# Patient Record
Sex: FEMALE | Race: WHITE | Employment: OTHER | URBAN - METROPOLITAN AREA
[De-identification: names, ages, dates, MRNs, and addresses within clinical notes are randomized per-mention and may not be internally consistent; named-entity substitution may affect disease eponyms.]

---

## 2017-01-03 ENCOUNTER — OFFICE VISIT (OUTPATIENT)
Dept: FAMILY MEDICINE CLINIC | Age: 67
End: 2017-01-03

## 2017-01-03 VITALS
OXYGEN SATURATION: 96 % | HEIGHT: 67 IN | DIASTOLIC BLOOD PRESSURE: 80 MMHG | WEIGHT: 166 LBS | SYSTOLIC BLOOD PRESSURE: 150 MMHG | BODY MASS INDEX: 26.06 KG/M2 | HEART RATE: 72 BPM

## 2017-01-03 DIAGNOSIS — M15.9 PRIMARY OSTEOARTHRITIS INVOLVING MULTIPLE JOINTS: ICD-10-CM

## 2017-01-03 DIAGNOSIS — E78.1 HIGH TRIGLYCERIDES: ICD-10-CM

## 2017-01-03 DIAGNOSIS — E11.9 TYPE 2 DIABETES MELLITUS WITHOUT COMPLICATION, WITHOUT LONG-TERM CURRENT USE OF INSULIN (HCC): ICD-10-CM

## 2017-01-03 DIAGNOSIS — I10 ESSENTIAL HYPERTENSION: Primary | ICD-10-CM

## 2017-01-03 DIAGNOSIS — K21.9 GASTROESOPHAGEAL REFLUX DISEASE WITHOUT ESOPHAGITIS: ICD-10-CM

## 2017-01-03 LAB
A/G RATIO: 1.6 (ref 1.1–2.2)
ALBUMIN SERPL-MCNC: 3.9 G/DL (ref 3.4–5)
ALP BLD-CCNC: 59 U/L (ref 40–129)
ALT SERPL-CCNC: 15 U/L (ref 10–40)
ANION GAP SERPL CALCULATED.3IONS-SCNC: 16 MMOL/L (ref 3–16)
AST SERPL-CCNC: 15 U/L (ref 15–37)
BASOPHILS ABSOLUTE: 0.1 K/UL (ref 0–0.2)
BASOPHILS RELATIVE PERCENT: 0.5 %
BILIRUB SERPL-MCNC: 0.4 MG/DL (ref 0–1)
BUN BLDV-MCNC: 17 MG/DL (ref 7–20)
CALCIUM SERPL-MCNC: 9.3 MG/DL (ref 8.3–10.6)
CHLORIDE BLD-SCNC: 95 MMOL/L (ref 99–110)
CHOLESTEROL, TOTAL: 168 MG/DL (ref 0–199)
CO2: 25 MMOL/L (ref 21–32)
CREAT SERPL-MCNC: <0.5 MG/DL (ref 0.6–1.2)
EOSINOPHILS ABSOLUTE: 0.3 K/UL (ref 0–0.6)
EOSINOPHILS RELATIVE PERCENT: 2.6 %
GFR AFRICAN AMERICAN: >60
GFR NON-AFRICAN AMERICAN: >60
GLOBULIN: 2.5 G/DL
GLUCOSE BLD-MCNC: 143 MG/DL (ref 70–99)
HCT VFR BLD CALC: 42.8 % (ref 36–48)
HDLC SERPL-MCNC: 33 MG/DL (ref 40–60)
HEMOGLOBIN: 14.8 G/DL (ref 12–16)
LDL CHOLESTEROL CALCULATED: 84 MG/DL
LYMPHOCYTES ABSOLUTE: 3.1 K/UL (ref 1–5.1)
LYMPHOCYTES RELATIVE PERCENT: 27.7 %
MCH RBC QN AUTO: 30.9 PG (ref 26–34)
MCHC RBC AUTO-ENTMCNC: 34.6 G/DL (ref 31–36)
MCV RBC AUTO: 89.4 FL (ref 80–100)
MONOCYTES ABSOLUTE: 0.5 K/UL (ref 0–1.3)
MONOCYTES RELATIVE PERCENT: 4.6 %
NEUTROPHILS ABSOLUTE: 7.3 K/UL (ref 1.7–7.7)
NEUTROPHILS RELATIVE PERCENT: 64.6 %
PDW BLD-RTO: 13.5 % (ref 12.4–15.4)
PLATELET # BLD: 165 K/UL (ref 135–450)
PMV BLD AUTO: 10.1 FL (ref 5–10.5)
POTASSIUM SERPL-SCNC: 3.8 MMOL/L (ref 3.5–5.1)
RBC # BLD: 4.79 M/UL (ref 4–5.2)
SODIUM BLD-SCNC: 136 MMOL/L (ref 136–145)
TOTAL PROTEIN: 6.4 G/DL (ref 6.4–8.2)
TRIGL SERPL-MCNC: 256 MG/DL (ref 0–150)
TSH SERPL DL<=0.05 MIU/L-ACNC: 3.18 UIU/ML (ref 0.27–4.2)
VLDLC SERPL CALC-MCNC: 51 MG/DL
WBC # BLD: 11.3 K/UL (ref 4–11)

## 2017-01-03 PROCEDURE — 99214 OFFICE O/P EST MOD 30 MIN: CPT | Performed by: NURSE PRACTITIONER

## 2017-01-03 PROCEDURE — 36415 COLL VENOUS BLD VENIPUNCTURE: CPT | Performed by: NURSE PRACTITIONER

## 2017-01-03 RX ORDER — DILTIAZEM HYDROCHLORIDE 60 MG/1
TABLET, FILM COATED ORAL
Qty: 120 TABLET | Refills: 2 | Status: ON HOLD | OUTPATIENT
Start: 2017-01-03 | End: 2017-01-31 | Stop reason: HOSPADM

## 2017-01-03 RX ORDER — LORAZEPAM 0.5 MG/1
0.5 TABLET ORAL DAILY
Qty: 30 TABLET | Refills: 2 | Status: CANCELLED | OUTPATIENT
Start: 2017-01-03

## 2017-01-03 RX ORDER — LORAZEPAM 0.5 MG/1
0.5 TABLET ORAL NIGHTLY
Qty: 30 TABLET | Refills: 2 | Status: SHIPPED | OUTPATIENT
Start: 2017-01-03

## 2017-01-03 RX ORDER — LISINOPRIL AND HYDROCHLOROTHIAZIDE 25; 20 MG/1; MG/1
1 TABLET ORAL 2 TIMES DAILY
Qty: 60 TABLET | Refills: 2 | Status: SHIPPED | OUTPATIENT
Start: 2017-01-03 | End: 2018-09-10 | Stop reason: SDUPTHER

## 2017-01-03 RX ORDER — GABAPENTIN 300 MG/1
300 CAPSULE ORAL NIGHTLY
Qty: 30 CAPSULE | Refills: 2 | Status: CANCELLED | OUTPATIENT
Start: 2017-01-03

## 2017-01-03 RX ORDER — OMEPRAZOLE 20 MG/1
20 TABLET, DELAYED RELEASE ORAL DAILY
Qty: 30 TABLET | Refills: 2 | Status: SHIPPED | OUTPATIENT
Start: 2017-01-03 | End: 2020-08-28

## 2017-01-03 RX ORDER — ATENOLOL 50 MG/1
TABLET ORAL
Qty: 90 TABLET | Refills: 2 | Status: ON HOLD | OUTPATIENT
Start: 2017-01-03 | End: 2017-01-31 | Stop reason: HOSPADM

## 2017-01-03 ASSESSMENT — ENCOUNTER SYMPTOMS
SINUS PRESSURE: 0
TROUBLE SWALLOWING: 0
SHORTNESS OF BREATH: 0
BACK PAIN: 0
PHOTOPHOBIA: 0
SORE THROAT: 0
WHEEZING: 0
COUGH: 0

## 2017-01-04 LAB
ESTIMATED AVERAGE GLUCOSE: 134.1 MG/DL
HBA1C MFR BLD: 6.3 %

## 2017-01-16 ENCOUNTER — TELEPHONE (OUTPATIENT)
Dept: FAMILY MEDICINE CLINIC | Age: 67
End: 2017-01-16

## 2017-01-27 PROBLEM — I21.4 NSTEMI (NON-ST ELEVATED MYOCARDIAL INFARCTION) (HCC): Status: ACTIVE | Noted: 2017-01-27

## 2017-01-28 PROBLEM — I48.0 PAROXYSMAL ATRIAL FIBRILLATION (HCC): Status: ACTIVE | Noted: 2017-01-28

## 2017-02-09 ENCOUNTER — OFFICE VISIT (OUTPATIENT)
Dept: CARDIOLOGY CLINIC | Age: 67
End: 2017-02-09

## 2017-02-09 VITALS
HEIGHT: 67 IN | OXYGEN SATURATION: 98 % | SYSTOLIC BLOOD PRESSURE: 130 MMHG | BODY MASS INDEX: 25.74 KG/M2 | DIASTOLIC BLOOD PRESSURE: 80 MMHG | WEIGHT: 164 LBS | HEART RATE: 72 BPM

## 2017-02-09 DIAGNOSIS — I48.0 PAROXYSMAL ATRIAL FIBRILLATION (HCC): ICD-10-CM

## 2017-02-09 DIAGNOSIS — I21.4 NSTEMI (NON-ST ELEVATED MYOCARDIAL INFARCTION) (HCC): ICD-10-CM

## 2017-02-09 DIAGNOSIS — I10 ESSENTIAL HYPERTENSION: ICD-10-CM

## 2017-02-09 DIAGNOSIS — I25.10 CORONARY ARTERY DISEASE INVOLVING NATIVE CORONARY ARTERY OF NATIVE HEART WITHOUT ANGINA PECTORIS: Primary | ICD-10-CM

## 2017-02-09 DIAGNOSIS — Z79.899 LONG TERM CURRENT USE OF AMIODARONE: ICD-10-CM

## 2017-02-09 DIAGNOSIS — E78.2 MIXED HYPERLIPIDEMIA: ICD-10-CM

## 2017-02-09 DIAGNOSIS — F17.200 TOBACCO USE DISORDER: ICD-10-CM

## 2017-02-09 DIAGNOSIS — E11.9 TYPE 2 DIABETES MELLITUS WITHOUT COMPLICATION, WITHOUT LONG-TERM CURRENT USE OF INSULIN (HCC): ICD-10-CM

## 2017-02-09 PROCEDURE — 4004F PT TOBACCO SCREEN RCVD TLK: CPT | Performed by: INTERNAL MEDICINE

## 2017-02-09 PROCEDURE — G8400 PT W/DXA NO RESULTS DOC: HCPCS | Performed by: INTERNAL MEDICINE

## 2017-02-09 PROCEDURE — 3044F HG A1C LEVEL LT 7.0%: CPT | Performed by: INTERNAL MEDICINE

## 2017-02-09 PROCEDURE — 1111F DSCHRG MED/CURRENT MED MERGE: CPT | Performed by: INTERNAL MEDICINE

## 2017-02-09 PROCEDURE — 3017F COLORECTAL CA SCREEN DOC REV: CPT | Performed by: INTERNAL MEDICINE

## 2017-02-09 PROCEDURE — 4040F PNEUMOC VAC/ADMIN/RCVD: CPT | Performed by: INTERNAL MEDICINE

## 2017-02-09 PROCEDURE — G8427 DOCREV CUR MEDS BY ELIG CLIN: HCPCS | Performed by: INTERNAL MEDICINE

## 2017-02-09 PROCEDURE — G8598 ASA/ANTIPLAT THER USED: HCPCS | Performed by: INTERNAL MEDICINE

## 2017-02-09 PROCEDURE — 1123F ACP DISCUSS/DSCN MKR DOCD: CPT | Performed by: INTERNAL MEDICINE

## 2017-02-09 PROCEDURE — G8420 CALC BMI NORM PARAMETERS: HCPCS | Performed by: INTERNAL MEDICINE

## 2017-02-09 PROCEDURE — 4000F TOBACCO USE TXMNT COUNSELING: CPT | Performed by: INTERNAL MEDICINE

## 2017-02-09 PROCEDURE — G8484 FLU IMMUNIZE NO ADMIN: HCPCS | Performed by: INTERNAL MEDICINE

## 2017-02-09 PROCEDURE — 3014F SCREEN MAMMO DOC REV: CPT | Performed by: INTERNAL MEDICINE

## 2017-02-09 PROCEDURE — 1090F PRES/ABSN URINE INCON ASSESS: CPT | Performed by: INTERNAL MEDICINE

## 2017-02-09 PROCEDURE — 99215 OFFICE O/P EST HI 40 MIN: CPT | Performed by: INTERNAL MEDICINE

## 2017-02-09 RX ORDER — AMIODARONE HYDROCHLORIDE 200 MG/1
200 TABLET ORAL DAILY
Qty: 60 TABLET | Refills: 5 | Status: SHIPPED | OUTPATIENT
Start: 2017-02-09 | End: 2017-10-17 | Stop reason: DRUGHIGH

## 2017-02-09 RX ORDER — NICOTINE 21 MG/24HR
1 PATCH, TRANSDERMAL 24 HOURS TRANSDERMAL EVERY 24 HOURS
Qty: 14 PATCH | Refills: 0 | Status: SHIPPED | OUTPATIENT
Start: 2017-02-09 | End: 2017-10-17

## 2017-02-09 RX ORDER — ATORVASTATIN CALCIUM 40 MG/1
40 TABLET, FILM COATED ORAL DAILY
Qty: 30 TABLET | Refills: 6 | Status: SHIPPED | OUTPATIENT
Start: 2017-02-09 | End: 2018-09-10 | Stop reason: SDUPTHER

## 2017-02-09 ASSESSMENT — ENCOUNTER SYMPTOMS
BLOOD IN STOOL: 0
WHEEZING: 0
COLOR CHANGE: 0
EYE REDNESS: 0
EYE PAIN: 0
CHEST TIGHTNESS: 0
ABDOMINAL PAIN: 0
COUGH: 0
SHORTNESS OF BREATH: 0

## 2017-02-27 RX ORDER — WARFARIN SODIUM 5 MG/1
5 TABLET ORAL DAILY
Qty: 30 TABLET | Refills: 3 | Status: SHIPPED | OUTPATIENT
Start: 2017-02-27 | End: 2018-09-12 | Stop reason: SDUPTHER

## 2017-05-15 ENCOUNTER — OFFICE VISIT (OUTPATIENT)
Dept: CARDIOLOGY CLINIC | Age: 67
End: 2017-05-15

## 2017-05-15 VITALS
WEIGHT: 173 LBS | DIASTOLIC BLOOD PRESSURE: 80 MMHG | BODY MASS INDEX: 27.8 KG/M2 | OXYGEN SATURATION: 97 % | HEART RATE: 88 BPM | HEIGHT: 66 IN | SYSTOLIC BLOOD PRESSURE: 142 MMHG

## 2017-05-15 DIAGNOSIS — Z79.899 LONG TERM CURRENT USE OF AMIODARONE: ICD-10-CM

## 2017-05-15 DIAGNOSIS — I10 ESSENTIAL HYPERTENSION: ICD-10-CM

## 2017-05-15 DIAGNOSIS — E78.2 MIXED HYPERLIPIDEMIA: ICD-10-CM

## 2017-05-15 DIAGNOSIS — I25.10 CORONARY ARTERY DISEASE INVOLVING NATIVE CORONARY ARTERY OF NATIVE HEART WITHOUT ANGINA PECTORIS: ICD-10-CM

## 2017-05-15 DIAGNOSIS — I48.0 PAROXYSMAL ATRIAL FIBRILLATION (HCC): Primary | ICD-10-CM

## 2017-05-15 PROCEDURE — 3014F SCREEN MAMMO DOC REV: CPT | Performed by: INTERNAL MEDICINE

## 2017-05-15 PROCEDURE — G8598 ASA/ANTIPLAT THER USED: HCPCS | Performed by: INTERNAL MEDICINE

## 2017-05-15 PROCEDURE — 4040F PNEUMOC VAC/ADMIN/RCVD: CPT | Performed by: INTERNAL MEDICINE

## 2017-05-15 PROCEDURE — 4004F PT TOBACCO SCREEN RCVD TLK: CPT | Performed by: INTERNAL MEDICINE

## 2017-05-15 PROCEDURE — G8420 CALC BMI NORM PARAMETERS: HCPCS | Performed by: INTERNAL MEDICINE

## 2017-05-15 PROCEDURE — G8427 DOCREV CUR MEDS BY ELIG CLIN: HCPCS | Performed by: INTERNAL MEDICINE

## 2017-05-15 PROCEDURE — 1123F ACP DISCUSS/DSCN MKR DOCD: CPT | Performed by: INTERNAL MEDICINE

## 2017-05-15 PROCEDURE — 1090F PRES/ABSN URINE INCON ASSESS: CPT | Performed by: INTERNAL MEDICINE

## 2017-05-15 PROCEDURE — 99214 OFFICE O/P EST MOD 30 MIN: CPT | Performed by: INTERNAL MEDICINE

## 2017-05-15 PROCEDURE — 3017F COLORECTAL CA SCREEN DOC REV: CPT | Performed by: INTERNAL MEDICINE

## 2017-05-15 PROCEDURE — G8400 PT W/DXA NO RESULTS DOC: HCPCS | Performed by: INTERNAL MEDICINE

## 2017-05-15 RX ORDER — MAGNESIUM OXIDE 400 MG/1
400 TABLET ORAL 2 TIMES DAILY
Qty: 60 TABLET | Refills: 6 | Status: SHIPPED | OUTPATIENT
Start: 2017-05-15 | End: 2017-12-08 | Stop reason: SDUPTHER

## 2017-05-15 RX ORDER — METOPROLOL TARTRATE 50 MG/1
50 TABLET, FILM COATED ORAL 2 TIMES DAILY
Qty: 60 TABLET | Refills: 6 | Status: SHIPPED | OUTPATIENT
Start: 2017-05-15 | End: 2017-12-08 | Stop reason: SDUPTHER

## 2017-05-15 ASSESSMENT — ENCOUNTER SYMPTOMS
CHEST TIGHTNESS: 0
ABDOMINAL PAIN: 0
COUGH: 0
COLOR CHANGE: 0
WHEEZING: 0
EYE REDNESS: 0
SHORTNESS OF BREATH: 0
BLOOD IN STOOL: 0
EYE PAIN: 0

## 2017-10-17 ENCOUNTER — OFFICE VISIT (OUTPATIENT)
Dept: CARDIOLOGY CLINIC | Age: 67
End: 2017-10-17

## 2017-10-17 VITALS
SYSTOLIC BLOOD PRESSURE: 180 MMHG | OXYGEN SATURATION: 96 % | HEART RATE: 80 BPM | BODY MASS INDEX: 30.51 KG/M2 | WEIGHT: 189 LBS | DIASTOLIC BLOOD PRESSURE: 90 MMHG

## 2017-10-17 DIAGNOSIS — R07.9 CHEST PAIN, UNSPECIFIED TYPE: ICD-10-CM

## 2017-10-17 DIAGNOSIS — I25.10 CORONARY ARTERY DISEASE INVOLVING NATIVE CORONARY ARTERY OF NATIVE HEART WITHOUT ANGINA PECTORIS: ICD-10-CM

## 2017-10-17 DIAGNOSIS — I10 ESSENTIAL HYPERTENSION: ICD-10-CM

## 2017-10-17 DIAGNOSIS — I48.0 PAROXYSMAL ATRIAL FIBRILLATION (HCC): Primary | ICD-10-CM

## 2017-10-17 DIAGNOSIS — E78.2 MIXED HYPERLIPIDEMIA: ICD-10-CM

## 2017-10-17 DIAGNOSIS — Z79.899 LONG TERM CURRENT USE OF AMIODARONE: ICD-10-CM

## 2017-10-17 DIAGNOSIS — E83.42 HYPOMAGNESEMIA: ICD-10-CM

## 2017-10-17 PROCEDURE — G8484 FLU IMMUNIZE NO ADMIN: HCPCS | Performed by: INTERNAL MEDICINE

## 2017-10-17 PROCEDURE — 3014F SCREEN MAMMO DOC REV: CPT | Performed by: INTERNAL MEDICINE

## 2017-10-17 PROCEDURE — 1090F PRES/ABSN URINE INCON ASSESS: CPT | Performed by: INTERNAL MEDICINE

## 2017-10-17 PROCEDURE — 4040F PNEUMOC VAC/ADMIN/RCVD: CPT | Performed by: INTERNAL MEDICINE

## 2017-10-17 PROCEDURE — G8598 ASA/ANTIPLAT THER USED: HCPCS | Performed by: INTERNAL MEDICINE

## 2017-10-17 PROCEDURE — G8417 CALC BMI ABV UP PARAM F/U: HCPCS | Performed by: INTERNAL MEDICINE

## 2017-10-17 PROCEDURE — 99214 OFFICE O/P EST MOD 30 MIN: CPT | Performed by: INTERNAL MEDICINE

## 2017-10-17 PROCEDURE — G8400 PT W/DXA NO RESULTS DOC: HCPCS | Performed by: INTERNAL MEDICINE

## 2017-10-17 PROCEDURE — G8427 DOCREV CUR MEDS BY ELIG CLIN: HCPCS | Performed by: INTERNAL MEDICINE

## 2017-10-17 PROCEDURE — 93000 ELECTROCARDIOGRAM COMPLETE: CPT | Performed by: INTERNAL MEDICINE

## 2017-10-17 PROCEDURE — 1123F ACP DISCUSS/DSCN MKR DOCD: CPT | Performed by: INTERNAL MEDICINE

## 2017-10-17 PROCEDURE — 4004F PT TOBACCO SCREEN RCVD TLK: CPT | Performed by: INTERNAL MEDICINE

## 2017-10-17 PROCEDURE — 3017F COLORECTAL CA SCREEN DOC REV: CPT | Performed by: INTERNAL MEDICINE

## 2017-10-17 RX ORDER — AMLODIPINE BESYLATE 10 MG/1
10 TABLET ORAL DAILY
Qty: 30 TABLET | Refills: 6 | Status: SHIPPED | OUTPATIENT
Start: 2017-10-17 | End: 2017-10-30 | Stop reason: CLARIF

## 2017-10-17 RX ORDER — AMIODARONE HYDROCHLORIDE 200 MG/1
100 TABLET ORAL DAILY
Qty: 30 TABLET | Refills: 6 | Status: SHIPPED
Start: 2017-10-17 | End: 2018-04-24 | Stop reason: SDUPTHER

## 2017-10-17 ASSESSMENT — ENCOUNTER SYMPTOMS
ABDOMINAL PAIN: 0
SHORTNESS OF BREATH: 0
CHEST TIGHTNESS: 0
EYE REDNESS: 0
EYE PAIN: 0
COLOR CHANGE: 0
BLOOD IN STOOL: 0
WHEEZING: 0
COUGH: 0

## 2017-10-17 NOTE — PATIENT INSTRUCTIONS
Patient Education        High Blood Pressure: Care Instructions  Your Care Instructions  If your blood pressure is usually above 140/90, you have high blood pressure, or hypertension. That means the top number is 140 or higher or the bottom number is 90 or higher, or both. Despite what a lot of people think, high blood pressure usually doesn't cause headaches or make you feel dizzy or lightheaded. It usually has no symptoms. But it does increase your risk for heart attack, stroke, and kidney or eye damage. The higher your blood pressure, the more your risk increases. Your doctor will give you a goal for your blood pressure. Your goal will be based on your health and your age. An example of a goal is to keep your blood pressure below 140/90. Lifestyle changes, such as eating healthy and being active, are always important to help lower blood pressure. You might also take medicine to reach your blood pressure goal.  Follow-up care is a key part of your treatment and safety. Be sure to make and go to all appointments, and call your doctor if you are having problems. It's also a good idea to know your test results and keep a list of the medicines you take. How can you care for yourself at home? Medical treatment  · If you stop taking your medicine, your blood pressure will go back up. You may take one or more types of medicine to lower your blood pressure. Be safe with medicines. Take your medicine exactly as prescribed. Call your doctor if you think you are having a problem with your medicine. · Talk to your doctor before you start taking aspirin every day. Aspirin can help certain people lower their risk of a heart attack or stroke. But taking aspirin isn't right for everyone, because it can cause serious bleeding. · See your doctor regularly. You may need to see the doctor more often at first or until your blood pressure comes down.   · If you are taking blood pressure medicine, talk to your doctor before you take decongestants or anti-inflammatory medicine, such as ibuprofen. Some of these medicines can raise blood pressure. · Learn how to check your blood pressure at home. Lifestyle changes  · Stay at a healthy weight. This is especially important if you put on weight around the waist. Losing even 10 pounds can help you lower your blood pressure. · If your doctor recommends it, get more exercise. Walking is a good choice. Bit by bit, increase the amount you walk every day. Try for at least 30 minutes on most days of the week. You also may want to swim, bike, or do other activities. · Avoid or limit alcohol. Talk to your doctor about whether you can drink any alcohol. · Try to limit how much sodium you eat to less than 2,300 milligrams (mg) a day. Your doctor may ask you to try to eat less than 1,500 mg a day. · Eat plenty of fruits (such as bananas and oranges), vegetables, legumes, whole grains, and low-fat dairy products. · Lower the amount of saturated fat in your diet. Saturated fat is found in animal products such as milk, cheese, and meat. Limiting these foods may help you lose weight and also lower your risk for heart disease. · Do not smoke. Smoking increases your risk for heart attack and stroke. If you need help quitting, talk to your doctor about stop-smoking programs and medicines. These can increase your chances of quitting for good. When should you call for help? Call 911 anytime you think you may need emergency care. This may mean having symptoms that suggest that your blood pressure is causing a serious heart or blood vessel problem. Your blood pressure may be over 180/110. For example, call 911 if:  · You have symptoms of a heart attack. These may include:  ¨ Chest pain or pressure, or a strange feeling in the chest.  ¨ Sweating. ¨ Shortness of breath. ¨ Nausea or vomiting.   ¨ Pain, pressure, or a strange feeling in the back, neck, jaw, or upper belly or in one or both shoulders or

## 2017-10-17 NOTE — LETTER
[address] Cardiology - [address] [facility] [address]  [address]   [address] [address]  Phone: [phone]  Fax: [phone]    Emely Lomeli MD        November 6, 2017     [facility]  No address on file    Patient: Xiomy Ruiz  MR Number: [id number]  YOB: 1950  Date of Visit: 10/17/2017    Dear Dr. Yandy Ledesma:    [facility] Xiomy Ruiz reports several episodes of chest pain the past few months. She states that pain is mild and described as a tightness in her mid-sternal area. Her pain occurs at rest. She was seen in the ED 10/10/17 for UTI. Today she denies exertional palpitations, dizziness, syncope, leg swelling and increased dyspnea. Assessment:       Atrial fibrillation ([address] Taking amiodarone and Eliquis. Mg 1.2, K normal TSH normal. Taking Mg supplements.  CAD (coronary artery disease)     NSTEMI 1/201-->Echo LVEF normal, grade II diastolic dysfx, LAE. Cath 1/27/17 70-75% LAD, FFR LAD 0.80, 50% PLD- Med tx.  GERD (gastroesophageal reflux disease)    Dyslipidemia. LDL 42. Taking statin.  DM, managed by PCP.  Hypertension-uncontrolled.  Tobacco use disorder. Cessation discussed. Chest pain. Atypical. EKG 10/2017 NSR, T wave inversions V3-V6 consider ischemia- unchanged. .       Plan:      Atypical chest pain, no angina. Currently in regular rhythm. Will reduce amiodarone to 100mg daily. In view of HTN, will stop diltiazem and start norvasc 10mg daily. Continue to monitor BP. Fasting lipid profile, CMP, TSH with reflex, Mg level prior to next visit. If you have questions, please do not hesitate to call me. I look forward to following Jennifer Catherine along with you.     Sincerely,        Emely Lomeli MD

## 2017-10-17 NOTE — PROGRESS NOTES
Subjective:      Patient ID: Rojas Estrada is a 75 y.o. female. Reason for visit: f/u CAD  CC: \"I had chest pain. \"    HPI Rojas Estrada reports several episodes of chest pain the past few months. She states that pain is mild and described as a tightness in her mid-sternal area. Her pain occurs at rest. She was seen in the ED 10/10/17 for UTI. Today she denies exertional palpitations, dizziness, syncope, leg swelling and increased dyspnea. Review of Systems   Constitutional: Negative for activity change, appetite change, diaphoresis and fatigue. HENT: Negative for nosebleeds and tinnitus. Eyes: Negative for pain and redness. Respiratory: Negative for cough, chest tightness, shortness of breath and wheezing. Cardiovascular: Positive for chest pain. Negative for palpitations and leg swelling. Gastrointestinal: Negative for abdominal pain and blood in stool. Genitourinary: Negative for difficulty urinating, hematuria, pelvic pain and vaginal bleeding. Musculoskeletal: Negative for joint swelling, myalgias and neck pain. Skin: Negative for color change and pallor. Neurological: Negative for dizziness, syncope, numbness and headaches. Hematological: Does not bruise/bleed easily. Psychiatric/Behavioral: Negative for confusion and decreased concentration. All other systems reviewed and are negative. Vitals:    10/17/17 1506 10/17/17 1509   BP: (!) 200/90 (!) 180/90   Site: Left Arm Left Arm   Position: Sitting Sitting   Cuff Size: Medium Adult Medium Adult   Pulse: 80    SpO2: 96%    Weight: 189 lb (85.7 kg)      Body mass index is 30.51 kg/m².      Wt Readings from Last 3 Encounters:   10/17/17 189 lb (85.7 kg)   10/10/17 185 lb (83.9 kg)   05/15/17 173 lb (78.5 kg)     BP Readings from Last 3 Encounters:   10/17/17 (!) 180/90   10/10/17 (!) 193/99   05/15/17 (!) 142/80      Current Outpatient Prescriptions on File Prior to Visit   Medication Sig Dispense Refill    cefUROXime sounds are normal.   Musculoskeletal: Normal range of motion. She exhibits no edema. Neurological: She is alert and oriented to person, place, and time. Skin: Skin is warm and dry. Psychiatric: She has a normal mood and affect. Her behavior is normal.   Nursing note and vitals reviewed. Recent labs and imaging reviewed. Assessment:       Atrial fibrillation ([address] Taking amiodarone and Eliquis. Mg 1.2, K normal TSH normal. Taking Mg supplements.  CAD (coronary artery disease)     NSTEMI 1/201-->Echo LVEF normal, grade II diastolic dysfx, LAE. Cath 1/27/17 70-75% LAD, FFR LAD 0.80, 50% PLD- Med tx.  GERD (gastroesophageal reflux disease)    Dyslipidemia. LDL 42. Taking statin.  DM, managed by PCP.  Hypertension-uncontrolled.  Tobacco use disorder. Cessation discussed. Chest pain. Atypical. EKG 10/2017 NSR, T wave inversions V3-V6 consider ischemia- unchanged. .       Plan:      Atypical chest pain, no angina. Currently in regular rhythm. Will reduce amiodarone to 100mg daily. In view of HTN, will stop diltiazem and start norvasc 10mg daily. Continue to monitor BP. Fasting lipid profile, CMP, TSH with reflex, Mg level prior to next visit.

## 2017-10-30 ENCOUNTER — TELEPHONE (OUTPATIENT)
Dept: CARDIOLOGY CLINIC | Age: 67
End: 2017-10-30

## 2017-10-30 RX ORDER — DILTIAZEM HYDROCHLORIDE 240 MG/1
240 CAPSULE, EXTENDED RELEASE ORAL DAILY
COMMUNITY
End: 2017-10-30 | Stop reason: SDUPTHER

## 2017-10-30 NOTE — TELEPHONE ENCOUNTER
Please advise pt to add clonidine 0.1mg po bid for high BP. Continue other medications at this time.

## 2017-10-31 RX ORDER — DILTIAZEM HYDROCHLORIDE 240 MG/1
240 CAPSULE, EXTENDED RELEASE ORAL DAILY
Qty: 30 CAPSULE | Refills: 5 | Status: SHIPPED | OUTPATIENT
Start: 2017-10-31 | End: 2018-09-04 | Stop reason: ALTCHOICE

## 2017-11-06 NOTE — COMMUNICATION BODY
HPI Lilia Win reports several episodes of chest pain the past few months. She states that pain is mild and described as a tightness in her mid-sternal area. Her pain occurs at rest. She was seen in the ED 10/10/17 for UTI. Today she denies exertional palpitations, dizziness, syncope, leg swelling and increased dyspnea. Assessment:       Atrial fibrillation ([address] Taking amiodarone and Eliquis. Mg 1.2, K normal TSH normal. Taking Mg supplements.  CAD (coronary artery disease)     NSTEMI 1/201-->Echo LVEF normal, grade II diastolic dysfx, LAE. Cath 1/27/17 70-75% LAD, FFR LAD 0.80, 50% PLD- Med tx.  GERD (gastroesophageal reflux disease)    Dyslipidemia. LDL 42. Taking statin.  DM, managed by PCP.  Hypertension-uncontrolled.  Tobacco use disorder. Cessation discussed. Chest pain. Atypical. EKG 10/2017 NSR, T wave inversions V3-V6 consider ischemia- unchanged. .       Plan:      Atypical chest pain, no angina. Currently in regular rhythm. Will reduce amiodarone to 100mg daily. In view of HTN, will stop diltiazem and start norvasc 10mg daily. Continue to monitor BP. Fasting lipid profile, CMP, TSH with reflex, Mg level prior to next visit.

## 2017-12-08 RX ORDER — METOPROLOL TARTRATE 50 MG/1
50 TABLET, FILM COATED ORAL 2 TIMES DAILY
Qty: 60 TABLET | Refills: 6 | Status: ON HOLD | OUTPATIENT
Start: 2017-12-08 | End: 2018-09-07

## 2017-12-08 RX ORDER — MAGNESIUM OXIDE 400 MG/1
400 TABLET ORAL 2 TIMES DAILY
Qty: 60 TABLET | Refills: 6 | Status: SHIPPED | OUTPATIENT
Start: 2017-12-08 | End: 2018-08-06 | Stop reason: SDUPTHER

## 2018-04-20 PROBLEM — Z79.899 LONG TERM CURRENT USE OF AMIODARONE: Status: ACTIVE | Noted: 2018-04-20

## 2018-04-20 ASSESSMENT — ENCOUNTER SYMPTOMS
ABDOMINAL PAIN: 0
EYE REDNESS: 0
EYE PAIN: 0
CHEST TIGHTNESS: 0
WHEEZING: 0
COUGH: 0
BLOOD IN STOOL: 0
COLOR CHANGE: 0

## 2018-04-23 ENCOUNTER — OFFICE VISIT (OUTPATIENT)
Dept: CARDIOLOGY CLINIC | Age: 68
End: 2018-04-23

## 2018-04-23 VITALS
BODY MASS INDEX: 30.86 KG/M2 | WEIGHT: 192 LBS | HEART RATE: 82 BPM | OXYGEN SATURATION: 96 % | HEIGHT: 66 IN | SYSTOLIC BLOOD PRESSURE: 158 MMHG | DIASTOLIC BLOOD PRESSURE: 88 MMHG

## 2018-04-23 DIAGNOSIS — I48.0 PAROXYSMAL ATRIAL FIBRILLATION (HCC): ICD-10-CM

## 2018-04-23 DIAGNOSIS — I10 ESSENTIAL HYPERTENSION: ICD-10-CM

## 2018-04-23 DIAGNOSIS — Z79.899 LONG TERM CURRENT USE OF AMIODARONE: ICD-10-CM

## 2018-04-23 DIAGNOSIS — I25.10 CORONARY ARTERY DISEASE INVOLVING NATIVE CORONARY ARTERY OF NATIVE HEART WITHOUT ANGINA PECTORIS: Primary | ICD-10-CM

## 2018-04-23 DIAGNOSIS — E78.2 MIXED HYPERLIPIDEMIA: ICD-10-CM

## 2018-04-23 PROCEDURE — G8599 NO ASA/ANTIPLAT THER USE RNG: HCPCS | Performed by: INTERNAL MEDICINE

## 2018-04-23 PROCEDURE — G8427 DOCREV CUR MEDS BY ELIG CLIN: HCPCS | Performed by: INTERNAL MEDICINE

## 2018-04-23 PROCEDURE — 99214 OFFICE O/P EST MOD 30 MIN: CPT | Performed by: INTERNAL MEDICINE

## 2018-04-23 PROCEDURE — 1123F ACP DISCUSS/DSCN MKR DOCD: CPT | Performed by: INTERNAL MEDICINE

## 2018-04-23 PROCEDURE — 4040F PNEUMOC VAC/ADMIN/RCVD: CPT | Performed by: INTERNAL MEDICINE

## 2018-04-23 PROCEDURE — G8417 CALC BMI ABV UP PARAM F/U: HCPCS | Performed by: INTERNAL MEDICINE

## 2018-04-23 PROCEDURE — 1036F TOBACCO NON-USER: CPT | Performed by: INTERNAL MEDICINE

## 2018-04-23 PROCEDURE — 3017F COLORECTAL CA SCREEN DOC REV: CPT | Performed by: INTERNAL MEDICINE

## 2018-04-23 PROCEDURE — G8400 PT W/DXA NO RESULTS DOC: HCPCS | Performed by: INTERNAL MEDICINE

## 2018-04-23 PROCEDURE — 1090F PRES/ABSN URINE INCON ASSESS: CPT | Performed by: INTERNAL MEDICINE

## 2018-04-23 RX ORDER — ISOSORBIDE MONONITRATE 30 MG/1
30 TABLET, EXTENDED RELEASE ORAL DAILY
Qty: 90 TABLET | Refills: 3 | Status: SHIPPED | OUTPATIENT
Start: 2018-04-23 | End: 2018-09-10 | Stop reason: SDUPTHER

## 2018-04-23 ASSESSMENT — ENCOUNTER SYMPTOMS: SHORTNESS OF BREATH: 1

## 2018-04-24 RX ORDER — AMIODARONE HYDROCHLORIDE 200 MG/1
100 TABLET ORAL DAILY
Qty: 30 TABLET | Refills: 6 | Status: SHIPPED | OUTPATIENT
Start: 2018-04-24 | End: 2018-09-10 | Stop reason: SDUPTHER

## 2018-09-04 PROBLEM — I48.91 A-FIB (HCC): Status: ACTIVE | Noted: 2018-09-04

## 2018-09-10 DIAGNOSIS — I10 ESSENTIAL HYPERTENSION: ICD-10-CM

## 2018-09-10 RX ORDER — LISINOPRIL AND HYDROCHLOROTHIAZIDE 25; 20 MG/1; MG/1
1 TABLET ORAL 2 TIMES DAILY
Qty: 180 TABLET | Refills: 3 | Status: SHIPPED | OUTPATIENT
Start: 2018-09-10 | End: 2019-05-09 | Stop reason: SDUPTHER

## 2018-09-10 RX ORDER — ISOSORBIDE MONONITRATE 30 MG/1
30 TABLET, EXTENDED RELEASE ORAL DAILY
Qty: 90 TABLET | Refills: 3 | Status: SHIPPED | OUTPATIENT
Start: 2018-09-10 | End: 2019-05-09 | Stop reason: SDUPTHER

## 2018-09-10 RX ORDER — METOPROLOL TARTRATE 50 MG/1
100 TABLET, FILM COATED ORAL 2 TIMES DAILY
Qty: 360 TABLET | Refills: 3 | Status: SHIPPED | OUTPATIENT
Start: 2018-09-10 | End: 2019-05-09 | Stop reason: SDUPTHER

## 2018-09-10 RX ORDER — ATORVASTATIN CALCIUM 40 MG/1
40 TABLET, FILM COATED ORAL DAILY
Qty: 90 TABLET | Refills: 3 | Status: SHIPPED | OUTPATIENT
Start: 2018-09-10 | End: 2019-05-09 | Stop reason: SDUPTHER

## 2018-09-10 RX ORDER — AMIODARONE HYDROCHLORIDE 200 MG/1
100 TABLET ORAL DAILY
Qty: 45 TABLET | Refills: 3 | Status: SHIPPED | OUTPATIENT
Start: 2018-09-10 | End: 2018-10-01 | Stop reason: ALTCHOICE

## 2018-09-10 RX ORDER — DILTIAZEM HYDROCHLORIDE 240 MG/1
240 CAPSULE, COATED, EXTENDED RELEASE ORAL DAILY
Qty: 90 CAPSULE | Refills: 3 | Status: SHIPPED | OUTPATIENT
Start: 2018-09-10 | End: 2018-11-05 | Stop reason: SDUPTHER

## 2018-09-10 NOTE — TELEPHONE ENCOUNTER
Patient was recently discharged from [facility], [facility]. She states she needs all of her medications sent in to [facility] in [facility].   *(Patient has already received lovenox, but will need metoprolol tartrate resent)

## 2018-09-13 RX ORDER — WARFARIN SODIUM 5 MG/1
5 TABLET ORAL DAILY
Qty: 30 TABLET | Refills: 3 | Status: SHIPPED | OUTPATIENT
Start: 2018-09-13 | End: 2019-05-29 | Stop reason: SDUPTHER

## 2018-09-27 NOTE — PROGRESS NOTES
persisting atrial fibrillation or atrial flutter) which provides an effective immediate therapy with success rates of 75% or higher, but it provides no short nor long term efficacy. Anti-arrhythmic medications provide a very effective short term therapy, but even with our most potent anti-arrhythmic medication there is limited long term efficacy (clinical studies have shown that 40% of patients remain atrial fibrillation-free after 4 years of follow-up after starting one of the more powerful anti-arrhythmic medication (amiodarone), and, if extrapolated, may have further diminishing success as time goes on). Atrial fibrillation ablation is a potentially curative therapy with very reasonable success rate after a first time procedure and with improving success rates with subsequent procedures. The risks, benefits and alternatives of the ablation procedure were discussed with the patient. The risks including, but not limited to, the risks of bleeding, infection, radiation exposure, injury to vascular, cardiac and surrounding structures (including pneumothorax), stroke, cardiac perforation, tamponade, need for emergent open heart surgery, need for pacemaker implantation, injury to the phrenic nerve, injury to the esophagus, myocardial infarction and death were discussed in detail. The patient opted to proceed with the ablation. -prior to the ablation, recommend pursing a BRITTANY/ DCCV initially (we will not hold coumadin prior to the cardioversion) followed by an EPS/ RFA. Post DCCV, will plan to stop the cardizem and start Flecainide 50mg BID, along with pre-admission Metoprolol.    -Will schedule for RF ablation with [employer] system. We will hold the Flecainide 7 days prior to the ablation. Cardiac CTA prior to procedure for pulmonary vein mapping. Continue with coumadin therapy. We will not hold coumadin prior to the ablation. Will order BMP, CBC, PT/INR, and Type & Screen prior to the procedure.

## 2018-10-01 ENCOUNTER — OFFICE VISIT (OUTPATIENT)
Dept: CARDIOLOGY CLINIC | Age: 68
End: 2018-10-01
Payer: MEDICARE

## 2018-10-01 VITALS
BODY MASS INDEX: 31.02 KG/M2 | DIASTOLIC BLOOD PRESSURE: 82 MMHG | OXYGEN SATURATION: 96 % | SYSTOLIC BLOOD PRESSURE: 136 MMHG | WEIGHT: 193 LBS | HEIGHT: 66 IN | HEART RATE: 107 BPM

## 2018-10-01 DIAGNOSIS — R53.82 CHRONIC FATIGUE: ICD-10-CM

## 2018-10-01 DIAGNOSIS — I48.0 PAROXYSMAL A-FIB (HCC): Primary | ICD-10-CM

## 2018-10-01 DIAGNOSIS — I48.0 PAROXYSMAL A-FIB (HCC): ICD-10-CM

## 2018-10-01 LAB
ANION GAP SERPL CALCULATED.3IONS-SCNC: 15 MMOL/L (ref 3–16)
BUN BLDV-MCNC: 21 MG/DL (ref 7–20)
CALCIUM SERPL-MCNC: 9.7 MG/DL (ref 8.3–10.6)
CHLORIDE BLD-SCNC: 95 MMOL/L (ref 99–110)
CO2: 27 MMOL/L (ref 21–32)
CREAT SERPL-MCNC: 0.6 MG/DL (ref 0.6–1.2)
GFR AFRICAN AMERICAN: >60
GFR NON-AFRICAN AMERICAN: >60
GLUCOSE BLD-MCNC: 223 MG/DL (ref 70–99)
HCT VFR BLD CALC: 38.9 % (ref 36–48)
HEMOGLOBIN: 13.2 G/DL (ref 12–16)
INR BLD: 2.16 (ref 0.86–1.14)
MCH RBC QN AUTO: 29.8 PG (ref 26–34)
MCHC RBC AUTO-ENTMCNC: 34.1 G/DL (ref 31–36)
MCV RBC AUTO: 87.4 FL (ref 80–100)
PDW BLD-RTO: 14.8 % (ref 12.4–15.4)
PLATELET # BLD: 244 K/UL (ref 135–450)
PMV BLD AUTO: 9.5 FL (ref 5–10.5)
POTASSIUM SERPL-SCNC: 3.8 MMOL/L (ref 3.5–5.1)
PROTHROMBIN TIME: 24.6 SEC (ref 9.8–13)
RBC # BLD: 4.45 M/UL (ref 4–5.2)
SODIUM BLD-SCNC: 137 MMOL/L (ref 136–145)
WBC # BLD: 9.6 K/UL (ref 4–11)

## 2018-10-01 PROCEDURE — 93000 ELECTROCARDIOGRAM COMPLETE: CPT | Performed by: INTERNAL MEDICINE

## 2018-10-01 PROCEDURE — G8400 PT W/DXA NO RESULTS DOC: HCPCS | Performed by: INTERNAL MEDICINE

## 2018-10-01 PROCEDURE — 1036F TOBACCO NON-USER: CPT | Performed by: INTERNAL MEDICINE

## 2018-10-01 PROCEDURE — 1101F PT FALLS ASSESS-DOCD LE1/YR: CPT | Performed by: INTERNAL MEDICINE

## 2018-10-01 PROCEDURE — 4040F PNEUMOC VAC/ADMIN/RCVD: CPT | Performed by: INTERNAL MEDICINE

## 2018-10-01 PROCEDURE — G8417 CALC BMI ABV UP PARAM F/U: HCPCS | Performed by: INTERNAL MEDICINE

## 2018-10-01 PROCEDURE — G8484 FLU IMMUNIZE NO ADMIN: HCPCS | Performed by: INTERNAL MEDICINE

## 2018-10-01 PROCEDURE — G8427 DOCREV CUR MEDS BY ELIG CLIN: HCPCS | Performed by: INTERNAL MEDICINE

## 2018-10-01 PROCEDURE — 3017F COLORECTAL CA SCREEN DOC REV: CPT | Performed by: INTERNAL MEDICINE

## 2018-10-01 PROCEDURE — 1111F DSCHRG MED/CURRENT MED MERGE: CPT | Performed by: INTERNAL MEDICINE

## 2018-10-01 PROCEDURE — 1123F ACP DISCUSS/DSCN MKR DOCD: CPT | Performed by: INTERNAL MEDICINE

## 2018-10-01 PROCEDURE — 99215 OFFICE O/P EST HI 40 MIN: CPT | Performed by: INTERNAL MEDICINE

## 2018-10-01 PROCEDURE — G8598 ASA/ANTIPLAT THER USED: HCPCS | Performed by: INTERNAL MEDICINE

## 2018-10-01 PROCEDURE — 1090F PRES/ABSN URINE INCON ASSESS: CPT | Performed by: INTERNAL MEDICINE

## 2018-10-01 NOTE — LETTER
[address]   Electrophysiology Consultation   Date: 9/27/2018  Reason for Consultation: Afib  Consult Requesting Physician: Sarath Deluna MD  Primary Care Physician: Salvatore Moon MD    Chief Complaint:   Chief Complaint   Patient presents with   [address] from [address] [place] [address] 9/4-9/7. AFIB w/ RVR- discuss treatment        HPI: Sam Matos is a 70 y.o. with a PMH significant for NSTEMI (2017), HLD, hypothyroidism, HTN, DM and PAF (on amiodarone and coumadin) first diagnosed in 2017 in the setting of acute coronary syndrome who arrived to [facility] 9/4/18 with complaints of palpitations and fatigue. She was found to be back in AFib RVR and her beta blocker therapy was increased for tighter rate control. She was scheduled for a DCCV but spontaneously converted on her own prior to procedure. She was discharged on BB, amiodarone, diltiazem and coumadin with instructions to follow up outpatient for additional treatment options. She is typically followed by Dr. Willy Carrera for her cardiac needs. Today, she reports that she continues to deal with \"a lot of fatigue. \" She is \"very exhausted\" and \"just wants to function. \"  Per EKG, she remains in Afib '107 bpm. She is compliant with her medications and tolerating. Denies bleeding issues with the coumadin therapy. She denies lightheadedness, dizziness, chest pain, orthopnea, edema, presyncope or syncope. Past Medical History:   Diagnosis Date    Atrial fibrillation ([address]     CAD (coronary artery disease)     NSTEMI 1/201-->Echo LVEF normal, grade II diastolic dysfx, LAE. Cath 1/27/17 70-75% LAD, FFR LAD 0.80, 50% PLD- Med tx.      GERD (gastroesophageal reflux disease)     High triglycerides     HLD (hyperlipidemia)     Hypertension     Menopause     Osteoarthritis     Stress incontinence     Thyroid disease     Type II or unspecified type diabetes mellitus without mention of complication, not stated as uncontrolled Past Surgical History:   Procedure Laterality Date    APPENDECTOMY  1953    CHOLECYSTECTOMY  9/2006    COLONOSCOPY      FACIAL COSMETIC SURGERY  1965    TONSILLECTOMY  1952    TUBAL LIGATION         Allergies: Allergies   Allergen Reactions    Influenza Vaccines Hives       Medication:   Prior to Admission medications    Medication Sig Start Date End Date Taking? Authorizing Provider   warfarin (COUMADIN) 5 MG tablet Take 1 tablet by mouth daily Or as directed by the coumadin clinic. 9/13/18  Yes Mitzi Dubon MD   metoprolol tartrate (LOPRESSOR) 50 MG tablet Take 2 tablets by mouth 2 times daily 9/10/18  Yes Mitzi Dubon MD   lisinopril-hydrochlorothiazide (PRINZIDE;ZESTORETIC) 20-25 MG per tablet Take 1 tablet by mouth 2 times daily 9/10/18  Yes Mitzi Dubon MD   isosorbide mononitrate (IMDUR) 30 MG extended release tablet Take 1 tablet by mouth daily 9/10/18  Yes Mitzi Dubon MD   atorvastatin (LIPITOR) 40 MG tablet Take 1 tablet by mouth daily 9/10/18  Yes Prfelice Dubon MD   diltiazem (DILTIAZEM CD) 240 MG extended release capsule Take 1 capsule by mouth daily 9/10/18  Yes Mitzi Dubon MD   warfarin (COUMADIN) 2.5 MG tablet Take 2.5 mg by mouth Tues, Wed, Thurs, Sat and Sun.    Yes Historical Provider, MD   magnesium oxide (MAG-OX) 400 (241.3 Mg) MG TABS tablet TAKE ONE TABLET BY MOUTH TWICE A DAY 8/6/18  Yes Prfelice Dubon MD   Multiple Vitamins-Minerals (THERAPEUTIC MULTIVITAMIN-MINERALS) tablet Take 1 tablet by mouth daily   Yes Historical Provider, MD [place] Oil 300 MG CAPS Take 350 mg by mouth 2 times daily   Yes Historical Provider, MD   metFORMIN (GLUCOPHAGE) 850 MG tablet Take 1 tablet by mouth 3 times daily 1/3/17  Yes OSWALD Rutherford CNP   omeprazole (PRILOSEC OTC) 20 MG tablet Take 1 tablet by mouth daily 1/3/17  Yes OSWALD Rutherford CNP   LORazepam (ATIVAN) 0.5 MG tablet Take 1 tablet by mouth nightly will not hold coumadin prior to the ablation. Will order BMP, CBC, PT/INR, and Type & Screen prior to the procedure. Thank you for allowing me to participate in the care of Son Marie. All questions and concerns were addressed to the patient/family. Alternatives to my treatment were discussed. This note was scribed in the presence of Dr. Jami Beckman MD by Luly Gentile RN. The scribe's documentation has been prepared under my direction and personally reviewed by me in its entirety. I confirm that the note above accurately reflects all work, physical examination, the discussion of treatments and procedures, and medical decision making performed by me.       Shaye Lockwood MD, MS, [address] [facility]  Cardiac Electrophysiology  The [address] [place] [address]  [phone]

## 2018-10-02 ENCOUNTER — TELEPHONE (OUTPATIENT)
Dept: CARDIOLOGY CLINIC | Age: 68
End: 2018-10-02

## 2018-10-04 ENCOUNTER — HOSPITAL ENCOUNTER (OUTPATIENT)
Dept: CARDIAC CATH/INVASIVE PROCEDURES | Age: 68
Discharge: HOME | End: 2018-10-04
Payer: MEDICARE

## 2018-10-04 PROCEDURE — 92960 CARDIOVERSION ELECTRIC EXT: CPT | Performed by: INTERNAL MEDICINE

## 2018-10-04 PROCEDURE — 99152 MOD SED SAME PHYS/QHP 5/>YRS: CPT | Performed by: INTERNAL MEDICINE

## 2018-10-04 PROCEDURE — 92960 CARDIOVERSION ELECTRIC EXT: CPT | Performed by: FAMILY MEDICINE

## 2018-10-04 PROCEDURE — 2500000003 HC RX 250 WO HCPCS

## 2018-10-04 PROCEDURE — 85610 PROTHROMBIN TIME: CPT

## 2018-10-04 PROCEDURE — 93312 ECHO TRANSESOPHAGEAL: CPT | Performed by: FAMILY MEDICINE

## 2018-10-04 RX ORDER — FLECAINIDE ACETATE 50 MG/1
50 TABLET ORAL EVERY 12 HOURS SCHEDULED
Qty: 60 TABLET | Refills: 3 | Status: SHIPPED | OUTPATIENT
Start: 2018-10-04 | End: 2019-04-04 | Stop reason: SDUPTHER

## 2018-10-04 RX ORDER — SODIUM CHLORIDE 0.9 % (FLUSH) 0.9 %
10 SYRINGE (ML) INJECTION EVERY 12 HOURS SCHEDULED
Status: DISCONTINUED | OUTPATIENT
Start: 2018-10-04 | End: 2018-10-05 | Stop reason: HOSPADM

## 2018-10-04 RX ORDER — FLECAINIDE ACETATE 100 MG/1
50 TABLET ORAL EVERY 12 HOURS SCHEDULED
Status: DISCONTINUED | OUTPATIENT
Start: 2018-10-04 | End: 2018-10-05 | Stop reason: HOSPADM

## 2018-10-04 RX ORDER — SODIUM CHLORIDE 9 MG/ML
INJECTION, SOLUTION INTRAVENOUS CONTINUOUS
Status: DISCONTINUED | OUTPATIENT
Start: 2018-10-04 | End: 2018-10-05 | Stop reason: HOSPADM

## 2018-10-04 RX ORDER — SODIUM CHLORIDE 0.9 % (FLUSH) 0.9 %
10 SYRINGE (ML) INJECTION PRN
Status: DISCONTINUED | OUTPATIENT
Start: 2018-10-04 | End: 2018-10-05 | Stop reason: HOSPADM

## 2018-10-04 NOTE — PROCEDURES
[address]     Electrophysiology Procedure Note       Date of Procedure: 10/4/2018  Patient's Name: Dulce Maria Pritchett  YOB: 1950   Medical Record Number: [id number]  Referring Physician: Shira att. providers found  Procedure Performed by: Linh Arias MD    Procedures performed:  IV sedation with Versed, Fentanyl, and Brevital   Trans-esophageal echocardiography  External Electrical cardioversion     Indication of the procedure: Persistent atrial fibrillation      Details of procedure: The patient was brought to the cath lab area in a fasting and non-sedated state. The risks, benefits and alternatives of the procedure were discussed with the patient. The patient opted to proceed with the procedure. Written informed consent was signed and placed in the chart. A timeout protocol was completed to identify the patient and the procedure being performed. IV sedation was provided with IV Versed, Fentanyl initially and BRITTANY was performed which did not show any TIMOTHY/LA clot/thrombus. Full BRITTANY reports will be dictated. Patient is on chronic anticoagulation therapy. Then we used Brevital for sedation, and electrical DC cardioversion was performed using 200J, synchronized shock. Patient was converted to sinus rhythm. The patient tolerated the procedure well and there were no complications. Conclusion:   Successful external DC cardioversion of persistent atrial fibrillation. Plan:   The patient can be discharged if remains stable. Will continue with medical therapy. We will start Flecainide 50mg BID and pre-admission Metoprolol 50mg BID, along with coumadin to maintain INR 2-3. The patient will follow-up with Dr. Hine Naylor as an outpatient to confirm contnued sinus rhythm. She is already planned for an atrial fibrillation ablation in the near future. Thank you for allowing me to participate in the care of this patient. If you have any questions, please feel free to contact me.     Linh Arias MD, MS, [facility], [facility]  Cardiac Electrophysiology  [address] [facility]   Office: [phone]

## 2018-10-04 NOTE — H&P
COLONOSCOPY      FACIAL COSMETIC SURGERY  1965    TONSILLECTOMY  1952    TUBAL LIGATION         Allergies: Allergies   Allergen Reactions    Influenza Vaccines Hives       Medication:   Prior to Admission medications    Medication Sig Start Date End Date Taking? Authorizing Provider   warfarin (COUMADIN) 5 MG tablet Take 1 tablet by mouth daily Or as directed by the coumadin clinic. 9/13/18   Darreld Hamman, MD   metoprolol tartrate (LOPRESSOR) 50 MG tablet Take 2 tablets by mouth 2 times daily 9/10/18   Darreld Hamman, MD   lisinopril-hydrochlorothiazide (PRINZIDE;ZESTORETIC) 20-25 MG per tablet Take 1 tablet by mouth 2 times daily 9/10/18   Darreld Hamman, MD   isosorbide mononitrate (IMDUR) 30 MG extended release tablet Take 1 tablet by mouth daily 9/10/18   Darreld Hamman, MD   atorvastatin (LIPITOR) 40 MG tablet Take 1 tablet by mouth daily 9/10/18   Darreld Hamman, MD   diltiazem (DILTIAZEM CD) 240 MG extended release capsule Take 1 capsule by mouth daily 9/10/18   Darreld Hamman, MD   warfarin (COUMADIN) 2.5 MG tablet Take 2.5 mg by mouth Tues, Wed, Thurs, Sat and Sun. Historical Provider, MD   magnesium oxide (MAG-OX) 400 (241.3 Mg) MG TABS tablet TAKE ONE TABLET BY MOUTH TWICE A DAY 8/6/18   Darreld Hamman, MD   Multiple Vitamins-Minerals (THERAPEUTIC MULTIVITAMIN-MINERALS) tablet Take 1 tablet by mouth daily    Historical Provider, MD [place] Oil 300 MG CAPS Take 350 mg by mouth 2 times daily    Historical Provider, MD   metFORMIN (GLUCOPHAGE) 850 MG tablet Take 1 tablet by mouth 3 times daily 1/3/17   OSWALD Randle CNP   omeprazole (PRILOSEC OTC) 20 MG tablet Take 1 tablet by mouth daily 1/3/17   OSWALD Randle CNP   LORazepam (ATIVAN) 0.5 MG tablet Take 1 tablet by mouth nightly  Patient taking differently: Take 0.5 mg by mouth 2 times daily as needed.  . 1/3/17   OSWALD Randle CNP   calcium-vitamin D (OSCAL-500) 500-200 MG-UNIT per tablet Take 2 tablets by mouth daily    Historical Provider, MD       Social History:   reports that she quit smoking about 3 weeks ago. Her smoking use included Cigarettes. She has a 22.50 pack-year smoking history. She has never used smokeless tobacco. She reports that she does not drink alcohol or use drugs. Family History:  family history includes Cancer in her father and mother; Coronary Art Dis in her father; Diabetes in her mother; High Blood Pressure in her mother; Other in her maternal grandmother. Reviewed. Denies family history of sudden cardiac death, arrhythmia, premature CAD    Review of System:    · General ROS: negative for - chills, fever +fatigue  · Psychological ROS: negative for - anxiety or depression  · Ophthalmic ROS: negative for - eye pain or loss of vision  · ENT ROS: negative for - epistaxis, headaches, nasal discharge, sore throat   · Allergy and Immunology ROS: negative for - hives, nasal congestion   · Hematological and Lymphatic ROS: negative for - bleeding problems, blood clots, bruising or jaundice  · Endocrine ROS: negative for - skin changes, temperature intolerance or unexpected weight changes  · Respiratory ROS: negative for - cough, hemoptysis, pleuritic pain, SOB, sputum changes or wheezing  · Cardiovascular ROS: Per HPI. · Gastrointestinal ROS: negative for - abdominal pain, blood in stools, diarrhea, hematemesis, melena, nausea/vomiting or  swallowing difficulty/pain  · Genito-Urinary ROS: negative for - dysuria or incontinence  · Musculoskeletal ROS: negative for - joint swelling or muscle pain  · Neurological ROS: negative for - confusion, dizziness, gait disturbance, headaches, numbness/tingling, seizures, speech  problems, tremors, visual changes or weakness  · Dermatological ROS: negative for - rash    Physical Examination:  There were no vitals filed for this visit. · Constitutional: Oriented. No distress. · Head: Normocephalic and atraumatic.    · Mouth/Throat: Oropharynx is clear and moist.   · Eyes: Conjunctivae normal. EOM are normal.   · Neck: Normal range of motion. Neck supple. No rigidity. No JVD present. · Cardiovascular: Rapid rate, irregular rhythm, S1&S2 and intact distal pulses. · Pulmonary/Chest: Bilateral respiratory sounds. No wheezes. No rhonchi. · Abdominal: Soft. Bowel sounds present. No distension, No tenderness. · Musculoskeletal: No tenderness. No edema    · Lymphadenopathy: Has no cervical adenopathy. · Neurological: Alert and oriented. Cranial nerve appears intact, No Gross deficit   · Skin: Skin is warm and dry. No rash noted. · Psychiatric: Has a normal mood, affect and behavior     Labs:  Reviewed. Cr 0.6, K 3.7 (4/2018- [facility])    ECG: reviewed, 10/1/18 Afib '107    Other Non-Invasive Studies:     1. Event monitor:   None    2. Echo: 1/27/17 [facility])  EF 55-60%  Grade II DD  Left atrial cavity is severely dilated    3. Stress Test:    None    4. Cath: 2/1/17  ANGIOGRAPHIC FINDINGS:  1. The left main coronary artery comes from the left coronary cusp with  normal AYDIN 3 flow. 2.  The left anterior descending artery comes from the left main coronary  artery. The mid portion has approximately 70% stenosis with a fractional flow  reserve of 0.80. 3. The left circumflex comes from the left main coronary. It is small in  caliber and has mild diffuse disease and AYDIN 3 flow. 4.  The right coronary artery comes from the right coronary cusp. It is a  dominant vessel, giving rise to the posterior descending artery and  posterolateral branch. Mid portion has 50% stenosis. 5.  LV ejection fraction of 60%, LVEDP of 14 mmHg.     In summary, moderate left anterior descending artery stenosis of 70% to 75%  severity with a fractional flow reserve of 0.80. Procedures:  1.  None    Assessment:   Patient Active Problem List    Diagnosis Date Noted    Atrial fibrillation with RVR ([facility] [address] 09/04/2018     Priority: Low    Long term current use of hold coumadin prior to the ablation. Will order BMP, CBC, PT/INR, and Type & Screen prior to the procedure. Thank you for allowing me to participate in the care of Carlos Meléndez. All questions and concerns were addressed to the patient/family. Alternatives to my treatment were discussed. This note was scribed in the presence of Dr. Donna Silver MD by Taz Larry RN. The scribe's documentation has been prepared under my direction and personally reviewed by me in its entirety. I confirm that the note above accurately reflects all work, physical examination, the discussion of treatments and procedures, and medical decision making performed by me. I have reviewed the history and physical and examined the patient and find no relevant changes. I have reviewed with the patient and/or family the risks, benefits, and alternatives to the procedure.       Hilton Ojeda MD, MS, [facility]  Cardiac Electrophysiology  The [address] [place] [address]  [phone]

## 2018-10-04 NOTE — PRE SEDATION
Sedation Pre-Procedure Note    Patient Name: Amelia Bhardwaj   YOB: 1950  Room/Bed: Room/bed info not found  Medical Record Number: [id number]  Date: 10/4/2018   Time: 10:22 AM       Indication:  Persistent atrial fibrillation, symptomatic    Consent: I have discussed with the patient and/or the patient representative the indication, alternatives, and the possible risks and/or complications of the planned procedure and the anesthesia methods. The patient and/or patient representative appear to understand and agree to proceed. Vital Signs: There were no vitals filed for this visit. Past Medical History:   has a past medical history of Atrial fibrillation ([facility] [address] CAD (coronary artery disease); GERD (gastroesophageal reflux disease); High triglycerides; HLD (hyperlipidemia); Hypertension; Menopause; Osteoarthritis; Stress incontinence; Thyroid disease; and Type II or unspecified type diabetes mellitus without mention of complication, not stated as uncontrolled. Past Surgical History:   has a past surgical history that includes Tonsillectomy (1952); Appendectomy (1953); Cholecystectomy (9/2006); Facial cosmetic surgery (1965); Colonoscopy; and Tubal ligation. Medications:   Scheduled Meds:    sodium chloride flush  10 mL Intravenous 2 times per day     Continuous Infusions:    sodium chloride       PRN Meds: sodium chloride flush  Home Meds:   Prior to Admission medications    Medication Sig Start Date End Date Taking? Authorizing Provider   warfarin (COUMADIN) 5 MG tablet Take 1 tablet by mouth daily Or as directed by the coumadin clinic.  9/13/18   Juan Finley MD   metoprolol tartrate (LOPRESSOR) 50 MG tablet Take 2 tablets by mouth 2 times daily 9/10/18   Juan Finley MD   lisinopril-hydrochlorothiazide (PRINZIDE;ZESTORETIC) 20-25 MG per tablet Take 1 tablet by mouth 2 times daily 9/10/18   Juan Finley MD   isosorbide mononitrate (IMDUR) 30 MG extended release tablet Take 1

## 2018-10-05 LAB — INR BLD: 2.5 (ref 0.85–1.15)

## 2018-10-15 ENCOUNTER — OFFICE VISIT (OUTPATIENT)
Dept: CARDIOLOGY CLINIC | Age: 68
End: 2018-10-15
Payer: MEDICARE

## 2018-10-15 ENCOUNTER — HOSPITAL ENCOUNTER (OUTPATIENT)
Dept: NON INVASIVE DIAGNOSTICS | Age: 68
Discharge: HOME | End: 2018-10-15
Payer: MEDICARE

## 2018-10-15 VITALS
HEART RATE: 120 BPM | BODY MASS INDEX: 31.5 KG/M2 | DIASTOLIC BLOOD PRESSURE: 80 MMHG | SYSTOLIC BLOOD PRESSURE: 122 MMHG | WEIGHT: 196 LBS | HEIGHT: 66 IN | OXYGEN SATURATION: 97 %

## 2018-10-15 DIAGNOSIS — I48.0 PAROXYSMAL ATRIAL FIBRILLATION (HCC): Primary | ICD-10-CM

## 2018-10-15 DIAGNOSIS — I48.3 TYPICAL ATRIAL FLUTTER (HCC): ICD-10-CM

## 2018-10-15 DIAGNOSIS — I48.0 PAROXYSMAL A-FIB (HCC): ICD-10-CM

## 2018-10-15 LAB
LV EF: 53 %
LVEF MODALITY: NORMAL

## 2018-10-15 PROCEDURE — 1036F TOBACCO NON-USER: CPT | Performed by: INTERNAL MEDICINE

## 2018-10-15 PROCEDURE — G8484 FLU IMMUNIZE NO ADMIN: HCPCS | Performed by: INTERNAL MEDICINE

## 2018-10-15 PROCEDURE — 1123F ACP DISCUSS/DSCN MKR DOCD: CPT | Performed by: INTERNAL MEDICINE

## 2018-10-15 PROCEDURE — 93000 ELECTROCARDIOGRAM COMPLETE: CPT | Performed by: INTERNAL MEDICINE

## 2018-10-15 PROCEDURE — 99215 OFFICE O/P EST HI 40 MIN: CPT | Performed by: INTERNAL MEDICINE

## 2018-10-15 PROCEDURE — G8417 CALC BMI ABV UP PARAM F/U: HCPCS | Performed by: INTERNAL MEDICINE

## 2018-10-15 PROCEDURE — G8598 ASA/ANTIPLAT THER USED: HCPCS | Performed by: INTERNAL MEDICINE

## 2018-10-15 PROCEDURE — 3017F COLORECTAL CA SCREEN DOC REV: CPT | Performed by: INTERNAL MEDICINE

## 2018-10-15 PROCEDURE — 93306 TTE W/DOPPLER COMPLETE: CPT

## 2018-10-15 PROCEDURE — G8427 DOCREV CUR MEDS BY ELIG CLIN: HCPCS | Performed by: INTERNAL MEDICINE

## 2018-10-15 PROCEDURE — 1090F PRES/ABSN URINE INCON ASSESS: CPT | Performed by: INTERNAL MEDICINE

## 2018-10-15 PROCEDURE — 4040F PNEUMOC VAC/ADMIN/RCVD: CPT | Performed by: INTERNAL MEDICINE

## 2018-10-15 PROCEDURE — G8400 PT W/DXA NO RESULTS DOC: HCPCS | Performed by: INTERNAL MEDICINE

## 2018-10-15 PROCEDURE — 1101F PT FALLS ASSESS-DOCD LE1/YR: CPT | Performed by: INTERNAL MEDICINE

## 2018-10-15 RX ORDER — DIGOXIN 125 MCG
125 TABLET ORAL DAILY
Qty: 30 TABLET | Refills: 3 | Status: ON HOLD | OUTPATIENT
Start: 2018-10-15 | End: 2019-03-20 | Stop reason: HOSPADM

## 2018-10-15 NOTE — LETTER
LORazepam (ATIVAN) 0.5 MG tablet Take 1 tablet by mouth nightly  Patient taking differently: Take 0.5 mg by mouth 2 times daily as needed. . 1/3/17  Yes OSWALD Jefferson CNP   calcium-vitamin D (OSCAL-500) 500-200 MG-UNIT per tablet Take 2 tablets by mouth daily   Yes Historical Provider, MD       Social History:   reports that she quit smoking about 5 weeks ago. Her smoking use included Cigarettes. She has a 22.50 pack-year smoking history. She has never used smokeless tobacco. She reports that she does not drink alcohol or use drugs. Family History:  family history includes Cancer in her father and mother; Coronary Art Dis in her father; Diabetes in her mother; High Blood Pressure in her mother; Other in her maternal grandmother. Reviewed. Denies family history of sudden cardiac death, arrhythmia, premature CAD    Review of System:    · General ROS: negative for - chills, fever +fatigue  · Psychological ROS: negative for - anxiety or depression  · Ophthalmic ROS: negative for - eye pain or loss of vision  · ENT ROS: negative for - epistaxis, headaches, nasal discharge, sore throat   · Allergy and Immunology ROS: negative for - hives, nasal congestion   · Hematological and Lymphatic ROS: negative for - bleeding problems, blood clots, bruising or jaundice  · Endocrine ROS: negative for - skin changes, temperature intolerance or unexpected weight changes  · Respiratory ROS: negative for - cough, hemoptysis, pleuritic pain, SOB, sputum changes or wheezing  · Cardiovascular ROS: Per HPI.    · Gastrointestinal ROS: negative for - abdominal pain, blood in stools, diarrhea, hematemesis, melena, nausea/vomiting or  swallowing difficulty/pain  · Genito-Urinary ROS: negative for - dysuria or incontinence  · Musculoskeletal ROS: negative for - joint swelling or muscle pain  · Neurological ROS: negative for - confusion, dizziness, gait disturbance,

## 2018-10-15 NOTE — PROGRESS NOTES
[address]   Electrophysiology Consultation   Date: 10/15/2018  Reason for Consultation: Afib  Consult Requesting Physician: Tong Ray MD  Primary Care Physician: Yokasta Randolph MD    Chief Complaint:   Chief Complaint   Patient presents with    Follow Up After Procedure     S/P DCCV 10/4. PAF,HTN,HLD. DM. PT c/o fatigued. HPI: Amy Dong is a 70 y.o. with a PMH significant for NSTEMI (2017), HLD, hypothyroidism, HTN, DM and PAF (on amiodarone and coumadin) first diagnosed in 2017 in the setting of acute coronary syndrome who arrived to [facility] 9/4/18 with complaints of palpitations and fatigue. She was found to be back in AFib RVR and her beta blocker therapy was increased for tighter rate control. She was scheduled for a DCCV but spontaneously converted on her own prior to procedure. She was discharged on BB, amiodarone, diltiazem and coumadin with instructions to follow up outpatient for additional treatment options. She is typically followed by Dr. Julián Pryor for her cardiac needs. Today, she reports that she continues to deal with \"a lot of fatigue. \" She is \"very exhausted\" and \"just wants to function. \"  Per EKG, she remains in Afib '107 bpm. She is compliant with her medications and tolerating. Denies bleeding issues with the coumadin therapy. She denies lightheadedness, dizziness, chest pain, orthopnea, edema, presyncope or syncope. Past Medical History:   Diagnosis Date    Atrial fibrillation ([address]     CAD (coronary artery disease)     NSTEMI 1/201-->Echo LVEF normal, grade II diastolic dysfx, LAE. Cath 1/27/17 70-75% LAD, FFR LAD 0.80, 50% PLD- Med tx.      GERD (gastroesophageal reflux disease)     High triglycerides     HLD (hyperlipidemia)     Hypertension     Menopause     Osteoarthritis     Stress incontinence     Thyroid disease     Type II or unspecified type diabetes mellitus without mention of complication, not stated as uncontrolled         Past (ATIVAN) 0.5 MG tablet Take 1 tablet by mouth nightly  Patient taking differently: Take 0.5 mg by mouth 2 times daily as needed. . 1/3/17  Yes OSWALD Sandoval CNP   calcium-vitamin D (OSCAL-500) 500-200 MG-UNIT per tablet Take 2 tablets by mouth daily   Yes Historical Provider, MD       Social History:   reports that she quit smoking about 5 weeks ago. Her smoking use included Cigarettes. She has a 22.50 pack-year smoking history. She has never used smokeless tobacco. She reports that she does not drink alcohol or use drugs. Family History:  family history includes Cancer in her father and mother; Coronary Art Dis in her father; Diabetes in her mother; High Blood Pressure in her mother; Other in her maternal grandmother. Reviewed. Denies family history of sudden cardiac death, arrhythmia, premature CAD    Review of System:    · General ROS: negative for - chills, fever +fatigue  · Psychological ROS: negative for - anxiety or depression  · Ophthalmic ROS: negative for - eye pain or loss of vision  · ENT ROS: negative for - epistaxis, headaches, nasal discharge, sore throat   · Allergy and Immunology ROS: negative for - hives, nasal congestion   · Hematological and Lymphatic ROS: negative for - bleeding problems, blood clots, bruising or jaundice  · Endocrine ROS: negative for - skin changes, temperature intolerance or unexpected weight changes  · Respiratory ROS: negative for - cough, hemoptysis, pleuritic pain, SOB, sputum changes or wheezing  · Cardiovascular ROS: Per HPI.    · Gastrointestinal ROS: negative for - abdominal pain, blood in stools, diarrhea, hematemesis, melena, nausea/vomiting or  swallowing difficulty/pain  · Genito-Urinary ROS: negative for - dysuria or incontinence  · Musculoskeletal ROS: negative for - joint swelling or muscle pain  · Neurological ROS: negative for - confusion, dizziness, gait disturbance, headaches, numbness/tingling, seizures, speech  problems, tremors, remain atrial fibrillation-free after 4 years of follow-up after starting one of the more powerful anti-arrhythmic medication (amiodarone), and, if extrapolated, may have further diminishing success as time goes on). Atrial fibrillation ablation is a potentially curative therapy with very reasonable success rate after a first time procedure and with improving success rates with subsequent procedures. The risks, benefits and alternatives of the ablation procedure were discussed with the patient. The risks including, but not limited to, the risks of bleeding, infection, radiation exposure, injury to vascular, cardiac and surrounding structures (including pneumothorax), stroke, cardiac perforation, tamponade, need for emergent open heart surgery, need for pacemaker implantation, injury to the phrenic nerve, injury to the esophagus, myocardial infarction and death were discussed in detail. The patient opted to proceed with the ablation.     -Will schedule for RF ablation with Carto Navigation system. We will hold the Flecainide 7 days prior to the ablation. Cardiac CTA prior to procedure for pulmonary vein mapping. Continue with coumadin therapy. We will not hold coumadin prior to the ablation. Will order BMP, CBC, PT/INR, and Type & Screen prior to the procedure. Thank you for allowing me to participate in the care of Rosemarie Begum. All questions and concerns were addressed to the patient/family. Alternatives to my treatment were discussed. This note was scribed in the presence of Dr. Robert Hutchins MD by Ginette Sandoval RN. The scribe's documentation has been prepared under my direction and personally reviewed by me in its entirety. I confirm that the note above accurately reflects all work, physical examination, the discussion of treatments and procedures, and medical decision making performed by me.       Josselyn Crawford MD, MS, Alicia Luke, [facility]  Cardiac Electrophysiology  The [address] [place] [address]

## 2018-10-16 ENCOUNTER — TELEPHONE (OUTPATIENT)
Dept: CARDIOLOGY CLINIC | Age: 68
End: 2018-10-16

## 2018-10-16 NOTE — TELEPHONE ENCOUNTER
Procedure scheduled  10/30/18  12;30pm         Pt to arrive & report to [facility] cath lab  11:00am  Pre procedure labs due (10/19-10/29)    NPO Midnight  Ok to take all med's in am with sip of water. diabetic med's are to be held on the day of the procedure.  at discharge  If you go home the same day as your procedure someone needs to stay with you overnight.      HFU ov  3 month fu ov/ recall    Patient expressed understanding

## 2018-10-29 ENCOUNTER — ANESTHESIA EVENT (OUTPATIENT)
Dept: CARDIAC CATH/INVASIVE PROCEDURES | Age: 68
End: 2018-10-29

## 2018-10-30 ENCOUNTER — HOSPITAL ENCOUNTER (OUTPATIENT)
Dept: CARDIAC CATH/INVASIVE PROCEDURES | Age: 68
LOS: 1 days | Discharge: HOME | End: 2018-10-30
Attending: INTERNAL MEDICINE | Admitting: INTERNAL MEDICINE
Payer: MEDICARE

## 2018-10-30 ENCOUNTER — ANESTHESIA (OUTPATIENT)
Dept: CARDIAC CATH/INVASIVE PROCEDURES | Age: 68
End: 2018-10-30

## 2018-10-30 VITALS
DIASTOLIC BLOOD PRESSURE: 55 MMHG | RESPIRATION RATE: 1 BRPM | TEMPERATURE: 97.3 F | SYSTOLIC BLOOD PRESSURE: 111 MMHG | OXYGEN SATURATION: 90 %

## 2018-10-30 VITALS
OXYGEN SATURATION: 92 % | RESPIRATION RATE: 17 BRPM | TEMPERATURE: 97.8 F | HEART RATE: 81 BPM | BODY MASS INDEX: 31.53 KG/M2 | HEIGHT: 66 IN | WEIGHT: 196.21 LBS | DIASTOLIC BLOOD PRESSURE: 86 MMHG | SYSTOLIC BLOOD PRESSURE: 116 MMHG

## 2018-10-30 PROBLEM — Z86.79 S/P ABLATION OF ATRIAL FLUTTER: Status: ACTIVE | Noted: 2018-10-30

## 2018-10-30 PROBLEM — I48.92 RIGHT ATRIAL FLUTTER BY ELECTROCARDIOGRAM (HCC): Status: ACTIVE | Noted: 2018-10-30

## 2018-10-30 PROBLEM — Z98.890 S/P ABLATION OF ATRIAL FLUTTER: Status: ACTIVE | Noted: 2018-10-30

## 2018-10-30 LAB
INR BLD: 1.67 (ref 0.86–1.14)
PROTHROMBIN TIME: 19 SEC (ref 9.8–13)

## 2018-10-30 PROCEDURE — 6360000002 HC RX W HCPCS: Performed by: NURSE ANESTHETIST, CERTIFIED REGISTERED

## 2018-10-30 PROCEDURE — 93653 COMPRE EP EVAL TX SVT: CPT | Performed by: FAMILY MEDICINE

## 2018-10-30 PROCEDURE — 2500000003 HC RX 250 WO HCPCS

## 2018-10-30 PROCEDURE — 93653 COMPRE EP EVAL TX SVT: CPT | Performed by: INTERNAL MEDICINE

## 2018-10-30 PROCEDURE — 2580000003 HC RX 258: Performed by: NURSE ANESTHETIST, CERTIFIED REGISTERED

## 2018-10-30 PROCEDURE — 2580000003 HC RX 258: Performed by: ANESTHESIOLOGY

## 2018-10-30 PROCEDURE — C1730 CATH, EP, 19 OR FEW ELECT: HCPCS

## 2018-10-30 PROCEDURE — 6360000002 HC RX W HCPCS: Performed by: ANESTHESIOLOGY

## 2018-10-30 PROCEDURE — 6360000002 HC RX W HCPCS

## 2018-10-30 PROCEDURE — 2580000003 HC RX 258

## 2018-10-30 PROCEDURE — C1894 INTRO/SHEATH, NON-LASER: HCPCS

## 2018-10-30 PROCEDURE — 6370000000 HC RX 637 (ALT 250 FOR IP): Performed by: INTERNAL MEDICINE

## 2018-10-30 PROCEDURE — 2500000003 HC RX 250 WO HCPCS: Performed by: NURSE ANESTHETIST, CERTIFIED REGISTERED

## 2018-10-30 PROCEDURE — 3700000000 HC ANESTHESIA ATTENDED CARE

## 2018-10-30 PROCEDURE — 6360000002 HC RX W HCPCS: Performed by: INTERNAL MEDICINE

## 2018-10-30 PROCEDURE — 7100000000 HC PACU RECOVERY - FIRST 15 MIN

## 2018-10-30 PROCEDURE — 93621 COMP EP EVL L PAC&REC C SINS: CPT | Performed by: INTERNAL MEDICINE

## 2018-10-30 PROCEDURE — 93613 INTRACARDIAC EPHYS 3D MAPG: CPT | Performed by: FAMILY MEDICINE

## 2018-10-30 PROCEDURE — 99217 PR OBSERVATION CARE DISCHARGE MANAGEMENT: CPT | Performed by: INTERNAL MEDICINE

## 2018-10-30 PROCEDURE — 93005 ELECTROCARDIOGRAM TRACING: CPT | Performed by: INTERNAL MEDICINE

## 2018-10-30 PROCEDURE — 85610 PROTHROMBIN TIME: CPT

## 2018-10-30 PROCEDURE — C1732 CATH, EP, DIAG/ABL, 3D/VECT: HCPCS

## 2018-10-30 PROCEDURE — 3700000001 HC ADD 15 MINUTES (ANESTHESIA)

## 2018-10-30 PROCEDURE — 93613 INTRACARDIAC EPHYS 3D MAPG: CPT | Performed by: INTERNAL MEDICINE

## 2018-10-30 PROCEDURE — 7100000001 HC PACU RECOVERY - ADDTL 15 MIN

## 2018-10-30 RX ORDER — LIDOCAINE HYDROCHLORIDE 20 MG/ML
INJECTION, SOLUTION INFILTRATION; PERINEURAL PRN
Status: DISCONTINUED | OUTPATIENT
Start: 2018-10-30 | End: 2018-10-30 | Stop reason: SDUPTHER

## 2018-10-30 RX ORDER — FENTANYL CITRATE 50 UG/ML
50 INJECTION, SOLUTION INTRAMUSCULAR; INTRAVENOUS EVERY 5 MIN PRN
Status: DISCONTINUED | OUTPATIENT
Start: 2018-10-30 | End: 2018-10-30 | Stop reason: HOSPADM

## 2018-10-30 RX ORDER — ATORVASTATIN CALCIUM 40 MG/1
40 TABLET, FILM COATED ORAL DAILY
Status: DISCONTINUED | OUTPATIENT
Start: 2018-10-30 | End: 2018-10-30 | Stop reason: HOSPADM

## 2018-10-30 RX ORDER — DILTIAZEM HYDROCHLORIDE 240 MG/1
240 CAPSULE, COATED, EXTENDED RELEASE ORAL DAILY
Status: DISCONTINUED | OUTPATIENT
Start: 2018-10-31 | End: 2018-10-30 | Stop reason: HOSPADM

## 2018-10-30 RX ORDER — FENTANYL CITRATE 50 UG/ML
INJECTION, SOLUTION INTRAMUSCULAR; INTRAVENOUS PRN
Status: DISCONTINUED | OUTPATIENT
Start: 2018-10-30 | End: 2018-10-30 | Stop reason: SDUPTHER

## 2018-10-30 RX ORDER — HEPARIN SODIUM 5000 [USP'U]/ML
INJECTION, SOLUTION INTRAVENOUS; SUBCUTANEOUS PRN
Status: DISCONTINUED | OUTPATIENT
Start: 2018-10-30 | End: 2018-10-30 | Stop reason: SDUPTHER

## 2018-10-30 RX ORDER — ISOSORBIDE MONONITRATE 30 MG/1
30 TABLET, EXTENDED RELEASE ORAL DAILY
Status: DISCONTINUED | OUTPATIENT
Start: 2018-10-30 | End: 2018-10-30 | Stop reason: HOSPADM

## 2018-10-30 RX ORDER — SODIUM CHLORIDE 9 MG/ML
INJECTION, SOLUTION INTRAVENOUS CONTINUOUS
Status: DISCONTINUED | OUTPATIENT
Start: 2018-10-30 | End: 2018-10-30 | Stop reason: HOSPADM

## 2018-10-30 RX ORDER — SODIUM CHLORIDE 0.9 % (FLUSH) 0.9 %
10 SYRINGE (ML) INJECTION EVERY 12 HOURS SCHEDULED
Status: DISCONTINUED | OUTPATIENT
Start: 2018-10-30 | End: 2018-10-30 | Stop reason: SDUPTHER

## 2018-10-30 RX ORDER — FENTANYL CITRATE 50 UG/ML
25 INJECTION, SOLUTION INTRAMUSCULAR; INTRAVENOUS EVERY 5 MIN PRN
Status: DISCONTINUED | OUTPATIENT
Start: 2018-10-30 | End: 2018-10-30 | Stop reason: HOSPADM

## 2018-10-30 RX ORDER — WARFARIN SODIUM 5 MG/1
2.5 TABLET ORAL DAILY
Status: DISCONTINUED | OUTPATIENT
Start: 2018-10-30 | End: 2018-10-30

## 2018-10-30 RX ORDER — ACETAMINOPHEN 325 MG/1
650 TABLET ORAL EVERY 4 HOURS PRN
Status: DISCONTINUED | OUTPATIENT
Start: 2018-10-30 | End: 2018-10-30 | Stop reason: HOSPADM

## 2018-10-30 RX ORDER — ONDANSETRON 2 MG/ML
4 INJECTION INTRAMUSCULAR; INTRAVENOUS
Status: DISCONTINUED | OUTPATIENT
Start: 2018-10-30 | End: 2018-10-30 | Stop reason: HOSPADM

## 2018-10-30 RX ORDER — DEXAMETHASONE SODIUM PHOSPHATE 4 MG/ML
INJECTION, SOLUTION INTRA-ARTICULAR; INTRALESIONAL; INTRAMUSCULAR; INTRAVENOUS; SOFT TISSUE PRN
Status: DISCONTINUED | OUTPATIENT
Start: 2018-10-30 | End: 2018-10-30 | Stop reason: SDUPTHER

## 2018-10-30 RX ORDER — LISINOPRIL AND HYDROCHLOROTHIAZIDE 12.5; 1 MG/1; MG/1
2 TABLET ORAL 2 TIMES DAILY
Status: DISCONTINUED | OUTPATIENT
Start: 2018-10-30 | End: 2018-10-30 | Stop reason: HOSPADM

## 2018-10-30 RX ORDER — LORAZEPAM 0.5 MG/1
0.5 TABLET ORAL NIGHTLY
Status: DISCONTINUED | OUTPATIENT
Start: 2018-10-30 | End: 2018-10-30 | Stop reason: HOSPADM

## 2018-10-30 RX ORDER — GLYCOPYRROLATE 0.2 MG/ML
INJECTION INTRAMUSCULAR; INTRAVENOUS PRN
Status: DISCONTINUED | OUTPATIENT
Start: 2018-10-30 | End: 2018-10-30 | Stop reason: SDUPTHER

## 2018-10-30 RX ORDER — SODIUM CHLORIDE 0.9 % (FLUSH) 0.9 %
10 SYRINGE (ML) INJECTION PRN
Status: DISCONTINUED | OUTPATIENT
Start: 2018-10-30 | End: 2018-10-30 | Stop reason: SDUPTHER

## 2018-10-30 RX ORDER — FLECAINIDE ACETATE 100 MG/1
50 TABLET ORAL EVERY 12 HOURS SCHEDULED
Status: DISCONTINUED | OUTPATIENT
Start: 2018-10-30 | End: 2018-10-30 | Stop reason: HOSPADM

## 2018-10-30 RX ORDER — ROCURONIUM BROMIDE 10 MG/ML
INJECTION, SOLUTION INTRAVENOUS PRN
Status: DISCONTINUED | OUTPATIENT
Start: 2018-10-30 | End: 2018-10-30 | Stop reason: SDUPTHER

## 2018-10-30 RX ORDER — SODIUM CHLORIDE 9 MG/ML
INJECTION, SOLUTION INTRAVENOUS CONTINUOUS
Status: DISCONTINUED | OUTPATIENT
Start: 2018-10-30 | End: 2018-10-30 | Stop reason: SDUPTHER

## 2018-10-30 RX ORDER — WARFARIN SODIUM 5 MG/1
5 TABLET ORAL
Status: COMPLETED | OUTPATIENT
Start: 2018-10-30 | End: 2018-10-30

## 2018-10-30 RX ORDER — DIGOXIN 125 MCG
125 TABLET ORAL DAILY
Status: DISCONTINUED | OUTPATIENT
Start: 2018-10-31 | End: 2018-10-30 | Stop reason: HOSPADM

## 2018-10-30 RX ORDER — SUCCINYLCHOLINE CHLORIDE 20 MG/ML
INJECTION INTRAMUSCULAR; INTRAVENOUS PRN
Status: DISCONTINUED | OUTPATIENT
Start: 2018-10-30 | End: 2018-10-30 | Stop reason: SDUPTHER

## 2018-10-30 RX ORDER — PROPOFOL 10 MG/ML
INJECTION, EMULSION INTRAVENOUS PRN
Status: DISCONTINUED | OUTPATIENT
Start: 2018-10-30 | End: 2018-10-30 | Stop reason: SDUPTHER

## 2018-10-30 RX ORDER — MIDAZOLAM HYDROCHLORIDE 1 MG/ML
INJECTION INTRAMUSCULAR; INTRAVENOUS PRN
Status: DISCONTINUED | OUTPATIENT
Start: 2018-10-30 | End: 2018-10-30 | Stop reason: SDUPTHER

## 2018-10-30 RX ORDER — WARFARIN SODIUM 5 MG/1
5 TABLET ORAL DAILY
Status: DISCONTINUED | OUTPATIENT
Start: 2018-10-30 | End: 2018-10-30

## 2018-10-30 RX ORDER — PROMETHAZINE HYDROCHLORIDE 25 MG/ML
6.25 INJECTION, SOLUTION INTRAMUSCULAR; INTRAVENOUS
Status: DISCONTINUED | OUTPATIENT
Start: 2018-10-30 | End: 2018-10-30 | Stop reason: HOSPADM

## 2018-10-30 RX ORDER — METOPROLOL TARTRATE 50 MG/1
100 TABLET, FILM COATED ORAL 2 TIMES DAILY
Status: DISCONTINUED | OUTPATIENT
Start: 2018-10-30 | End: 2018-10-30 | Stop reason: HOSPADM

## 2018-10-30 RX ORDER — SODIUM CHLORIDE 0.9 % (FLUSH) 0.9 %
10 SYRINGE (ML) INJECTION EVERY 12 HOURS SCHEDULED
Status: DISCONTINUED | OUTPATIENT
Start: 2018-10-30 | End: 2018-10-30 | Stop reason: HOSPADM

## 2018-10-30 RX ORDER — PANTOPRAZOLE SODIUM 40 MG/1
40 TABLET, DELAYED RELEASE ORAL
Status: DISCONTINUED | OUTPATIENT
Start: 2018-10-31 | End: 2018-10-30 | Stop reason: HOSPADM

## 2018-10-30 RX ORDER — SODIUM CHLORIDE 0.9 % (FLUSH) 0.9 %
10 SYRINGE (ML) INJECTION PRN
Status: DISCONTINUED | OUTPATIENT
Start: 2018-10-30 | End: 2018-10-30 | Stop reason: HOSPADM

## 2018-10-30 RX ADMIN — ROCURONIUM BROMIDE 20 MG: 10 INJECTION INTRAVENOUS at 13:07

## 2018-10-30 RX ADMIN — WARFARIN SODIUM 5 MG: 5 TABLET ORAL at 18:05

## 2018-10-30 RX ADMIN — GLYCOPYRROLATE 0.1 MG: 0.2 INJECTION, SOLUTION INTRAMUSCULAR; INTRAVENOUS at 12:50

## 2018-10-30 RX ADMIN — LIDOCAINE HYDROCHLORIDE 5 ML: 20 INJECTION, SOLUTION INFILTRATION; PERINEURAL at 12:59

## 2018-10-30 RX ADMIN — PHENYLEPHRINE HYDROCHLORIDE 100 MCG: 10 INJECTION, SOLUTION INTRAMUSCULAR; INTRAVENOUS; SUBCUTANEOUS at 13:22

## 2018-10-30 RX ADMIN — FENTANYL CITRATE 50 MCG: 50 INJECTION INTRAMUSCULAR; INTRAVENOUS at 13:05

## 2018-10-30 RX ADMIN — FENTANYL CITRATE 25 MCG: 50 INJECTION, SOLUTION INTRAMUSCULAR; INTRAVENOUS at 15:25

## 2018-10-30 RX ADMIN — ISOSORBIDE MONONITRATE 30 MG: 30 TABLET, EXTENDED RELEASE ORAL at 18:05

## 2018-10-30 RX ADMIN — SODIUM CHLORIDE: 9 INJECTION, SOLUTION INTRAVENOUS at 12:44

## 2018-10-30 RX ADMIN — ATORVASTATIN CALCIUM 40 MG: 40 TABLET, FILM COATED ORAL at 18:05

## 2018-10-30 RX ADMIN — HEPARIN SODIUM 5000 UNITS: 5000 INJECTION INTRAVENOUS; SUBCUTANEOUS at 13:17

## 2018-10-30 RX ADMIN — ENOXAPARIN SODIUM 90 MG: 100 INJECTION SUBCUTANEOUS at 15:18

## 2018-10-30 RX ADMIN — PROPOFOL 150 MG: 10 INJECTION, EMULSION INTRAVENOUS at 12:59

## 2018-10-30 RX ADMIN — WARFARIN SODIUM 5 MG: 5 TABLET ORAL at 17:08

## 2018-10-30 RX ADMIN — DEXAMETHASONE SODIUM PHOSPHATE 6 MG: 4 INJECTION, SOLUTION INTRAMUSCULAR; INTRAVENOUS at 13:14

## 2018-10-30 RX ADMIN — ROCURONIUM BROMIDE 20 MG: 10 INJECTION INTRAVENOUS at 13:10

## 2018-10-30 RX ADMIN — SODIUM CHLORIDE: 9 INJECTION, SOLUTION INTRAVENOUS at 14:13

## 2018-10-30 RX ADMIN — PHENYLEPHRINE HYDROCHLORIDE 100 MCG: 10 INJECTION, SOLUTION INTRAMUSCULAR; INTRAVENOUS; SUBCUTANEOUS at 13:18

## 2018-10-30 RX ADMIN — METFORMIN HYDROCHLORIDE 850 MG: 850 TABLET, FILM COATED ORAL at 18:05

## 2018-10-30 RX ADMIN — SUCCINYLCHOLINE CHLORIDE 120 MG: 20 INJECTION, SOLUTION INTRAMUSCULAR; INTRAVENOUS at 12:59

## 2018-10-30 RX ADMIN — PHENYLEPHRINE HYDROCHLORIDE 200 MCG: 10 INJECTION, SOLUTION INTRAMUSCULAR; INTRAVENOUS; SUBCUTANEOUS at 13:20

## 2018-10-30 RX ADMIN — MIDAZOLAM 2 MG: 1 INJECTION INTRAMUSCULAR; INTRAVENOUS at 12:59

## 2018-10-30 RX ADMIN — PHENYLEPHRINE HYDROCHLORIDE 100 MCG: 10 INJECTION, SOLUTION INTRAMUSCULAR; INTRAVENOUS; SUBCUTANEOUS at 13:37

## 2018-10-30 RX ADMIN — FENTANYL CITRATE 50 MCG: 50 INJECTION INTRAMUSCULAR; INTRAVENOUS at 12:59

## 2018-10-30 ASSESSMENT — PULMONARY FUNCTION TESTS
PIF_VALUE: 21
PIF_VALUE: 0
PIF_VALUE: 21
PIF_VALUE: 0
PIF_VALUE: 21
PIF_VALUE: 21
PIF_VALUE: 20
PIF_VALUE: 20
PIF_VALUE: 22
PIF_VALUE: 21
PIF_VALUE: 0
PIF_VALUE: 2
PIF_VALUE: 9
PIF_VALUE: 0
PIF_VALUE: 21
PIF_VALUE: 2
PIF_VALUE: 22
PIF_VALUE: 21
PIF_VALUE: 0
PIF_VALUE: 21
PIF_VALUE: 20
PIF_VALUE: 20
PIF_VALUE: 22
PIF_VALUE: 35
PIF_VALUE: 21
PIF_VALUE: 0
PIF_VALUE: 18
PIF_VALUE: 21
PIF_VALUE: 1
PIF_VALUE: 21
PIF_VALUE: 20
PIF_VALUE: 0
PIF_VALUE: 22
PIF_VALUE: 22
PIF_VALUE: 12
PIF_VALUE: 20
PIF_VALUE: 21
PIF_VALUE: 21
PIF_VALUE: 20
PIF_VALUE: 21
PIF_VALUE: 22
PIF_VALUE: 23
PIF_VALUE: 1
PIF_VALUE: 21
PIF_VALUE: 20
PIF_VALUE: 1
PIF_VALUE: 21
PIF_VALUE: 19
PIF_VALUE: 21
PIF_VALUE: 20
PIF_VALUE: 0
PIF_VALUE: 21
PIF_VALUE: 21
PIF_VALUE: 22
PIF_VALUE: 1
PIF_VALUE: 15
PIF_VALUE: 21
PIF_VALUE: 21
PIF_VALUE: 22
PIF_VALUE: 9
PIF_VALUE: 21
PIF_VALUE: 21
PIF_VALUE: 1
PIF_VALUE: 22
PIF_VALUE: 20
PIF_VALUE: 21
PIF_VALUE: 12
PIF_VALUE: 9
PIF_VALUE: 20
PIF_VALUE: 19
PIF_VALUE: 22
PIF_VALUE: 21
PIF_VALUE: 21
PIF_VALUE: 3
PIF_VALUE: 22
PIF_VALUE: 20
PIF_VALUE: 1

## 2018-10-30 ASSESSMENT — PAIN SCALES - GENERAL: PAINLEVEL_OUTOF10: 4

## 2018-10-30 ASSESSMENT — LIFESTYLE VARIABLES: SMOKING_STATUS: 1

## 2018-10-30 NOTE — DISCHARGE SUMMARY
Results   Component Value Date    INR 1.67 (H) 10/30/2018    PROTIME 19.0 (H) 10/30/2018    No results found for: BNP      Disposition: home    Patient Instructions:    Enmanuel Odonnell   Home Medication Instructions [facility]:[id number]    Printed on:10/30/18 1700   Medication Information                      atorvastatin (LIPITOR) 40 MG tablet  Take 1 tablet by mouth daily             calcium-vitamin D (OSCAL-500) 500-200 MG-UNIT per tablet  Take 2 tablets by mouth daily             digoxin (LANOXIN) 125 MCG tablet  Take 1 tablet by mouth daily             diltiazem (DILTIAZEM CD) 240 MG extended release capsule  Take 1 capsule by mouth daily             enoxaparin (LOVENOX) 100 MG/ML injection  Inject 0.9 mLs into the skin 2 times daily             flecainide (TAMBOCOR) 50 MG tablet  Take 1 tablet by mouth every 12 hours             isosorbide mononitrate (IMDUR) 30 MG extended release tablet  Take 1 tablet by mouth daily             [place] Oil 300 MG CAPS  Take 350 mg by mouth 2 times daily             lisinopril-hydrochlorothiazide (PRINZIDE;ZESTORETIC) 20-25 MG per tablet  Take 1 tablet by mouth 2 times daily             LORazepam (ATIVAN) 0.5 MG tablet  Take 1 tablet by mouth nightly             magnesium oxide (MAG-OX) 400 (241.3 Mg) MG TABS tablet  TAKE ONE TABLET BY MOUTH TWICE A DAY             metFORMIN (GLUCOPHAGE) 850 MG tablet  Take 1 tablet by mouth 3 times daily             metoprolol tartrate (LOPRESSOR) 50 MG tablet  Take 2 tablets by mouth 2 times daily             Multiple Vitamins-Minerals (THERAPEUTIC MULTIVITAMIN-MINERALS) tablet  Take 1 tablet by mouth daily             omeprazole (PRILOSEC OTC) 20 MG tablet  Take 1 tablet by mouth daily             warfarin (COUMADIN) 2.5 MG tablet  Take 2.5 mg by mouth Tues, Wed, Thurs, Sat and Sun.             warfarin (COUMADIN) 5 MG tablet  Take 1 tablet by mouth daily Or as directed by the coumadin clinic.                Current Discharge Medication List

## 2018-10-30 NOTE — H&P
needed. . 1/3/17  Yes OSWALD Prince - CNP   calcium-vitamin D (OSCAL-500) 500-200 MG-UNIT per tablet Take 2 tablets by mouth daily   Yes Historical Provider, MD   warfarin (COUMADIN) 5 MG tablet Take 1 tablet by mouth daily Or as directed by the coumadin clinic. 9/13/18   Ruby Jordan MD   warfarin (COUMADIN) 2.5 MG tablet Take 2.5 mg by mouth Tues, Wed, Thurs, Sat and Sun. Historical Provider, MD       Social History:   reports that she quit smoking about 7 weeks ago. Her smoking use included Cigarettes. She has a 22.50 pack-year smoking history. She has never used smokeless tobacco. She reports that she does not drink alcohol or use drugs. Family History:  family history includes Cancer in her father and mother; Coronary Art Dis in her father; Diabetes in her mother; High Blood Pressure in her mother; Other in her maternal grandmother. Reviewed. Denies family history of sudden cardiac death, arrhythmia, premature CAD    Review of System:    · General ROS: negative for - chills, fever +fatigue  · Psychological ROS: negative for - anxiety or depression  · Ophthalmic ROS: negative for - eye pain or loss of vision  · ENT ROS: negative for - epistaxis, headaches, nasal discharge, sore throat   · Allergy and Immunology ROS: negative for - hives, nasal congestion   · Hematological and Lymphatic ROS: negative for - bleeding problems, blood clots, bruising or jaundice  · Endocrine ROS: negative for - skin changes, temperature intolerance or unexpected weight changes  · Respiratory ROS: negative for - cough, hemoptysis, pleuritic pain, SOB, sputum changes or wheezing  · Cardiovascular ROS: Per HPI.    · Gastrointestinal ROS: negative for - abdominal pain, blood in stools, diarrhea, hematemesis, melena, nausea/vomiting or  swallowing difficulty/pain  · Genito-Urinary ROS: negative for - dysuria or incontinence  · Musculoskeletal ROS: negative for - joint swelling or muscle pain  · Neurological cardioversion) followed by an EPS/ RFA. Post DCCV, will plan to stop the cardizem and start Flecainide 50mg BID, along with pre-admission Metoprolol.    -Will schedule for RF ablation with Carto Navigation system. We will hold the Flecainide 7 days prior to the ablation. Cardiac CTA prior to procedure for pulmonary vein mapping. Continue with coumadin therapy. We will not hold coumadin prior to the ablation. Will order BMP, CBC, PT/INR, and Type & Screen prior to the procedure. Prior to the ablation for atrial fibrillation, because she is manifesting right atrial flutter, the patient was discussed the treatment options for right atrial flutter - medication for rate control (BB, CCB) or else anti-arrhythmic medications, or else EP study with ablation. The risks and benefits for all three were discussed at length and she wishes to pursue the ablation strategy. We will schedule her for such in the near future, prior to the ablation for atrial fibrillation. Thank you for allowing me to participate in the care of Danae Vega. All questions and concerns were addressed to the patient/family. Alternatives to my treatment were discussed. This note was scribed in the presence of Dr. Gideon Ledesma MD by Cathy Pierce RN. The scribe's documentation has been prepared under my direction and personally reviewed by me in its entirety. I confirm that the note above accurately reflects all work, physical examination, the discussion of treatments and procedures, and medical decision making performed by me. I have reviewed the history and physical and examined the patient and find no relevant changes. I have reviewed with the patient and/or family the risks, benefits, and alternatives to the procedure.       Aubrey Perea MD, MS, [facility], [facility]  Cardiac Electrophysiology  The [address] [place] [address]  [phone]

## 2018-10-30 NOTE — ANESTHESIA POSTPROCEDURE EVALUATION
Department of Anesthesiology  Postprocedure Note    Patient: Son Marie  MRN: [id number]  YOB: 1950  Date of evaluation: 10/30/2018  Time:  3:49 PM     Procedure Summary     Date:  10/30/18 Room / Location:  [facility] Cath Lab; [facility] Echo    Anesthesia Start:  1244 Anesthesia Stop:  1430    Procedure:  BRITTANY AFLUTTER ABLATION W/ ANES Diagnosis:      Scheduled Providers:   Responsible Provider:  Ronel Castrejon MD    Anesthesia Type:  general ASA Status:  3          Anesthesia Type: general    Sunitha Phase I: Sunitha Score: 9    Sunitha Phase II:      Last vitals: Reviewed and per EMR flowsheets.        Anesthesia Post Evaluation    Patient location during evaluation: PACU  Patient participation: complete - patient participated  Level of consciousness: awake and alert  Pain score: 2  Airway patency: patent  Nausea & Vomiting: no nausea and no vomiting  Complications: no  Cardiovascular status: blood pressure returned to baseline  Respiratory status: acceptable  Hydration status: euvolemic

## 2018-10-30 NOTE — PROCEDURES
participate in the care of your patient. If you have any questions, please feel free to contact me.     Choco Beck MD, [address], [facility] [place], [facility]  Cardiac Electrophysiology  [address] [address]   Office: [phone]

## 2018-10-31 LAB
EKG ATRIAL RATE: 83 BPM
EKG DIAGNOSIS: NORMAL
EKG P AXIS: 71 DEGREES
EKG P-R INTERVAL: 190 MS
EKG Q-T INTERVAL: 376 MS
EKG QRS DURATION: 74 MS
EKG QTC CALCULATION (BAZETT): 441 MS
EKG R AXIS: 43 DEGREES
EKG T AXIS: 180 DEGREES
EKG VENTRICULAR RATE: 83 BPM

## 2018-10-31 PROCEDURE — 93010 ELECTROCARDIOGRAM REPORT: CPT | Performed by: INTERNAL MEDICINE

## 2018-11-05 ENCOUNTER — OFFICE VISIT (OUTPATIENT)
Dept: CARDIOLOGY CLINIC | Age: 68
End: 2018-11-05
Payer: MEDICARE

## 2018-11-05 VITALS
DIASTOLIC BLOOD PRESSURE: 80 MMHG | HEART RATE: 89 BPM | HEIGHT: 66 IN | OXYGEN SATURATION: 97 % | BODY MASS INDEX: 30.67 KG/M2 | SYSTOLIC BLOOD PRESSURE: 160 MMHG | WEIGHT: 190.8 LBS

## 2018-11-05 DIAGNOSIS — I48.0 PAROXYSMAL ATRIAL FIBRILLATION (HCC): Primary | ICD-10-CM

## 2018-11-05 DIAGNOSIS — K21.9 GASTROESOPHAGEAL REFLUX DISEASE WITHOUT ESOPHAGITIS: ICD-10-CM

## 2018-11-05 DIAGNOSIS — I25.10 CORONARY ARTERY DISEASE INVOLVING NATIVE CORONARY ARTERY OF NATIVE HEART WITHOUT ANGINA PECTORIS: ICD-10-CM

## 2018-11-05 DIAGNOSIS — I10 ESSENTIAL HYPERTENSION: ICD-10-CM

## 2018-11-05 DIAGNOSIS — E78.5 DYSLIPIDEMIA: ICD-10-CM

## 2018-11-05 PROCEDURE — 1090F PRES/ABSN URINE INCON ASSESS: CPT | Performed by: INTERNAL MEDICINE

## 2018-11-05 PROCEDURE — G8417 CALC BMI ABV UP PARAM F/U: HCPCS | Performed by: INTERNAL MEDICINE

## 2018-11-05 PROCEDURE — 3017F COLORECTAL CA SCREEN DOC REV: CPT | Performed by: INTERNAL MEDICINE

## 2018-11-05 PROCEDURE — 1123F ACP DISCUSS/DSCN MKR DOCD: CPT | Performed by: INTERNAL MEDICINE

## 2018-11-05 PROCEDURE — 93000 ELECTROCARDIOGRAM COMPLETE: CPT | Performed by: INTERNAL MEDICINE

## 2018-11-05 PROCEDURE — 1101F PT FALLS ASSESS-DOCD LE1/YR: CPT | Performed by: INTERNAL MEDICINE

## 2018-11-05 PROCEDURE — G8598 ASA/ANTIPLAT THER USED: HCPCS | Performed by: INTERNAL MEDICINE

## 2018-11-05 PROCEDURE — 1111F DSCHRG MED/CURRENT MED MERGE: CPT | Performed by: INTERNAL MEDICINE

## 2018-11-05 PROCEDURE — 99214 OFFICE O/P EST MOD 30 MIN: CPT | Performed by: INTERNAL MEDICINE

## 2018-11-05 PROCEDURE — G8400 PT W/DXA NO RESULTS DOC: HCPCS | Performed by: INTERNAL MEDICINE

## 2018-11-05 PROCEDURE — G8427 DOCREV CUR MEDS BY ELIG CLIN: HCPCS | Performed by: INTERNAL MEDICINE

## 2018-11-05 PROCEDURE — 4004F PT TOBACCO SCREEN RCVD TLK: CPT | Performed by: INTERNAL MEDICINE

## 2018-11-05 PROCEDURE — 4040F PNEUMOC VAC/ADMIN/RCVD: CPT | Performed by: INTERNAL MEDICINE

## 2018-11-05 PROCEDURE — G8484 FLU IMMUNIZE NO ADMIN: HCPCS | Performed by: INTERNAL MEDICINE

## 2018-11-05 RX ORDER — DILTIAZEM HYDROCHLORIDE 360 MG/1
360 CAPSULE, EXTENDED RELEASE ORAL DAILY
Qty: 30 CAPSULE | Refills: 3 | Status: SHIPPED | OUTPATIENT
Start: 2018-11-05 | End: 2019-04-08 | Stop reason: SDUPTHER

## 2018-11-05 ASSESSMENT — ENCOUNTER SYMPTOMS
BLOOD IN STOOL: 0
EYE PAIN: 0
SHORTNESS OF BREATH: 1
CHEST TIGHTNESS: 0
COLOR CHANGE: 0
WHEEZING: 0
ABDOMINAL PAIN: 0
EYE REDNESS: 0
COUGH: 0

## 2018-11-05 NOTE — LETTER
I confirm that the note above accurately reflects all work, treatment, procedures, and medical decision making performed by me. Chest pain. Atypical. EKG 10/2017 NSR, T wave inversions V3-V6 consider ischemia- unchanged. .         Plan  No evidence of CHF. Atypical chest pain. No angina. Currently in atrial flutter per ECG. Continue BB, CCB, warfarin, statin and ACE. Risk factor modification also discussed. Increase diltiazem to 360 mg daily. Will discuss flecainide therapy further with Dr. Adrienne Rivera as well as future atrial fibrillation ablation. Follow up in 6 months. This note was scribed in the presence of Lolly Allen MD by General Mackay, RN. The scribes documentation has been prepared under my direction and personally reviewed by me in its entirety. I confirm that the note above accurately reflects all work, treatment, procedures, and medical decision making performed by me. If you have questions, please do not hesitate to call me. I look forward to following Sharonda Lakai along with you.     Sincerely,        Lolly Allen MD

## 2018-11-06 ENCOUNTER — TELEPHONE (OUTPATIENT)
Dept: CARDIOLOGY CLINIC | Age: 68
End: 2018-11-06

## 2018-11-07 NOTE — COMMUNICATION BODY
Assessment:       Plan:     MICHAEL Perrin reports feeling better than prior to ablation. She denies any chest pain at rest or exertion. She rarely has palpitations. She denies dizziness, syncope. She presented to [facility] with elevated HR. Underwent aflutter ablation with Dr. Kevin Rubio on 10/30/18. She was started on flecainide and digoxin. Assessment   Atrial fibrillation ([facility] [address] Taking amiodarone and Coumadin. Mg 1.1, K normal TSH normal 4/2018. Taking Mg supplements. Atrial flutter ablation 10/30/18 Dr. Kevin Rubio and scheduled for persistent afib ablation in future.  CAD (coronary artery disease)     NSTEMI 1/201-->Echo LVEF normal, grade II diastolic dysfx, LAE. Cath 1/27/17 70-75% LAD, FFR LAD 0.80, 50% PLD- Med tx.  GERD (gastroesophageal reflux disease)    Dyslipidemia. LDL 45. Taking statin.  DM, managed by PCP.  Hypertension-uncontrolled.  Tobacco use disorder. Cessation discussed. Chest pain. Atypical. EKG 10/2017 NSR, T wave inversions V3-V6 consider ischemia- unchanged. .         Plan  No evidence of CHF. Atypical chest pain. No angina. Currently in atrial flutter per ECG. Continue BB, CCB, warfarin, statin and ACE. Risk factor modification also discussed. Increase diltiazem to 360 mg daily. Will discuss flecainide therapy further with Dr. Kevin Rubio as well as future atrial fibrillation ablation. Follow up in 6 months. This note was scribed in the presence of Sanjeev Gómez MD by General Mackay, RN. The scribes documentation has been prepared under my direction and personally reviewed by me in its entirety. I confirm that the note above accurately reflects all work, treatment, procedures, and medical decision making performed by me. Assessment   Atrial fibrillation ([facility] [address] Taking amiodarone and Coumadin. Mg 1.1, K normal TSH normal 4/2018. Taking Mg supplements.  Atrial flutter ablation 10/30/18 Dr. Kevin Rubio and scheduled for persistent afib ablation in

## 2019-01-07 ENCOUNTER — HOSPITAL ENCOUNTER (OUTPATIENT)
Dept: CT IMAGING | Age: 69
Discharge: HOME | End: 2019-01-07
Payer: MEDICARE

## 2019-01-07 DIAGNOSIS — I48.0 PAROXYSMAL A-FIB (HCC): ICD-10-CM

## 2019-01-07 DIAGNOSIS — I48.3 TYPICAL ATRIAL FLUTTER (HCC): ICD-10-CM

## 2019-01-07 LAB
ANION GAP SERPL CALCULATED.3IONS-SCNC: 19 MMOL/L (ref 3–16)
BUN BLDV-MCNC: 13 MG/DL (ref 7–20)
CALCIUM SERPL-MCNC: 9.5 MG/DL (ref 8.3–10.6)
CHLORIDE BLD-SCNC: 93 MMOL/L (ref 99–110)
CO2: 26 MMOL/L (ref 21–32)
CREAT SERPL-MCNC: 0.7 MG/DL (ref 0.6–1.2)
DIGOXIN LEVEL: 0.5 NG/ML (ref 0.8–2)
GFR AFRICAN AMERICAN: >60
GFR AFRICAN AMERICAN: >60
GFR NON-AFRICAN AMERICAN: >60
GFR NON-AFRICAN AMERICAN: >60
GLUCOSE BLD-MCNC: 196 MG/DL (ref 70–99)
HCT VFR BLD CALC: 42.8 % (ref 36–48)
HEMOGLOBIN: 14.4 G/DL (ref 12–16)
INR BLD: 3.18 (ref 0.86–1.14)
MCH RBC QN AUTO: 29.3 PG (ref 26–34)
MCHC RBC AUTO-ENTMCNC: 33.7 G/DL (ref 31–36)
MCV RBC AUTO: 86.9 FL (ref 80–100)
PDW BLD-RTO: 16.2 % (ref 12.4–15.4)
PERFORMED ON: NORMAL
PLATELET # BLD: 273 K/UL (ref 135–450)
PMV BLD AUTO: 9.1 FL (ref 5–10.5)
POC CREATININE: 0.9 MG/DL (ref 0.6–1.2)
POC SAMPLE TYPE: NORMAL
POTASSIUM SERPL-SCNC: 3.3 MMOL/L (ref 3.5–5.1)
PROTHROMBIN TIME: 36.2 SEC (ref 9.8–13)
RBC # BLD: 4.92 M/UL (ref 4–5.2)
SODIUM BLD-SCNC: 138 MMOL/L (ref 136–145)
WBC # BLD: 11.7 K/UL (ref 4–11)

## 2019-01-07 PROCEDURE — 75574 CT ANGIO HRT W/3D IMAGE: CPT

## 2019-01-07 PROCEDURE — 75572 CT HRT W/3D IMAGE: CPT

## 2019-01-07 PROCEDURE — 6360000004 HC RX CONTRAST MEDICATION: Performed by: NURSE PRACTITIONER

## 2019-01-07 PROCEDURE — 82565 ASSAY OF CREATININE: CPT

## 2019-01-07 RX ADMIN — IOPAMIDOL 75 ML: 755 INJECTION, SOLUTION INTRAVENOUS at 10:52

## 2019-01-09 ENCOUNTER — ANESTHESIA EVENT (OUTPATIENT)
Dept: CARDIAC CATH/INVASIVE PROCEDURES | Age: 69
End: 2019-01-09
Payer: MEDICARE

## 2019-01-10 ENCOUNTER — ANESTHESIA (OUTPATIENT)
Dept: CARDIAC CATH/INVASIVE PROCEDURES | Age: 69
End: 2019-01-10
Payer: MEDICARE

## 2019-01-10 ENCOUNTER — HOSPITAL ENCOUNTER (OUTPATIENT)
Dept: CARDIAC CATH/INVASIVE PROCEDURES | Age: 69
Discharge: HOME | End: 2019-01-10
Attending: INTERNAL MEDICINE | Admitting: INTERNAL MEDICINE
Payer: MEDICARE

## 2019-01-10 VITALS
DIASTOLIC BLOOD PRESSURE: 70 MMHG | WEIGHT: 188 LBS | TEMPERATURE: 98.2 F | SYSTOLIC BLOOD PRESSURE: 130 MMHG | HEART RATE: 79 BPM | RESPIRATION RATE: 15 BRPM | OXYGEN SATURATION: 94 % | HEIGHT: 66 IN | BODY MASS INDEX: 30.22 KG/M2

## 2019-01-10 VITALS
DIASTOLIC BLOOD PRESSURE: 58 MMHG | RESPIRATION RATE: 15 BRPM | SYSTOLIC BLOOD PRESSURE: 112 MMHG | OXYGEN SATURATION: 100 %

## 2019-01-10 DIAGNOSIS — I48.19 PERSISTENT ATRIAL FIBRILLATION (HCC): ICD-10-CM

## 2019-01-10 PROBLEM — I48.91 ATRIAL FIBRILLATION (HCC): Status: ACTIVE | Noted: 2019-01-10

## 2019-01-10 LAB
EKG ATRIAL RATE: 357 BPM
EKG DIAGNOSIS: NORMAL
EKG Q-T INTERVAL: 396 MS
EKG QRS DURATION: 82 MS
EKG QTC CALCULATION (BAZETT): 439 MS
EKG R AXIS: 36 DEGREES
EKG T AXIS: -88 DEGREES
EKG VENTRICULAR RATE: 74 BPM
INR BLD: 3.71 (ref 0.86–1.14)
PROTHROMBIN TIME: 42.3 SEC (ref 9.8–13)

## 2019-01-10 PROCEDURE — 3700000000 HC ANESTHESIA ATTENDED CARE

## 2019-01-10 PROCEDURE — 3700000001 HC ADD 15 MINUTES (ANESTHESIA)

## 2019-01-10 PROCEDURE — 7100000000 HC PACU RECOVERY - FIRST 15 MIN

## 2019-01-10 PROCEDURE — 2500000003 HC RX 250 WO HCPCS

## 2019-01-10 PROCEDURE — 7100000001 HC PACU RECOVERY - ADDTL 15 MIN

## 2019-01-10 PROCEDURE — 93005 ELECTROCARDIOGRAM TRACING: CPT | Performed by: INTERNAL MEDICINE

## 2019-01-10 PROCEDURE — 2580000003 HC RX 258: Performed by: ANESTHESIOLOGY

## 2019-01-10 PROCEDURE — 6360000002 HC RX W HCPCS: Performed by: NURSE ANESTHETIST, CERTIFIED REGISTERED

## 2019-01-10 PROCEDURE — 99212 OFFICE O/P EST SF 10 MIN: CPT

## 2019-01-10 PROCEDURE — 2580000003 HC RX 258

## 2019-01-10 PROCEDURE — 85610 PROTHROMBIN TIME: CPT

## 2019-01-10 PROCEDURE — 93010 ELECTROCARDIOGRAM REPORT: CPT | Performed by: INTERNAL MEDICINE

## 2019-01-10 PROCEDURE — 2500000003 HC RX 250 WO HCPCS: Performed by: NURSE ANESTHETIST, CERTIFIED REGISTERED

## 2019-01-10 PROCEDURE — 6360000002 HC RX W HCPCS

## 2019-01-10 RX ORDER — LIDOCAINE HYDROCHLORIDE 10 MG/ML
INJECTION, SOLUTION INFILTRATION; PERINEURAL PRN
Status: DISCONTINUED | OUTPATIENT
Start: 2019-01-10 | End: 2019-01-10 | Stop reason: SDUPTHER

## 2019-01-10 RX ORDER — MIDAZOLAM HYDROCHLORIDE 1 MG/ML
INJECTION INTRAMUSCULAR; INTRAVENOUS PRN
Status: DISCONTINUED | OUTPATIENT
Start: 2019-01-10 | End: 2019-01-10 | Stop reason: SDUPTHER

## 2019-01-10 RX ORDER — SUCCINYLCHOLINE CHLORIDE 20 MG/ML
INJECTION INTRAMUSCULAR; INTRAVENOUS PRN
Status: DISCONTINUED | OUTPATIENT
Start: 2019-01-10 | End: 2019-01-10 | Stop reason: SDUPTHER

## 2019-01-10 RX ORDER — DEXAMETHASONE SODIUM PHOSPHATE 4 MG/ML
INJECTION, SOLUTION INTRA-ARTICULAR; INTRALESIONAL; INTRAMUSCULAR; INTRAVENOUS; SOFT TISSUE PRN
Status: DISCONTINUED | OUTPATIENT
Start: 2019-01-10 | End: 2019-01-10 | Stop reason: SDUPTHER

## 2019-01-10 RX ORDER — PROPOFOL 10 MG/ML
INJECTION, EMULSION INTRAVENOUS PRN
Status: DISCONTINUED | OUTPATIENT
Start: 2019-01-10 | End: 2019-01-10 | Stop reason: SDUPTHER

## 2019-01-10 RX ORDER — FENTANYL CITRATE 50 UG/ML
INJECTION, SOLUTION INTRAMUSCULAR; INTRAVENOUS PRN
Status: DISCONTINUED | OUTPATIENT
Start: 2019-01-10 | End: 2019-01-10 | Stop reason: SDUPTHER

## 2019-01-10 RX ORDER — SODIUM CHLORIDE 0.9 % (FLUSH) 0.9 %
10 SYRINGE (ML) INJECTION PRN
Status: DISCONTINUED | OUTPATIENT
Start: 2019-01-10 | End: 2019-01-10 | Stop reason: SDUPTHER

## 2019-01-10 RX ORDER — GLYCOPYRROLATE 0.2 MG/ML
INJECTION INTRAMUSCULAR; INTRAVENOUS PRN
Status: DISCONTINUED | OUTPATIENT
Start: 2019-01-10 | End: 2019-01-10 | Stop reason: SDUPTHER

## 2019-01-10 RX ORDER — SODIUM CHLORIDE 0.9 % (FLUSH) 0.9 %
10 SYRINGE (ML) INJECTION EVERY 12 HOURS SCHEDULED
Status: DISCONTINUED | OUTPATIENT
Start: 2019-01-10 | End: 2019-01-10 | Stop reason: SDUPTHER

## 2019-01-10 RX ORDER — SODIUM CHLORIDE 9 MG/ML
INJECTION, SOLUTION INTRAVENOUS CONTINUOUS
Status: DISCONTINUED | OUTPATIENT
Start: 2019-01-10 | End: 2019-01-10 | Stop reason: HOSPADM

## 2019-01-10 RX ORDER — LABETALOL HYDROCHLORIDE 5 MG/ML
5 INJECTION, SOLUTION INTRAVENOUS EVERY 10 MIN PRN
Status: DISCONTINUED | OUTPATIENT
Start: 2019-01-10 | End: 2019-01-10 | Stop reason: HOSPADM

## 2019-01-10 RX ORDER — ONDANSETRON 2 MG/ML
INJECTION INTRAMUSCULAR; INTRAVENOUS PRN
Status: DISCONTINUED | OUTPATIENT
Start: 2019-01-10 | End: 2019-01-10 | Stop reason: SDUPTHER

## 2019-01-10 RX ORDER — VECURONIUM BROMIDE 1 MG/ML
INJECTION, POWDER, LYOPHILIZED, FOR SOLUTION INTRAVENOUS PRN
Status: DISCONTINUED | OUTPATIENT
Start: 2019-01-10 | End: 2019-01-10 | Stop reason: SDUPTHER

## 2019-01-10 RX ORDER — FENTANYL CITRATE 50 UG/ML
25 INJECTION, SOLUTION INTRAMUSCULAR; INTRAVENOUS EVERY 5 MIN PRN
Status: DISCONTINUED | OUTPATIENT
Start: 2019-01-10 | End: 2019-01-10 | Stop reason: HOSPADM

## 2019-01-10 RX ORDER — PROMETHAZINE HYDROCHLORIDE 25 MG/ML
6.25 INJECTION, SOLUTION INTRAMUSCULAR; INTRAVENOUS
Status: DISCONTINUED | OUTPATIENT
Start: 2019-01-10 | End: 2019-01-10 | Stop reason: HOSPADM

## 2019-01-10 RX ORDER — SODIUM CHLORIDE 9 MG/ML
INJECTION, SOLUTION INTRAVENOUS CONTINUOUS
Status: DISCONTINUED | OUTPATIENT
Start: 2019-01-10 | End: 2019-01-10 | Stop reason: SDUPTHER

## 2019-01-10 RX ORDER — SODIUM CHLORIDE 0.9 % (FLUSH) 0.9 %
10 SYRINGE (ML) INJECTION PRN
Status: DISCONTINUED | OUTPATIENT
Start: 2019-01-10 | End: 2019-01-10 | Stop reason: HOSPADM

## 2019-01-10 RX ORDER — SODIUM CHLORIDE 0.9 % (FLUSH) 0.9 %
10 SYRINGE (ML) INJECTION EVERY 12 HOURS SCHEDULED
Status: DISCONTINUED | OUTPATIENT
Start: 2019-01-10 | End: 2019-01-10 | Stop reason: HOSPADM

## 2019-01-10 RX ADMIN — GLYCOPYRROLATE 0.2 MG: 0.2 INJECTION, SOLUTION INTRAMUSCULAR; INTRAVENOUS at 07:58

## 2019-01-10 RX ADMIN — SODIUM CHLORIDE: 9 INJECTION, SOLUTION INTRAVENOUS at 07:44

## 2019-01-10 RX ADMIN — MIDAZOLAM 2 MG: 1 INJECTION INTRAMUSCULAR; INTRAVENOUS at 07:47

## 2019-01-10 RX ADMIN — VECURONIUM BROMIDE 7 MG: 1 INJECTION, POWDER, LYOPHILIZED, FOR SOLUTION INTRAVENOUS at 07:55

## 2019-01-10 RX ADMIN — ONDANSETRON 4 MG: 2 INJECTION INTRAMUSCULAR; INTRAVENOUS at 07:58

## 2019-01-10 RX ADMIN — DEXAMETHASONE SODIUM PHOSPHATE 8 MG: 4 INJECTION, SOLUTION INTRAMUSCULAR; INTRAVENOUS at 07:58

## 2019-01-10 RX ADMIN — FENTANYL CITRATE 100 MCG: 50 INJECTION INTRAMUSCULAR; INTRAVENOUS at 07:51

## 2019-01-10 RX ADMIN — SUGAMMADEX 350 MG: 100 INJECTION, SOLUTION INTRAVENOUS at 08:14

## 2019-01-10 RX ADMIN — LIDOCAINE HYDROCHLORIDE 50 MG: 10 INJECTION, SOLUTION INFILTRATION; PERINEURAL at 07:51

## 2019-01-10 RX ADMIN — SUCCINYLCHOLINE CHLORIDE 120 MG: 20 INJECTION, SOLUTION INTRAMUSCULAR; INTRAVENOUS at 07:51

## 2019-01-10 RX ADMIN — PROPOFOL 150 MG: 10 INJECTION, EMULSION INTRAVENOUS at 07:51

## 2019-01-10 ASSESSMENT — PULMONARY FUNCTION TESTS
PIF_VALUE: 14
PIF_VALUE: 17
PIF_VALUE: 16
PIF_VALUE: 17
PIF_VALUE: 1
PIF_VALUE: 16
PIF_VALUE: 1
PIF_VALUE: 0
PIF_VALUE: 18
PIF_VALUE: 1
PIF_VALUE: 21
PIF_VALUE: 17
PIF_VALUE: 19
PIF_VALUE: 14
PIF_VALUE: 18
PIF_VALUE: 17
PIF_VALUE: 1
PIF_VALUE: 17
PIF_VALUE: 14
PIF_VALUE: 1
PIF_VALUE: 15
PIF_VALUE: 14
PIF_VALUE: 14
PIF_VALUE: 0
PIF_VALUE: 7
PIF_VALUE: 14
PIF_VALUE: 17
PIF_VALUE: 14
PIF_VALUE: 14
PIF_VALUE: 16
PIF_VALUE: 14
PIF_VALUE: 16
PIF_VALUE: 17
PIF_VALUE: 17
PIF_VALUE: 1
PIF_VALUE: 16
PIF_VALUE: 14
PIF_VALUE: 17
PIF_VALUE: 17

## 2019-01-10 ASSESSMENT — PAIN SCALES - GENERAL: PAINLEVEL_OUTOF10: 0

## 2019-02-12 ENCOUNTER — TELEPHONE (OUTPATIENT)
Dept: CARDIOLOGY CLINIC | Age: 69
End: 2019-02-12

## 2019-02-25 ENCOUNTER — TELEPHONE (OUTPATIENT)
Dept: CARDIOLOGY CLINIC | Age: 69
End: 2019-02-25

## 2019-02-25 DIAGNOSIS — I48.0 PAROXYSMAL ATRIAL FIBRILLATION (HCC): Primary | ICD-10-CM

## 2019-02-26 ENCOUNTER — TELEPHONE (OUTPATIENT)
Dept: CARDIOLOGY CLINIC | Age: 69
End: 2019-02-26

## 2019-02-26 DIAGNOSIS — I48.0 PAROXYSMAL ATRIAL FIBRILLATION (HCC): Primary | ICD-10-CM

## 2019-03-11 ENCOUNTER — TELEPHONE (OUTPATIENT)
Dept: CARDIOLOGY CLINIC | Age: 69
End: 2019-03-11

## 2019-03-11 DIAGNOSIS — E87.6 HYPOKALEMIA: Primary | ICD-10-CM

## 2019-03-18 ENCOUNTER — ANESTHESIA EVENT (OUTPATIENT)
Dept: CARDIAC CATH/INVASIVE PROCEDURES | Age: 69
End: 2019-03-18

## 2019-03-19 ENCOUNTER — ANESTHESIA (OUTPATIENT)
Dept: CARDIAC CATH/INVASIVE PROCEDURES | Age: 69
End: 2019-03-19

## 2019-03-19 ENCOUNTER — HOSPITAL ENCOUNTER (INPATIENT)
Dept: CARDIAC CATH/INVASIVE PROCEDURES | Age: 69
LOS: 1 days | Discharge: HOME | DRG: 274 | End: 2019-03-20
Attending: INTERNAL MEDICINE | Admitting: INTERNAL MEDICINE
Payer: MEDICARE

## 2019-03-19 VITALS
DIASTOLIC BLOOD PRESSURE: 81 MMHG | OXYGEN SATURATION: 100 % | TEMPERATURE: 99.1 F | RESPIRATION RATE: 14 BRPM | SYSTOLIC BLOOD PRESSURE: 120 MMHG

## 2019-03-19 DIAGNOSIS — I48.3 TYPICAL ATRIAL FLUTTER (HCC): ICD-10-CM

## 2019-03-19 PROBLEM — Z98.890 S/P ABLATION OF ATRIAL FIBRILLATION: Status: ACTIVE | Noted: 2019-03-19

## 2019-03-19 PROBLEM — I48.19 PERSISTENT ATRIAL FIBRILLATION (HCC): Status: ACTIVE | Noted: 2019-03-19

## 2019-03-19 PROBLEM — Z86.79 S/P ABLATION OF ATRIAL FIBRILLATION: Status: ACTIVE | Noted: 2019-03-19

## 2019-03-19 LAB
ABO/RH: NORMAL
ACTIVATED CLOTTING TIME: 310 SEC (ref 99–130)
ACTIVATED CLOTTING TIME: 371 SEC (ref 99–130)
ACTIVATED CLOTTING TIME: 382 SEC (ref 99–130)
ACTIVATED CLOTTING TIME: 406 SEC (ref 99–130)
ACTIVATED CLOTTING TIME: 413 SEC (ref 99–130)
ACTIVATED CLOTTING TIME: 422 SEC (ref 99–130)
ACTIVATED CLOTTING TIME: 440 SEC (ref 99–130)
ACTIVATED CLOTTING TIME: 447 SEC (ref 99–130)
ACTIVATED CLOTTING TIME: 456 SEC (ref 99–130)
ACTIVATED CLOTTING TIME: 457 SEC (ref 99–130)
ANTIBODY SCREEN: NORMAL
GLUCOSE BLD-MCNC: 263 MG/DL (ref 70–99)
INR BLD: 3 (ref 0.85–1.15)
INR BLD: 4.7 (ref 0.86–1.14)
MAGNESIUM: 0.9 MG/DL (ref 1.8–2.4)
PERFORMED ON: ABNORMAL
POC ACT LR: 358 SEC
POC POTASSIUM: 2.5 MMOL/L (ref 3.5–5.1)
POC POTASSIUM: 3.3 MMOL/L (ref 3.5–5.1)
POC SAMPLE TYPE: ABNORMAL
POC SAMPLE TYPE: ABNORMAL
POTASSIUM SERPL-SCNC: 2.8 MMOL/L (ref 3.5–5.1)
POTASSIUM SERPL-SCNC: 3.5 MMOL/L (ref 3.5–5.1)
POTASSIUM SERPL-SCNC: 3.5 MMOL/L (ref 3.5–5.1)
PROTHROMBIN TIME: 53.6 SEC (ref 9.8–13)

## 2019-03-19 PROCEDURE — 6360000002 HC RX W HCPCS: Performed by: INTERNAL MEDICINE

## 2019-03-19 PROCEDURE — 93662 INTRACARDIAC ECG (ICE): CPT | Performed by: FAMILY MEDICINE

## 2019-03-19 PROCEDURE — 93655 ICAR CATH ABLTJ DSCRT ARRHYT: CPT | Performed by: FAMILY MEDICINE

## 2019-03-19 PROCEDURE — 93656 COMPRE EP EVAL ABLTJ ATR FIB: CPT | Performed by: INTERNAL MEDICINE

## 2019-03-19 PROCEDURE — 2580000003 HC RX 258

## 2019-03-19 PROCEDURE — 5A2204Z RESTORATION OF CARDIAC RHYTHM, SINGLE: ICD-10-PCS | Performed by: INTERNAL MEDICINE

## 2019-03-19 PROCEDURE — 93613 INTRACARDIAC EPHYS 3D MAPG: CPT | Performed by: FAMILY MEDICINE

## 2019-03-19 PROCEDURE — 86850 RBC ANTIBODY SCREEN: CPT

## 2019-03-19 PROCEDURE — C1730 CATH, EP, 19 OR FEW ELECT: HCPCS

## 2019-03-19 PROCEDURE — 84132 ASSAY OF SERUM POTASSIUM: CPT

## 2019-03-19 PROCEDURE — 2580000003 HC RX 258: Performed by: ANESTHESIOLOGY

## 2019-03-19 PROCEDURE — 2500000003 HC RX 250 WO HCPCS

## 2019-03-19 PROCEDURE — 2720000010 HC SURG SUPPLY STERILE

## 2019-03-19 PROCEDURE — 93662 INTRACARDIAC ECG (ICE): CPT | Performed by: INTERNAL MEDICINE

## 2019-03-19 PROCEDURE — 6360000002 HC RX W HCPCS: Performed by: ANESTHESIOLOGY

## 2019-03-19 PROCEDURE — 7100000000 HC PACU RECOVERY - FIRST 15 MIN

## 2019-03-19 PROCEDURE — C1759 CATH, INTRA ECHOCARDIOGRAPHY: HCPCS

## 2019-03-19 PROCEDURE — 86900 BLOOD TYPING SEROLOGIC ABO: CPT

## 2019-03-19 PROCEDURE — 93657 TX L/R ATRIAL FIB ADDL: CPT | Performed by: FAMILY MEDICINE

## 2019-03-19 PROCEDURE — 93656 COMPRE EP EVAL ABLTJ ATR FIB: CPT | Performed by: FAMILY MEDICINE

## 2019-03-19 PROCEDURE — 85610 PROTHROMBIN TIME: CPT

## 2019-03-19 PROCEDURE — 4A0234Z MEASUREMENT OF CARDIAC ELECTRICAL ACTIVITY, PERCUTANEOUS APPROACH: ICD-10-PCS | Performed by: INTERNAL MEDICINE

## 2019-03-19 PROCEDURE — 86901 BLOOD TYPING SEROLOGIC RH(D): CPT

## 2019-03-19 PROCEDURE — 7100000001 HC PACU RECOVERY - ADDTL 15 MIN

## 2019-03-19 PROCEDURE — 2580000003 HC RX 258: Performed by: NURSE ANESTHETIST, CERTIFIED REGISTERED

## 2019-03-19 PROCEDURE — 93657 TX L/R ATRIAL FIB ADDL: CPT | Performed by: INTERNAL MEDICINE

## 2019-03-19 PROCEDURE — 6370000000 HC RX 637 (ALT 250 FOR IP): Performed by: INTERNAL MEDICINE

## 2019-03-19 PROCEDURE — 02K83ZZ MAP CONDUCTION MECHANISM, PERCUTANEOUS APPROACH: ICD-10-PCS | Performed by: INTERNAL MEDICINE

## 2019-03-19 PROCEDURE — 6360000002 HC RX W HCPCS

## 2019-03-19 PROCEDURE — C1732 CATH, EP, DIAG/ABL, 3D/VECT: HCPCS

## 2019-03-19 PROCEDURE — 3700000000 HC ANESTHESIA ATTENDED CARE

## 2019-03-19 PROCEDURE — 36415 COLL VENOUS BLD VENIPUNCTURE: CPT

## 2019-03-19 PROCEDURE — 93613 INTRACARDIAC EPHYS 3D MAPG: CPT | Performed by: INTERNAL MEDICINE

## 2019-03-19 PROCEDURE — 6360000002 HC RX W HCPCS: Performed by: NURSE ANESTHETIST, CERTIFIED REGISTERED

## 2019-03-19 PROCEDURE — C1894 INTRO/SHEATH, NON-LASER: HCPCS

## 2019-03-19 PROCEDURE — B246ZZZ ULTRASONOGRAPHY OF RIGHT AND LEFT HEART: ICD-10-PCS | Performed by: INTERNAL MEDICINE

## 2019-03-19 PROCEDURE — 93623 PRGRMD STIMJ&PACG IV RX NFS: CPT | Performed by: INTERNAL MEDICINE

## 2019-03-19 PROCEDURE — 2500000003 HC RX 250 WO HCPCS: Performed by: NURSE ANESTHETIST, CERTIFIED REGISTERED

## 2019-03-19 PROCEDURE — 3700000001 HC ADD 15 MINUTES (ANESTHESIA)

## 2019-03-19 PROCEDURE — 02583ZZ DESTRUCTION OF CONDUCTION MECHANISM, PERCUTANEOUS APPROACH: ICD-10-PCS | Performed by: INTERNAL MEDICINE

## 2019-03-19 PROCEDURE — 2100000000 HC CCU R&B

## 2019-03-19 PROCEDURE — 83735 ASSAY OF MAGNESIUM: CPT

## 2019-03-19 PROCEDURE — 85347 COAGULATION TIME ACTIVATED: CPT

## 2019-03-19 RX ORDER — POTASSIUM CHLORIDE 7.45 MG/ML
INJECTION INTRAVENOUS PRN
Status: DISCONTINUED | OUTPATIENT
Start: 2019-03-19 | End: 2019-03-19 | Stop reason: SDUPTHER

## 2019-03-19 RX ORDER — MORPHINE SULFATE 2 MG/ML
2 INJECTION, SOLUTION INTRAMUSCULAR; INTRAVENOUS EVERY 5 MIN PRN
Status: DISCONTINUED | OUTPATIENT
Start: 2019-03-19 | End: 2019-03-19

## 2019-03-19 RX ORDER — PHENYLEPHRINE HCL IN 0.9% NACL 1 MG/10 ML
SYRINGE (ML) INTRAVENOUS PRN
Status: DISCONTINUED | OUTPATIENT
Start: 2019-03-19 | End: 2019-03-19 | Stop reason: SDUPTHER

## 2019-03-19 RX ORDER — LIDOCAINE HYDROCHLORIDE 10 MG/ML
INJECTION, SOLUTION EPIDURAL; INFILTRATION; INTRACAUDAL; PERINEURAL PRN
Status: DISCONTINUED | OUTPATIENT
Start: 2019-03-19 | End: 2019-03-19 | Stop reason: SDUPTHER

## 2019-03-19 RX ORDER — ONDANSETRON 2 MG/ML
4 INJECTION INTRAMUSCULAR; INTRAVENOUS
Status: DISCONTINUED | OUTPATIENT
Start: 2019-03-19 | End: 2019-03-19

## 2019-03-19 RX ORDER — SODIUM CHLORIDE 9 MG/ML
INJECTION, SOLUTION INTRAVENOUS CONTINUOUS
Status: DISCONTINUED | OUTPATIENT
Start: 2019-03-19 | End: 2019-03-19 | Stop reason: SDUPTHER

## 2019-03-19 RX ORDER — ATORVASTATIN CALCIUM 40 MG/1
40 TABLET, FILM COATED ORAL DAILY
Status: DISCONTINUED | OUTPATIENT
Start: 2019-03-19 | End: 2019-03-20 | Stop reason: HOSPADM

## 2019-03-19 RX ORDER — SODIUM CHLORIDE 0.9 % (FLUSH) 0.9 %
10 SYRINGE (ML) INJECTION PRN
Status: DISCONTINUED | OUTPATIENT
Start: 2019-03-19 | End: 2019-03-19 | Stop reason: SDUPTHER

## 2019-03-19 RX ORDER — ONDANSETRON 2 MG/ML
INJECTION INTRAMUSCULAR; INTRAVENOUS PRN
Status: DISCONTINUED | OUTPATIENT
Start: 2019-03-19 | End: 2019-03-19 | Stop reason: SDUPTHER

## 2019-03-19 RX ORDER — HEPARIN SODIUM 1000 [USP'U]/ML
INJECTION, SOLUTION INTRAVENOUS; SUBCUTANEOUS PRN
Status: DISCONTINUED | OUTPATIENT
Start: 2019-03-19 | End: 2019-03-19 | Stop reason: SDUPTHER

## 2019-03-19 RX ORDER — 0.9 % SODIUM CHLORIDE 0.9 %
VIAL (ML) INJECTION PRN
Status: DISCONTINUED | OUTPATIENT
Start: 2019-03-19 | End: 2019-03-19 | Stop reason: SDUPTHER

## 2019-03-19 RX ORDER — ISOSORBIDE MONONITRATE 30 MG/1
30 TABLET, EXTENDED RELEASE ORAL DAILY
Status: DISCONTINUED | OUTPATIENT
Start: 2019-03-19 | End: 2019-03-20 | Stop reason: HOSPADM

## 2019-03-19 RX ORDER — PANTOPRAZOLE SODIUM 40 MG/1
40 TABLET, DELAYED RELEASE ORAL
Status: DISCONTINUED | OUTPATIENT
Start: 2019-03-20 | End: 2019-03-20 | Stop reason: HOSPADM

## 2019-03-19 RX ORDER — OXYCODONE HYDROCHLORIDE AND ACETAMINOPHEN 5; 325 MG/1; MG/1
1 TABLET ORAL PRN
Status: DISCONTINUED | OUTPATIENT
Start: 2019-03-19 | End: 2019-03-19

## 2019-03-19 RX ORDER — DEXAMETHASONE SODIUM PHOSPHATE 4 MG/ML
INJECTION, SOLUTION INTRA-ARTICULAR; INTRALESIONAL; INTRAMUSCULAR; INTRAVENOUS; SOFT TISSUE PRN
Status: DISCONTINUED | OUTPATIENT
Start: 2019-03-19 | End: 2019-03-19 | Stop reason: SDUPTHER

## 2019-03-19 RX ORDER — ACETAMINOPHEN 325 MG/1
650 TABLET ORAL EVERY 4 HOURS PRN
Status: DISCONTINUED | OUTPATIENT
Start: 2019-03-19 | End: 2019-03-20 | Stop reason: HOSPADM

## 2019-03-19 RX ORDER — SODIUM CHLORIDE 0.9 % (FLUSH) 0.9 %
10 SYRINGE (ML) INJECTION EVERY 12 HOURS SCHEDULED
Status: DISCONTINUED | OUTPATIENT
Start: 2019-03-19 | End: 2019-03-19 | Stop reason: SDUPTHER

## 2019-03-19 RX ORDER — MEPERIDINE HYDROCHLORIDE 25 MG/ML
12.5 INJECTION INTRAMUSCULAR; INTRAVENOUS; SUBCUTANEOUS EVERY 5 MIN PRN
Status: DISCONTINUED | OUTPATIENT
Start: 2019-03-19 | End: 2019-03-19

## 2019-03-19 RX ORDER — MAGNESIUM SULFATE IN WATER 40 MG/ML
2 INJECTION, SOLUTION INTRAVENOUS ONCE
Status: COMPLETED | OUTPATIENT
Start: 2019-03-19 | End: 2019-03-19

## 2019-03-19 RX ORDER — MIDAZOLAM HYDROCHLORIDE 1 MG/ML
INJECTION INTRAMUSCULAR; INTRAVENOUS PRN
Status: DISCONTINUED | OUTPATIENT
Start: 2019-03-19 | End: 2019-03-19 | Stop reason: SDUPTHER

## 2019-03-19 RX ORDER — LISINOPRIL AND HYDROCHLOROTHIAZIDE 12.5; 1 MG/1; MG/1
2 TABLET ORAL 2 TIMES DAILY
Status: DISCONTINUED | OUTPATIENT
Start: 2019-03-19 | End: 2019-03-20 | Stop reason: HOSPADM

## 2019-03-19 RX ORDER — FUROSEMIDE 10 MG/ML
INJECTION INTRAMUSCULAR; INTRAVENOUS PRN
Status: DISCONTINUED | OUTPATIENT
Start: 2019-03-19 | End: 2019-03-19 | Stop reason: SDUPTHER

## 2019-03-19 RX ORDER — OYSTER SHELL CALCIUM WITH VITAMIN D 500; 200 MG/1; [IU]/1
2 TABLET, FILM COATED ORAL
Status: DISCONTINUED | OUTPATIENT
Start: 2019-03-20 | End: 2019-03-20 | Stop reason: HOSPADM

## 2019-03-19 RX ORDER — FENTANYL CITRATE 50 UG/ML
25 INJECTION, SOLUTION INTRAMUSCULAR; INTRAVENOUS EVERY 5 MIN PRN
Status: DISCONTINUED | OUTPATIENT
Start: 2019-03-19 | End: 2019-03-19

## 2019-03-19 RX ORDER — FUROSEMIDE 10 MG/ML
80 INJECTION INTRAMUSCULAR; INTRAVENOUS ONCE
Status: COMPLETED | OUTPATIENT
Start: 2019-03-19 | End: 2019-03-19

## 2019-03-19 RX ORDER — METOPROLOL TARTRATE 50 MG/1
100 TABLET, FILM COATED ORAL 2 TIMES DAILY
Status: DISCONTINUED | OUTPATIENT
Start: 2019-03-19 | End: 2019-03-20 | Stop reason: HOSPADM

## 2019-03-19 RX ORDER — GLYCOPYRROLATE 0.2 MG/ML
INJECTION INTRAMUSCULAR; INTRAVENOUS PRN
Status: DISCONTINUED | OUTPATIENT
Start: 2019-03-19 | End: 2019-03-19 | Stop reason: SDUPTHER

## 2019-03-19 RX ORDER — MORPHINE SULFATE 2 MG/ML
2 INJECTION, SOLUTION INTRAMUSCULAR; INTRAVENOUS EVERY 4 HOURS PRN
Status: DISCONTINUED | OUTPATIENT
Start: 2019-03-19 | End: 2019-03-20 | Stop reason: HOSPADM

## 2019-03-19 RX ORDER — HEPARIN SODIUM 10000 [USP'U]/100ML
INJECTION, SOLUTION INTRAVENOUS CONTINUOUS PRN
Status: DISCONTINUED | OUTPATIENT
Start: 2019-03-19 | End: 2019-03-19 | Stop reason: SDUPTHER

## 2019-03-19 RX ORDER — SODIUM CHLORIDE 0.9 % (FLUSH) 0.9 %
10 SYRINGE (ML) INJECTION EVERY 12 HOURS SCHEDULED
Status: DISCONTINUED | OUTPATIENT
Start: 2019-03-19 | End: 2019-03-19

## 2019-03-19 RX ORDER — PROPOFOL 10 MG/ML
INJECTION, EMULSION INTRAVENOUS PRN
Status: DISCONTINUED | OUTPATIENT
Start: 2019-03-19 | End: 2019-03-19 | Stop reason: SDUPTHER

## 2019-03-19 RX ORDER — FENTANYL CITRATE 50 UG/ML
INJECTION, SOLUTION INTRAMUSCULAR; INTRAVENOUS PRN
Status: DISCONTINUED | OUTPATIENT
Start: 2019-03-19 | End: 2019-03-19 | Stop reason: SDUPTHER

## 2019-03-19 RX ORDER — DILTIAZEM HYDROCHLORIDE 180 MG/1
360 CAPSULE, COATED, EXTENDED RELEASE ORAL DAILY
Status: DISCONTINUED | OUTPATIENT
Start: 2019-03-19 | End: 2019-03-20 | Stop reason: HOSPADM

## 2019-03-19 RX ORDER — FLECAINIDE ACETATE 100 MG/1
50 TABLET ORAL EVERY 12 HOURS SCHEDULED
Status: DISCONTINUED | OUTPATIENT
Start: 2019-03-19 | End: 2019-03-20 | Stop reason: HOSPADM

## 2019-03-19 RX ORDER — OXYCODONE HYDROCHLORIDE AND ACETAMINOPHEN 5; 325 MG/1; MG/1
2 TABLET ORAL PRN
Status: DISCONTINUED | OUTPATIENT
Start: 2019-03-19 | End: 2019-03-19

## 2019-03-19 RX ORDER — SUCCINYLCHOLINE/SOD CL,ISO/PF 200MG/10ML
SYRINGE (ML) INTRAVENOUS PRN
Status: DISCONTINUED | OUTPATIENT
Start: 2019-03-19 | End: 2019-03-19 | Stop reason: SDUPTHER

## 2019-03-19 RX ORDER — FENTANYL CITRATE 50 UG/ML
50 INJECTION, SOLUTION INTRAMUSCULAR; INTRAVENOUS EVERY 5 MIN PRN
Status: DISCONTINUED | OUTPATIENT
Start: 2019-03-19 | End: 2019-03-19

## 2019-03-19 RX ORDER — POTASSIUM CHLORIDE 7.45 MG/ML
10 INJECTION INTRAVENOUS
Status: ACTIVE | OUTPATIENT
Start: 2019-03-19 | End: 2019-03-19

## 2019-03-19 RX ORDER — SODIUM CHLORIDE 0.9 % (FLUSH) 0.9 %
10 SYRINGE (ML) INJECTION PRN
Status: DISCONTINUED | OUTPATIENT
Start: 2019-03-19 | End: 2019-03-19

## 2019-03-19 RX ORDER — VECURONIUM BROMIDE 1 MG/ML
INJECTION, POWDER, LYOPHILIZED, FOR SOLUTION INTRAVENOUS PRN
Status: DISCONTINUED | OUTPATIENT
Start: 2019-03-19 | End: 2019-03-19 | Stop reason: SDUPTHER

## 2019-03-19 RX ORDER — MORPHINE SULFATE 2 MG/ML
1 INJECTION, SOLUTION INTRAMUSCULAR; INTRAVENOUS EVERY 5 MIN PRN
Status: DISCONTINUED | OUTPATIENT
Start: 2019-03-19 | End: 2019-03-19

## 2019-03-19 RX ORDER — SODIUM CHLORIDE 9 MG/ML
INJECTION, SOLUTION INTRAVENOUS CONTINUOUS
Status: DISCONTINUED | OUTPATIENT
Start: 2019-03-19 | End: 2019-03-19

## 2019-03-19 RX ADMIN — POTASSIUM CHLORIDE 10 MEQ: 7.46 INJECTION, SOLUTION INTRAVENOUS at 11:03

## 2019-03-19 RX ADMIN — VECURONIUM BROMIDE 3 MG: 1 INJECTION, POWDER, LYOPHILIZED, FOR SOLUTION INTRAVENOUS at 11:34

## 2019-03-19 RX ADMIN — Medication 100 MCG: at 08:36

## 2019-03-19 RX ADMIN — Medication 100 MCG: at 08:57

## 2019-03-19 RX ADMIN — Medication 200 MCG: at 08:22

## 2019-03-19 RX ADMIN — Medication 200 MCG: at 08:07

## 2019-03-19 RX ADMIN — METOPROLOL TARTRATE 100 MG: 50 TABLET ORAL at 20:55

## 2019-03-19 RX ADMIN — SODIUM CHLORIDE 10 ML: 9 INJECTION INTRAMUSCULAR; INTRAVENOUS; SUBCUTANEOUS at 07:54

## 2019-03-19 RX ADMIN — HEPARIN SODIUM 4000 UNITS: 1000 INJECTION, SOLUTION INTRAVENOUS; SUBCUTANEOUS at 08:57

## 2019-03-19 RX ADMIN — FUROSEMIDE 80 MG: 10 INJECTION, SOLUTION INTRAMUSCULAR; INTRAVENOUS at 16:14

## 2019-03-19 RX ADMIN — POTASSIUM CHLORIDE 10 MEQ: 7.46 INJECTION, SOLUTION INTRAVENOUS at 10:00

## 2019-03-19 RX ADMIN — FUROSEMIDE 60 MG: 10 INJECTION, SOLUTION INTRAVENOUS at 11:55

## 2019-03-19 RX ADMIN — SODIUM CHLORIDE: 9 INJECTION, SOLUTION INTRAVENOUS at 07:42

## 2019-03-19 RX ADMIN — Medication 120 MG: at 07:51

## 2019-03-19 RX ADMIN — VECURONIUM BROMIDE 10 MG: 1 INJECTION, POWDER, LYOPHILIZED, FOR SOLUTION INTRAVENOUS at 07:54

## 2019-03-19 RX ADMIN — FUROSEMIDE 60 MG: 10 INJECTION, SOLUTION INTRAVENOUS at 10:50

## 2019-03-19 RX ADMIN — MAGNESIUM SULFATE HEPTAHYDRATE 2 G: 40 INJECTION, SOLUTION INTRAVENOUS at 20:33

## 2019-03-19 RX ADMIN — ATORVASTATIN CALCIUM 40 MG: 40 TABLET, FILM COATED ORAL at 20:55

## 2019-03-19 RX ADMIN — VECURONIUM BROMIDE 5 MG: 1 INJECTION, POWDER, LYOPHILIZED, FOR SOLUTION INTRAVENOUS at 10:17

## 2019-03-19 RX ADMIN — LISINOPRIL AND HYDROCHLOROTHIAZIDE 2 TABLET: 12.5; 1 TABLET ORAL at 20:55

## 2019-03-19 RX ADMIN — Medication 100 MCG: at 08:50

## 2019-03-19 RX ADMIN — VECURONIUM BROMIDE 2 MG: 1 INJECTION, POWDER, LYOPHILIZED, FOR SOLUTION INTRAVENOUS at 12:14

## 2019-03-19 RX ADMIN — GLYCOPYRROLATE 0.2 MG: 0.2 INJECTION, SOLUTION INTRAMUSCULAR; INTRAVENOUS at 07:56

## 2019-03-19 RX ADMIN — PROPOFOL 100 MG: 10 INJECTION, EMULSION INTRAVENOUS at 07:51

## 2019-03-19 RX ADMIN — ONDANSETRON 4 MG: 2 INJECTION INTRAMUSCULAR; INTRAVENOUS at 07:56

## 2019-03-19 RX ADMIN — SUGAMMADEX 150 MG: 100 INJECTION, SOLUTION INTRAVENOUS at 13:15

## 2019-03-19 RX ADMIN — POTASSIUM BICARBONATE 40 MEQ: 782 TABLET, EFFERVESCENT ORAL at 20:43

## 2019-03-19 RX ADMIN — METFORMIN HYDROCHLORIDE 850 MG: 850 TABLET ORAL at 17:26

## 2019-03-19 RX ADMIN — ISOSORBIDE MONONITRATE 30 MG: 30 TABLET, EXTENDED RELEASE ORAL at 17:48

## 2019-03-19 RX ADMIN — FENTANYL CITRATE 100 MCG: 50 INJECTION INTRAMUSCULAR; INTRAVENOUS at 07:51

## 2019-03-19 RX ADMIN — MORPHINE SULFATE 2 MG: 2 INJECTION, SOLUTION INTRAMUSCULAR; INTRAVENOUS at 18:59

## 2019-03-19 RX ADMIN — DEXAMETHASONE SODIUM PHOSPHATE 8 MG: 4 INJECTION, SOLUTION INTRAMUSCULAR; INTRAVENOUS at 07:56

## 2019-03-19 RX ADMIN — HEPARIN SODIUM 4000 UNITS: 1000 INJECTION, SOLUTION INTRAVENOUS; SUBCUTANEOUS at 08:50

## 2019-03-19 RX ADMIN — VECURONIUM BROMIDE 10 MG: 1 INJECTION, POWDER, LYOPHILIZED, FOR SOLUTION INTRAVENOUS at 08:52

## 2019-03-19 RX ADMIN — ACETAMINOPHEN 650 MG: 325 TABLET, FILM COATED ORAL at 18:59

## 2019-03-19 RX ADMIN — LIDOCAINE HYDROCHLORIDE 25 MG: 10 INJECTION, SOLUTION EPIDURAL; INFILTRATION; INTRACAUDAL; PERINEURAL at 07:51

## 2019-03-19 RX ADMIN — FENTANYL CITRATE 25 MCG: 50 INJECTION, SOLUTION INTRAMUSCULAR; INTRAVENOUS at 14:09

## 2019-03-19 RX ADMIN — HEPARIN SODIUM AND DEXTROSE 7000 UNITS/HR: 10000; 5 INJECTION INTRAVENOUS at 09:22

## 2019-03-19 RX ADMIN — FUROSEMIDE 20 MG: 10 INJECTION, SOLUTION INTRAVENOUS at 09:55

## 2019-03-19 RX ADMIN — POTASSIUM CHLORIDE 10 MEQ: 7.46 INJECTION, SOLUTION INTRAVENOUS at 13:07

## 2019-03-19 RX ADMIN — POTASSIUM CHLORIDE 10 MEQ: 7.46 INJECTION, SOLUTION INTRAVENOUS at 09:06

## 2019-03-19 RX ADMIN — FLECAINIDE ACETATE 50 MG: 100 TABLET ORAL at 20:55

## 2019-03-19 RX ADMIN — DILTIAZEM HYDROCHLORIDE 360 MG: 180 CAPSULE, COATED, EXTENDED RELEASE ORAL at 20:55

## 2019-03-19 RX ADMIN — POTASSIUM CHLORIDE 10 MEQ: 7.46 INJECTION, SOLUTION INTRAVENOUS at 12:06

## 2019-03-19 RX ADMIN — FUROSEMIDE 60 MG: 10 INJECTION, SOLUTION INTRAVENOUS at 12:54

## 2019-03-19 RX ADMIN — MIDAZOLAM 2 MG: 1 INJECTION INTRAMUSCULAR; INTRAVENOUS at 07:43

## 2019-03-19 ASSESSMENT — PULMONARY FUNCTION TESTS
PIF_VALUE: 16
PIF_VALUE: 17
PIF_VALUE: 16
PIF_VALUE: 17
PIF_VALUE: 16
PIF_VALUE: 17
PIF_VALUE: 18
PIF_VALUE: 18
PIF_VALUE: 16
PIF_VALUE: 17
PIF_VALUE: 18
PIF_VALUE: 17
PIF_VALUE: 18
PIF_VALUE: 17
PIF_VALUE: 16
PIF_VALUE: 18
PIF_VALUE: 1
PIF_VALUE: 16
PIF_VALUE: 17
PIF_VALUE: 17
PIF_VALUE: 0
PIF_VALUE: 18
PIF_VALUE: 17
PIF_VALUE: 20
PIF_VALUE: 18
PIF_VALUE: 18
PIF_VALUE: 17
PIF_VALUE: 18
PIF_VALUE: 17
PIF_VALUE: 18
PIF_VALUE: 17
PIF_VALUE: 17
PIF_VALUE: 18
PIF_VALUE: 16
PIF_VALUE: 18
PIF_VALUE: 17
PIF_VALUE: 15
PIF_VALUE: 17
PIF_VALUE: 15
PIF_VALUE: 17
PIF_VALUE: 18
PIF_VALUE: 18
PIF_VALUE: 17
PIF_VALUE: 18
PIF_VALUE: 17
PIF_VALUE: 18
PIF_VALUE: 17
PIF_VALUE: 18
PIF_VALUE: 18
PIF_VALUE: 17
PIF_VALUE: 17
PIF_VALUE: 18
PIF_VALUE: 17
PIF_VALUE: 6
PIF_VALUE: 17
PIF_VALUE: 17
PIF_VALUE: 18
PIF_VALUE: 18
PIF_VALUE: 16
PIF_VALUE: 17
PIF_VALUE: 18
PIF_VALUE: 17
PIF_VALUE: 17
PIF_VALUE: 1
PIF_VALUE: 18
PIF_VALUE: 18
PIF_VALUE: 17
PIF_VALUE: 16
PIF_VALUE: 18
PIF_VALUE: 17
PIF_VALUE: 18
PIF_VALUE: 17
PIF_VALUE: 18
PIF_VALUE: 18
PIF_VALUE: 16
PIF_VALUE: 17
PIF_VALUE: 18
PIF_VALUE: 18
PIF_VALUE: 17
PIF_VALUE: 17
PIF_VALUE: 15
PIF_VALUE: 17
PIF_VALUE: 16
PIF_VALUE: 17
PIF_VALUE: 18
PIF_VALUE: 17
PIF_VALUE: 18
PIF_VALUE: 17
PIF_VALUE: 15
PIF_VALUE: 17
PIF_VALUE: 18
PIF_VALUE: 17
PIF_VALUE: 17
PIF_VALUE: 16
PIF_VALUE: 17
PIF_VALUE: 18
PIF_VALUE: 17
PIF_VALUE: 1
PIF_VALUE: 18
PIF_VALUE: 17
PIF_VALUE: 18
PIF_VALUE: 17
PIF_VALUE: 18
PIF_VALUE: 17
PIF_VALUE: 18
PIF_VALUE: 17
PIF_VALUE: 18
PIF_VALUE: 17
PIF_VALUE: 18
PIF_VALUE: 17
PIF_VALUE: 18
PIF_VALUE: 17
PIF_VALUE: 17
PIF_VALUE: 18
PIF_VALUE: 17
PIF_VALUE: 18
PIF_VALUE: 17
PIF_VALUE: 18
PIF_VALUE: 17
PIF_VALUE: 18
PIF_VALUE: 17
PIF_VALUE: 18
PIF_VALUE: 1
PIF_VALUE: 18
PIF_VALUE: 18
PIF_VALUE: 17
PIF_VALUE: 16
PIF_VALUE: 17
PIF_VALUE: 18
PIF_VALUE: 17
PIF_VALUE: 1
PIF_VALUE: 17
PIF_VALUE: 18
PIF_VALUE: 18
PIF_VALUE: 17
PIF_VALUE: 17
PIF_VALUE: 16
PIF_VALUE: 17
PIF_VALUE: 17
PIF_VALUE: 18
PIF_VALUE: 16
PIF_VALUE: 17
PIF_VALUE: 18
PIF_VALUE: 17
PIF_VALUE: 18
PIF_VALUE: 18
PIF_VALUE: 17
PIF_VALUE: 18
PIF_VALUE: 18
PIF_VALUE: 17
PIF_VALUE: 18
PIF_VALUE: 17
PIF_VALUE: 17
PIF_VALUE: 2
PIF_VALUE: 17
PIF_VALUE: 17
PIF_VALUE: 18
PIF_VALUE: 17
PIF_VALUE: 17
PIF_VALUE: 18
PIF_VALUE: 17
PIF_VALUE: 18
PIF_VALUE: 16
PIF_VALUE: 17
PIF_VALUE: 18
PIF_VALUE: 17
PIF_VALUE: 18
PIF_VALUE: 17
PIF_VALUE: 18
PIF_VALUE: 18
PIF_VALUE: 17
PIF_VALUE: 18
PIF_VALUE: 17
PIF_VALUE: 18
PIF_VALUE: 17
PIF_VALUE: 14
PIF_VALUE: 17
PIF_VALUE: 18
PIF_VALUE: 1
PIF_VALUE: 16
PIF_VALUE: 17
PIF_VALUE: 18
PIF_VALUE: 17
PIF_VALUE: 18
PIF_VALUE: 17
PIF_VALUE: 17
PIF_VALUE: 18
PIF_VALUE: 18
PIF_VALUE: 17
PIF_VALUE: 17
PIF_VALUE: 18
PIF_VALUE: 17
PIF_VALUE: 17
PIF_VALUE: 16
PIF_VALUE: 17
PIF_VALUE: 18
PIF_VALUE: 17
PIF_VALUE: 17
PIF_VALUE: 15
PIF_VALUE: 18
PIF_VALUE: 17
PIF_VALUE: 18
PIF_VALUE: 16
PIF_VALUE: 18
PIF_VALUE: 17
PIF_VALUE: 18
PIF_VALUE: 17
PIF_VALUE: 17
PIF_VALUE: 16
PIF_VALUE: 17
PIF_VALUE: 17
PIF_VALUE: 16
PIF_VALUE: 1
PIF_VALUE: 17
PIF_VALUE: 16
PIF_VALUE: 17
PIF_VALUE: 17
PIF_VALUE: 18
PIF_VALUE: 17
PIF_VALUE: 16
PIF_VALUE: 18
PIF_VALUE: 17
PIF_VALUE: 18
PIF_VALUE: 17
PIF_VALUE: 18
PIF_VALUE: 12
PIF_VALUE: 17
PIF_VALUE: 17
PIF_VALUE: 18
PIF_VALUE: 17

## 2019-03-19 ASSESSMENT — PAIN DESCRIPTION - LOCATION
LOCATION: BACK
LOCATION: HEAD;GENERALIZED
LOCATION: BACK
LOCATION: BACK

## 2019-03-19 ASSESSMENT — LIFESTYLE VARIABLES: SMOKING_STATUS: 1

## 2019-03-19 ASSESSMENT — PAIN SCALES - GENERAL
PAINLEVEL_OUTOF10: 0
PAINLEVEL_OUTOF10: 3
PAINLEVEL_OUTOF10: 0
PAINLEVEL_OUTOF10: 0
PAINLEVEL_OUTOF10: 3
PAINLEVEL_OUTOF10: 2
PAINLEVEL_OUTOF10: 5

## 2019-03-19 ASSESSMENT — PAIN DESCRIPTION - ORIENTATION
ORIENTATION: POSTERIOR
ORIENTATION: POSTERIOR
ORIENTATION: ANTERIOR
ORIENTATION: POSTERIOR

## 2019-03-19 ASSESSMENT — PAIN DESCRIPTION - FREQUENCY
FREQUENCY: CONTINUOUS

## 2019-03-19 ASSESSMENT — PAIN DESCRIPTION - PROGRESSION
CLINICAL_PROGRESSION: GRADUALLY WORSENING

## 2019-03-19 ASSESSMENT — PAIN DESCRIPTION - DESCRIPTORS
DESCRIPTORS: ACHING
DESCRIPTORS: ACHING
DESCRIPTORS: NUMBNESS;ACHING
DESCRIPTORS: ACHING

## 2019-03-19 ASSESSMENT — PAIN - FUNCTIONAL ASSESSMENT: PAIN_FUNCTIONAL_ASSESSMENT: 0-10

## 2019-03-19 ASSESSMENT — PAIN DESCRIPTION - PAIN TYPE
TYPE: CHRONIC PAIN
TYPE: SURGICAL PAIN
TYPE: CHRONIC PAIN
TYPE: CHRONIC PAIN

## 2019-03-20 VITALS
RESPIRATION RATE: 18 BRPM | SYSTOLIC BLOOD PRESSURE: 138 MMHG | TEMPERATURE: 98.2 F | BODY MASS INDEX: 28.73 KG/M2 | HEIGHT: 66 IN | HEART RATE: 88 BPM | WEIGHT: 178.79 LBS | OXYGEN SATURATION: 94 % | DIASTOLIC BLOOD PRESSURE: 67 MMHG

## 2019-03-20 LAB
ANION GAP SERPL CALCULATED.3IONS-SCNC: 16 MMOL/L (ref 3–16)
BUN BLDV-MCNC: 13 MG/DL (ref 7–20)
CALCIUM SERPL-MCNC: 6.8 MG/DL (ref 8.3–10.6)
CHLORIDE BLD-SCNC: 92 MMOL/L (ref 99–110)
CO2: 27 MMOL/L (ref 21–32)
CREAT SERPL-MCNC: 0.8 MG/DL (ref 0.6–1.2)
GFR AFRICAN AMERICAN: >60
GFR NON-AFRICAN AMERICAN: >60
GLUCOSE BLD-MCNC: 276 MG/DL (ref 70–99)
INR BLD: 4.43 (ref 0.86–1.14)
MAGNESIUM: 1.6 MG/DL (ref 1.8–2.4)
MAGNESIUM: 1.6 MG/DL (ref 1.8–2.4)
POTASSIUM SERPL-SCNC: 3.1 MMOL/L (ref 3.5–5.1)
PROTHROMBIN TIME: 50.5 SEC (ref 9.8–13)
SODIUM BLD-SCNC: 135 MMOL/L (ref 136–145)

## 2019-03-20 PROCEDURE — 99238 HOSP IP/OBS DSCHRG MGMT 30/<: CPT | Performed by: INTERNAL MEDICINE

## 2019-03-20 PROCEDURE — 94762 N-INVAS EAR/PLS OXIMTRY CONT: CPT

## 2019-03-20 PROCEDURE — 80048 BASIC METABOLIC PNL TOTAL CA: CPT

## 2019-03-20 PROCEDURE — 6360000002 HC RX W HCPCS: Performed by: INTERNAL MEDICINE

## 2019-03-20 PROCEDURE — 6370000000 HC RX 637 (ALT 250 FOR IP): Performed by: INTERNAL MEDICINE

## 2019-03-20 PROCEDURE — 85610 PROTHROMBIN TIME: CPT

## 2019-03-20 PROCEDURE — 83735 ASSAY OF MAGNESIUM: CPT

## 2019-03-20 PROCEDURE — 36415 COLL VENOUS BLD VENIPUNCTURE: CPT

## 2019-03-20 RX ORDER — POTASSIUM CHLORIDE 1.5 G/1.77G
40 POWDER, FOR SOLUTION ORAL 2 TIMES DAILY
Qty: 30 EACH | Refills: 3 | Status: SHIPPED | OUTPATIENT
Start: 2019-03-20 | End: 2019-03-22 | Stop reason: ALTCHOICE

## 2019-03-20 RX ORDER — FUROSEMIDE 10 MG/ML
40 INJECTION INTRAMUSCULAR; INTRAVENOUS ONCE
Status: COMPLETED | OUTPATIENT
Start: 2019-03-20 | End: 2019-03-20

## 2019-03-20 RX ADMIN — DILTIAZEM HYDROCHLORIDE 360 MG: 180 CAPSULE, COATED, EXTENDED RELEASE ORAL at 08:59

## 2019-03-20 RX ADMIN — PANTOPRAZOLE SODIUM 40 MG: 40 TABLET, DELAYED RELEASE ORAL at 09:02

## 2019-03-20 RX ADMIN — Medication 2 TABLET: at 08:59

## 2019-03-20 RX ADMIN — METOPROLOL TARTRATE 100 MG: 50 TABLET ORAL at 08:59

## 2019-03-20 RX ADMIN — ATORVASTATIN CALCIUM 40 MG: 40 TABLET, FILM COATED ORAL at 08:59

## 2019-03-20 RX ADMIN — LISINOPRIL AND HYDROCHLOROTHIAZIDE 2 TABLET: 12.5; 1 TABLET ORAL at 09:02

## 2019-03-20 RX ADMIN — FLECAINIDE ACETATE 50 MG: 100 TABLET ORAL at 08:59

## 2019-03-20 RX ADMIN — ISOSORBIDE MONONITRATE 30 MG: 30 TABLET, EXTENDED RELEASE ORAL at 08:59

## 2019-03-20 RX ADMIN — FUROSEMIDE 40 MG: 10 INJECTION, SOLUTION INTRAMUSCULAR; INTRAVENOUS at 08:59

## 2019-03-20 RX ADMIN — METFORMIN HYDROCHLORIDE 850 MG: 850 TABLET ORAL at 09:02

## 2019-03-20 RX ADMIN — POTASSIUM BICARBONATE 40 MEQ: 782 TABLET, EFFERVESCENT ORAL at 08:58

## 2019-03-20 ASSESSMENT — PAIN SCALES - GENERAL
PAINLEVEL_OUTOF10: 0

## 2019-03-22 RX ORDER — POTASSIUM CHLORIDE 1500 MG/1
20 TABLET, FILM COATED, EXTENDED RELEASE ORAL 2 TIMES DAILY WITH MEALS
Qty: 8 TABLET | Refills: 0 | Status: SHIPPED | OUTPATIENT
Start: 2019-03-22 | End: 2019-09-05

## 2019-03-22 RX ORDER — POTASSIUM CHLORIDE 1500 MG/1
20 TABLET, FILM COATED, EXTENDED RELEASE ORAL 2 TIMES DAILY WITH MEALS
COMMUNITY
End: 2019-03-22 | Stop reason: SDUPTHER

## 2019-04-05 RX ORDER — FLECAINIDE ACETATE 50 MG/1
50 TABLET ORAL EVERY 12 HOURS SCHEDULED
Qty: 60 TABLET | Refills: 11 | Status: SHIPPED | OUTPATIENT
Start: 2019-04-05 | End: 2019-06-12 | Stop reason: ALTCHOICE

## 2019-04-08 RX ORDER — DILTIAZEM HYDROCHLORIDE 360 MG/1
360 CAPSULE, EXTENDED RELEASE ORAL DAILY
Qty: 90 CAPSULE | Refills: 1 | Status: SHIPPED | OUTPATIENT
Start: 2019-04-08 | End: 2019-12-30

## 2019-05-09 ENCOUNTER — OFFICE VISIT (OUTPATIENT)
Dept: CARDIOLOGY CLINIC | Age: 69
End: 2019-05-09
Payer: MEDICARE

## 2019-05-09 VITALS
WEIGHT: 173 LBS | SYSTOLIC BLOOD PRESSURE: 158 MMHG | HEART RATE: 80 BPM | OXYGEN SATURATION: 98 % | BODY MASS INDEX: 27.15 KG/M2 | HEIGHT: 67 IN | DIASTOLIC BLOOD PRESSURE: 98 MMHG

## 2019-05-09 DIAGNOSIS — E78.2 MIXED HYPERLIPIDEMIA: ICD-10-CM

## 2019-05-09 DIAGNOSIS — I48.19 PERSISTENT ATRIAL FIBRILLATION (HCC): Primary | ICD-10-CM

## 2019-05-09 DIAGNOSIS — I25.118 CORONARY ARTERY DISEASE OF NATIVE ARTERY OF NATIVE HEART WITH STABLE ANGINA PECTORIS (HCC): ICD-10-CM

## 2019-05-09 DIAGNOSIS — I10 ESSENTIAL HYPERTENSION: ICD-10-CM

## 2019-05-09 PROCEDURE — G8427 DOCREV CUR MEDS BY ELIG CLIN: HCPCS | Performed by: INTERNAL MEDICINE

## 2019-05-09 PROCEDURE — 4040F PNEUMOC VAC/ADMIN/RCVD: CPT | Performed by: INTERNAL MEDICINE

## 2019-05-09 PROCEDURE — 99214 OFFICE O/P EST MOD 30 MIN: CPT | Performed by: INTERNAL MEDICINE

## 2019-05-09 PROCEDURE — 1090F PRES/ABSN URINE INCON ASSESS: CPT | Performed by: INTERNAL MEDICINE

## 2019-05-09 PROCEDURE — G8598 ASA/ANTIPLAT THER USED: HCPCS | Performed by: INTERNAL MEDICINE

## 2019-05-09 PROCEDURE — G8400 PT W/DXA NO RESULTS DOC: HCPCS | Performed by: INTERNAL MEDICINE

## 2019-05-09 PROCEDURE — 4004F PT TOBACCO SCREEN RCVD TLK: CPT | Performed by: INTERNAL MEDICINE

## 2019-05-09 PROCEDURE — 3017F COLORECTAL CA SCREEN DOC REV: CPT | Performed by: INTERNAL MEDICINE

## 2019-05-09 PROCEDURE — 1123F ACP DISCUSS/DSCN MKR DOCD: CPT | Performed by: INTERNAL MEDICINE

## 2019-05-09 PROCEDURE — G8417 CALC BMI ABV UP PARAM F/U: HCPCS | Performed by: INTERNAL MEDICINE

## 2019-05-09 RX ORDER — DILTIAZEM HYDROCHLORIDE 360 MG/1
360 CAPSULE, EXTENDED RELEASE ORAL DAILY
Qty: 30 CAPSULE | Refills: 6 | Status: SHIPPED | OUTPATIENT
Start: 2019-05-09 | End: 2019-10-17

## 2019-05-09 RX ORDER — ATORVASTATIN CALCIUM 40 MG/1
40 TABLET, FILM COATED ORAL DAILY
Qty: 30 TABLET | Refills: 5 | Status: SHIPPED | OUTPATIENT
Start: 2019-05-09 | End: 2019-11-23 | Stop reason: SDUPTHER

## 2019-05-09 RX ORDER — DILTIAZEM HYDROCHLORIDE EXTENDED-RELEASE TABLETS 240 MG/1
TABLET, EXTENDED RELEASE ORAL DAILY
COMMUNITY
End: 2019-05-09

## 2019-05-09 RX ORDER — LISINOPRIL AND HYDROCHLOROTHIAZIDE 25; 20 MG/1; MG/1
1 TABLET ORAL 2 TIMES DAILY
Qty: 60 TABLET | Refills: 3 | Status: SHIPPED | OUTPATIENT
Start: 2019-05-09 | End: 2020-01-02

## 2019-05-09 RX ORDER — ISOSORBIDE MONONITRATE 30 MG/1
30 TABLET, EXTENDED RELEASE ORAL DAILY
Qty: 30 TABLET | Refills: 5 | Status: SHIPPED | OUTPATIENT
Start: 2019-05-09 | End: 2020-02-27 | Stop reason: SDUPTHER

## 2019-05-09 RX ORDER — METOPROLOL TARTRATE 50 MG/1
100 TABLET, FILM COATED ORAL 2 TIMES DAILY
Qty: 120 TABLET | Refills: 5 | Status: SHIPPED | OUTPATIENT
Start: 2019-05-09 | End: 2019-09-05 | Stop reason: DRUGHIGH

## 2019-05-09 ASSESSMENT — ENCOUNTER SYMPTOMS
BLOOD IN STOOL: 0
SHORTNESS OF BREATH: 0
COUGH: 0
COLOR CHANGE: 0
ABDOMINAL PAIN: 0
EYE PAIN: 0
WHEEZING: 0
CHEST TIGHTNESS: 0
EYE REDNESS: 0

## 2019-05-09 NOTE — PATIENT INSTRUCTIONS
Patient Education      INCREASE DILTIAZEM TO 360MG DAILY  Atrial Fibrillation: Care Instructions  Your Care Instructions    Atrial fibrillation is an irregular and often fast heartbeat. Treating this condition is important for several reasons. It can cause blood clots, which can travel from your heart to your brain and cause a stroke. If you have a fast heartbeat, you may feel lightheaded, dizzy, and weak. An irregular heartbeat can also increase your risk for heart failure. Atrial fibrillation is often the result of another heart condition, such as high blood pressure or coronary artery disease. Making changes to improve your heart condition will help you stay healthy and active. Follow-up care is a key part of your treatment and safety. Be sure to make and go to all appointments, and call your doctor if you are having problems. It's also a good idea to know your test results and keep a list of the medicines you take. How can you care for yourself at home? Medicines    · Take your medicines exactly as prescribed. Call your doctor if you think you are having a problem with your medicine. You will get more details on the specific medicines your doctor prescribes.     · If your doctor has given you a blood thinner to prevent a stroke, be sure you get instructions about how to take your medicine safely. Blood thinners can cause serious bleeding problems.     · Do not take any vitamins, over-the-counter drugs, or herbal products without talking to your doctor first.    Lifestyle changes    · Do not smoke. Smoking can increase your chance of a stroke and heart attack. If you need help quitting, talk to your doctor about stop-smoking programs and medicines. These can increase your chances of quitting for good.     · Eat a heart-healthy diet.     · Stay at a healthy weight. Lose weight if you need to.     · Limit alcohol to 2 drinks a day for men and 1 drink a day for women.  Too much alcohol can cause health ambulance. Do not try to drive yourself.     · You have symptoms of a stroke. These may include:  ? Sudden numbness, tingling, weakness, or loss of movement in your face, arm, or leg, especially on only one side of your body. ? Sudden vision changes. ? Sudden trouble speaking. ? Sudden confusion or trouble understanding simple statements. ? Sudden problems with walking or balance. ? A sudden, severe headache that is different from past headaches.     · You passed out (lost consciousness).    Call your doctor now or seek immediate medical care if:    · You have new or increased shortness of breath.     · You feel dizzy or lightheaded, or you feel like you may faint.     · Your heart rate becomes irregular.     · You can feel your heart flutter in your chest or skip heartbeats. Tell your doctor if these symptoms are new or worse.    Watch closely for changes in your health, and be sure to contact your doctor if you have any problems. Where can you learn more? Go to https://[employer]. org and sign in to your [employer] account. Enter U020 in the An[employer]oom box to learn more about \"Atrial Fibrillation: Care Instructions. \"     If you do not have an account, please click on the \"Sign Up Now\" link. Current as of: July 22, 2018  Content Version: 12.0  © 1414-9792 [employer]. Care instructions adapted under license by [facility] ([facility]). If you have questions about a medical condition or this instruction, always ask your healthcare professional. [place] [address] any warranty or liability for your use of this information.
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
## 2019-05-29 RX ORDER — WARFARIN SODIUM 5 MG/1
TABLET ORAL
Qty: 90 TABLET | Refills: 3 | Status: SHIPPED | OUTPATIENT
Start: 2019-05-29 | End: 2020-01-29

## 2019-06-11 NOTE — PROGRESS NOTES
[address]   Electrophysiology Consultation   Date: 6/12/2019  Reason for Consultation: Afib  Consult Requesting Physician: Manisha Medrano MD  Primary Care Physician: Michel Tello MD    Chief Complaint:   Chief Complaint   Patient presents with    3 Month Follow-Up     htn,af, cad / per pt she feels great since she had her procedure has no cardiac complaints at this time                                       HPI: Cristy Perkins is a 78 y.o. with a PMH significant for NSTEMI (2017), HLD, hypothyroidism, HTN, DM and PAF first diagnosed in 2017 in the setting of acute coronary syndrome who arrived to [facility]/4/18 with complaints of palpitations and fatigue. She was found to be back in AFib RVR and her beta blocker therapy was increased for tighter rate control. She was scheduled for a DCCV but spontaneously converted on her own prior to procedure. During office follow up visit on 10/1/19 EKG confirmed she was back in afib/aflutter. She subsequently underwent successful cardioversion on 10/4/19 . She underwent right atrial flutter ablation on 10/30/18 and and atrial fibrillation ablation on 1/10/19. She is typically followed by Dr. Moreno Medel for her cardiac needs. Interval History: Today patient present to office for follow up s/p right atrial flutter ablation on 10/30/18 and atrial fibrillation ablation 1/10/19. She is compliant with her medications and tolerating them well. She denies chest pain/pressure, tightness, edema, shortness of breath, heart racing, palpitations, lightheadedness, dizziness, syncope, presyncope,  PND or orthopnea. Past Medical History:   Diagnosis Date    Atrial fibrillation ([address]     CAD (coronary artery disease)     NSTEMI 1/201-->Echo LVEF normal, grade II diastolic dysfx, LAE. Cath 1/27/17 70-75% LAD, FFR LAD 0.80, 50% PLD- Med tx.      GERD (gastroesophageal reflux disease)     High triglycerides     HLD (hyperlipidemia)     Hypertension     Menopause     Osteoarthritis     Stress incontinence     Thyroid disease     Type II or unspecified type diabetes mellitus without mention of complication, not stated as uncontrolled         Past Surgical History:   Procedure Laterality Date    APPENDECTOMY  1953    ATRIAL ABLATION SURGERY N/A 10/30/2018    BRITTANY Atrial Flutter Ablation    CHOLECYSTECTOMY  9/2006    COLONOSCOPY      FACIAL COSMETIC SURGERY  1965    TONSILLECTOMY  1952    TUBAL LIGATION         Allergies: Allergies   Allergen Reactions    Influenza Vaccines Hives       Medication:   Prior to Admission medications    Medication Sig Start Date End Date Taking? Authorizing Provider   warfarin (COUMADIN) 5 MG tablet TAKE 1 TABLET BY MOUTH EVERY DAY OR AS DIRECTED BY CLINIC 5/29/19  Yes Kylah Hanson MD   metoprolol tartrate (LOPRESSOR) 50 MG tablet Take 2 tablets by mouth 2 times daily 5/9/19  Yes Kylah Hanson MD   lisinopril-hydrochlorothiazide (PRINZIDE;ZESTORETIC) 20-25 MG per tablet Take 1 tablet by mouth 2 times daily 5/9/19  Yes Kylah Hanson MD   isosorbide mononitrate (IMDUR) 30 MG extended release tablet Take 1 tablet by mouth daily 5/9/19  Yes Kylah Hanson MD   atorvastatin (LIPITOR) 40 MG tablet Take 1 tablet by mouth daily 5/9/19  Yes Kylah Hanson MD   diltiazem (CARDIZEM CD) 360 MG extended release capsule Take 1 capsule by mouth daily 5/9/19  Yes Kylah Hanson MD   diltiazem (CARDIZEM CD) 360 MG extended release capsule Take 1 capsule by mouth daily 4/8/19  Yes Kylah Hanson MD   magnesium oxide (MAG-OX) 400 (241.3 Mg) MG TABS tablet TAKE ONE TABLET BY MOUTH TWICE A DAY 3/22/19  Yes Claudette Kapadia MD   potassium chloride (KLOR-CON M) 20 MEQ TBCR extended release tablet Take 1 tablet by mouth 2 times daily (with meals) Take 2 tablets by mouth twice a day for 4 days.  3/22/19  Yes Claudette Kapadia MD   warfarin (COUMADIN) 2.5 MG tablet Take 2.5 mg by mouth Mon, Tues, Wed, Fri, Sat   Yes Historical Provider, MD   Multiple Vitamins-Minerals (THERAPEUTIC MULTIVITAMIN-MINERALS) tablet Take 1 tablet by mouth daily   Yes Historical Provider, MD   Eber Rashmi Oil 300 MG CAPS Take 350 mg by mouth 2 times daily   Yes Historical Provider, MD   metFORMIN (GLUCOPHAGE) 850 MG tablet Take 1 tablet by mouth 3 times daily 1/3/17  Yes OSWALD Samayoa CNP   omeprazole (PRILOSEC OTC) 20 MG tablet Take 1 tablet by mouth daily 1/3/17  Yes OSWALD Samayoa CNP   LORazepam (ATIVAN) 0.5 MG tablet Take 1 tablet by mouth nightly  Patient taking differently: Take 0.5 mg by mouth 2 times daily as needed. . 1/3/17  Yes OSWLAD Samayoa CNP   calcium-vitamin D (OSCAL-500) 500-200 MG-UNIT per tablet Take 2 tablets by mouth daily   Yes Historical Provider, MD       Social History:   reports that she has been smoking cigarettes. She has a 45.00 pack-year smoking history. She has never used smokeless tobacco. She reports that she does not drink alcohol or use drugs. Family History:  family history includes Cancer in her father and mother; Coronary Art Dis in her father; Diabetes in her mother; High Blood Pressure in her mother; Other in her maternal grandmother. Reviewed. Denies family history of sudden cardiac death, arrhythmia, premature CAD    Review of System:    · General ROS: negative for - chills, fever +fatigue  · Psychological ROS: negative for - anxiety or depression  · Ophthalmic ROS: negative for - eye pain or loss of vision  · ENT ROS: negative for - epistaxis, headaches, nasal discharge, sore throat   · Allergy and Immunology ROS: negative for - hives, nasal congestion   · Hematological and Lymphatic ROS: negative for - bleeding problems, blood clots, bruising or jaundice  · Endocrine ROS: negative for - skin changes, temperature intolerance or unexpected weight changes  · Respiratory ROS: negative for - cough, hemoptysis, pleuritic pain, SOB, sputum changes or wheezing  · Cardiovascular ROS: Per HPI. · Gastrointestinal ROS: negative for - abdominal pain, blood in stools, diarrhea, hematemesis, melena, nausea/vomiting or  swallowing difficulty/pain  · Genito-Urinary ROS: negative for - dysuria or incontinence  · Musculoskeletal ROS: negative for - joint swelling or muscle pain  · Neurological ROS: negative for - confusion, dizziness, gait disturbance, headaches, numbness/tingling, seizures, speech  problems, tremors, visual changes or weakness  · Dermatological ROS: negative for - rash    Physical Examination:  Vitals:    19 0928   BP: 134/76   Pulse: 77   SpO2: 97%       · Constitutional: Oriented. No distress. · Head: Normocephalic and atraumatic. · Mouth/Throat: Oropharynx is clear and moist.   · Eyes: Conjunctivae normal. EOM are normal.   · Neck: Normal range of motion. Neck supple. No rigidity. No JVD present. · Cardiovascular: Rapid rate, irregular rhythm, S1&S2 and intact distal pulses. · Pulmonary/Chest: Bilateral respiratory sounds. No wheezes. No rhonchi. · Abdominal: Soft. Bowel sounds present. No distension, No tenderness. · Musculoskeletal: No tenderness. No edema    · Lymphadenopathy: Has no cervical adenopathy. · Neurological: Alert and oriented. Cranial nerve appears intact, No Gross deficit   · Skin: Skin is warm and dry. No rash noted. · Psychiatric: Has a normal mood, affect and behavior     Labs:  Reviewed. Cr 0.6, K 3.7 (2018- [facility])    EC19  reviewed sinus rhythm nonspecific T wave abnormalities with v-rate of 70 bpm with QRS duration 84 ms. No pathologic Q waves, ventricular pre-excitation, or QT prolongation. 10/1/18: Afib  v-rate 107  10/15/18: Aflutter v-rate 120    Other Non-Invasive Studies:     1. Event monitor:   None    2. Echo: 17 [facility])  EF 55-60%  Grade II DD  Left atrial cavity is severely dilated    3. Stress Test:    None    4. Cath: 17  ANGIOGRAPHIC FINDINGS:  1.   The left main coronary artery comes from the left coronary cusp with  normal AYDIN 3 flow. 2.  The left anterior descending artery comes from the left main coronary  artery. The mid portion has approximately 70% stenosis with a fractional flow  reserve of 0.80. 3. The left circumflex comes from the left main coronary. It is small in  caliber and has mild diffuse disease and AYDIN 3 flow. 4.  The right coronary artery comes from the right coronary cusp. It is a  dominant vessel, giving rise to the posterior descending artery and  posterolateral branch. Mid portion has 50% stenosis. 5.  LV ejection fraction of 60%, LVEDP of 14 mmHg.     In summary, moderate left anterior descending artery stenosis of 70% to 75%  severity with a fractional flow reserve of 0.80. Procedures:  1. Successful DCCV 10/4/18   2. Right Atrial flutter ablation 10/30/18  3.  Atrial fibrillation ablation 1/10/19    Assessment:   Patient Active Problem List    Diagnosis Date Noted    Atrial fibrillation with RVR ([facility] 09/04/2018     Priority: Low    Long term current use of amiodarone 04/20/2018     Priority: Low    CAD (coronary artery disease)      Priority: Low    HLD (hyperlipidemia)      Priority: Low    Essential hypertension      Priority: Low    Paroxysmal atrial fibrillation (HCC) 01/28/2017     Priority: Low    NSTEMI (non-ST elevated myocardial infarction) ([facility] 01/27/2017     Priority: Low    Diabetes mellitus due to underlying condition with mild nonproliferative diabetic retinopathy without macular edema (HCC) 10/12/2015     Priority: Low    Type 2 diabetes mellitus without complication, without long-term current use of insulin (HCC)      Priority: Low    High triglycerides      Priority: Low    Hypertension      Priority: Low    Menopause      Priority: Low    Osteoarthritis      Priority: Low    S/P ablation of atrial fibrillation 03/19/2019    Persistent atrial fibrillation ([facility] 03/19/2019    Atrial fibrillation ([facility] 01/10/2019    S/P ablation of atrial flutter

## 2019-06-12 ENCOUNTER — OFFICE VISIT (OUTPATIENT)
Dept: CARDIOLOGY CLINIC | Age: 69
End: 2019-06-12
Payer: MEDICARE

## 2019-06-12 VITALS
OXYGEN SATURATION: 97 % | HEART RATE: 77 BPM | DIASTOLIC BLOOD PRESSURE: 76 MMHG | BODY MASS INDEX: 27.64 KG/M2 | WEIGHT: 172 LBS | HEIGHT: 66 IN | SYSTOLIC BLOOD PRESSURE: 134 MMHG

## 2019-06-12 DIAGNOSIS — I48.0 PAROXYSMAL ATRIAL FIBRILLATION (HCC): Primary | ICD-10-CM

## 2019-06-12 PROCEDURE — G8598 ASA/ANTIPLAT THER USED: HCPCS | Performed by: INTERNAL MEDICINE

## 2019-06-12 PROCEDURE — 1090F PRES/ABSN URINE INCON ASSESS: CPT | Performed by: INTERNAL MEDICINE

## 2019-06-12 PROCEDURE — G8400 PT W/DXA NO RESULTS DOC: HCPCS | Performed by: INTERNAL MEDICINE

## 2019-06-12 PROCEDURE — G8427 DOCREV CUR MEDS BY ELIG CLIN: HCPCS | Performed by: INTERNAL MEDICINE

## 2019-06-12 PROCEDURE — G8417 CALC BMI ABV UP PARAM F/U: HCPCS | Performed by: INTERNAL MEDICINE

## 2019-06-12 PROCEDURE — 93000 ELECTROCARDIOGRAM COMPLETE: CPT | Performed by: INTERNAL MEDICINE

## 2019-06-12 PROCEDURE — 99214 OFFICE O/P EST MOD 30 MIN: CPT | Performed by: INTERNAL MEDICINE

## 2019-06-12 PROCEDURE — 1123F ACP DISCUSS/DSCN MKR DOCD: CPT | Performed by: INTERNAL MEDICINE

## 2019-06-12 PROCEDURE — 4040F PNEUMOC VAC/ADMIN/RCVD: CPT | Performed by: INTERNAL MEDICINE

## 2019-06-12 PROCEDURE — 3017F COLORECTAL CA SCREEN DOC REV: CPT | Performed by: INTERNAL MEDICINE

## 2019-06-12 PROCEDURE — 4004F PT TOBACCO SCREEN RCVD TLK: CPT | Performed by: INTERNAL MEDICINE

## 2019-06-12 NOTE — PATIENT INSTRUCTIONS
Patient Education        How to Read a Food Label to Limit Sodium: Care Instructions  Your Care Instructions  Sodium causes your body to hold on to extra water. This can raise your blood pressure and force your heart and kidneys to work harder. In very serious cases, this could cause you to be put in the hospital. It might even be life-threatening. By limiting sodium, you will feel better and lower your risk of serious problems. Processed foods, fast food, and restaurant foods are the major sources of dietary sodium. The most common name for sodium is salt. Try to limit how much sodium you eat to less than 2,300 milligrams (mg) a day. If you limit your sodium to 1,500 mg a day, you can lower your blood pressure even more. This limit counts all the salt that you eat in foods you cook or in packaged foods. Keep a list of everything you eat and drink. Follow-up care is a key part of your treatment and safety. Be sure to make and go to all appointments, and call your doctor if you are having problems. It's also a good idea to know your test results and keep a list of the medicines you take. How can you care for yourself at home? Read ingredient lists on food labels  · Read the list of ingredients on food labels to help you find how much sodium is in a food. The label lists the ingredients in a food in descending order (from the most to the least). If salt or sodium is high on the list, there may be a lot of sodium in the food. · Know that sodium has different names. Sodium is also called monosodium glutamate (MSG, common in [facility] food), sodium citrate, sodium alginate, sodium hydroxide, and sodium phosphate. Read Nutrition Facts labels  · On most foods, there is a Nutrition Facts label. This will tell you how much sodium is in one serving of food. Look at both the serving size and the sodium amount. The serving size is located at the top of the label, usually right under the \"Nutrition Facts\" title.  The amount link.  Current as of: November 7, 2018  Content Version: 12.0  © 2460-2418 [employer]wise, [employer]. Care instructions adapted under license by [facility] ([facility]). If you have questions about a medical condition or this instruction, always ask your healthcare professional. [address] any warranty or liability for your use of this information.

## 2019-06-12 NOTE — LETTER
[address]   Electrophysiology Consultation   Date: 6/11/2019  Reason for Consultation: Afib  Consult Requesting Physician: Yang Ellsworth MD  Primary Care Physician: Clifford Harding MD    Chief Complaint:   No chief complaint on file. HPI: Win Mckeon is a 78 y.o. with a PMH significant for NSTEMI (2017), HLD, hypothyroidism, HTN, DM and PAF first diagnosed in 2017 in the setting of acute coronary syndrome who arrived to [facility] 9/4/18 with complaints of palpitations and fatigue. She was found to be back in AFib RVR and her beta blocker therapy was increased for tighter rate control. She was scheduled for a DCCV but spontaneously converted on her own prior to procedure. During office follow up visit on 10/1/19 EKG confirmed she was back in afib/aflutter. She subsequently underwent successful cardioversion on 10/4/19 . She underwent right atrial flutter ablation on 10/30/18 and and atrial fibrillation ablation on 1/10/19. She is typically followed by Dr. Ned Villalobos for her cardiac needs. Interval History: Today patient present to office for follow up s/p right atrial flutter ablation on 10/30/18 and atrial fibrillation ablation 1/10/19. She is compliant with her medications and tolerating them well. She denies chest pain/pressure, tightness, edema, shortness of breath, heart racing, palpitations, lightheadedness, dizziness, syncope, presyncope,  PND or orthopnea. Past Medical History:   Diagnosis Date    Atrial fibrillation ([address]     CAD (coronary artery disease)     NSTEMI 1/201-->Echo LVEF normal, grade II diastolic dysfx, LAE. Cath 1/27/17 70-75% LAD, FFR LAD 0.80, 50% PLD- Med tx.      GERD (gastroesophageal reflux disease)     High triglycerides     HLD (hyperlipidemia)     Hypertension     Menopause     Osteoarthritis     Stress incontinence     Thyroid disease     Type II or unspecified type diabetes mellitus without mention of complication, not stated as uncontrolled         Past Surgical History:   Procedure Laterality Date    APPENDECTOMY  1953    ATRIAL ABLATION SURGERY N/A 10/30/2018    BRITTANY Atrial Flutter Ablation    CHOLECYSTECTOMY  9/2006    COLONOSCOPY      FACIAL COSMETIC SURGERY  1965    TONSILLECTOMY  1952    TUBAL LIGATION         Allergies: Allergies   Allergen Reactions    Influenza Vaccines Hives       Medication:   Prior to Admission medications    Medication Sig Start Date End Date Taking? Authorizing Provider   warfarin (COUMADIN) 5 MG tablet TAKE 1 TABLET BY MOUTH EVERY DAY OR AS DIRECTED BY CLINIC 5/29/19   Keesha Mills MD   metoprolol tartrate (LOPRESSOR) 50 MG tablet Take 2 tablets by mouth 2 times daily 5/9/19   Keesha Mills MD   lisinopril-hydrochlorothiazide (PRINZIDE;ZESTORETIC) 20-25 MG per tablet Take 1 tablet by mouth 2 times daily 5/9/19   Keesha Mills MD   isosorbide mononitrate (IMDUR) 30 MG extended release tablet Take 1 tablet by mouth daily 5/9/19   Keesha Mills MD   atorvastatin (LIPITOR) 40 MG tablet Take 1 tablet by mouth daily 5/9/19   Keesha Mills MD   diltiazem (CARDIZEM CD) 360 MG extended release capsule Take 1 capsule by mouth daily 5/9/19   Keesha Mills MD   diltiazem (CARDIZEM CD) 360 MG extended release capsule Take 1 capsule by mouth daily 4/8/19   Keesha Mills MD   flecainide (TAMBOCOR) 50 MG tablet TAKE 1 TABLET BY MOUTH EVERY 12 HOURS 4/5/19   Sandra Gross MD   magnesium oxide (MAG-OX) 400 (241.3 Mg) MG TABS tablet TAKE ONE TABLET BY MOUTH TWICE A DAY 3/22/19   Sandra Gross MD   potassium chloride (KLOR-CON M) 20 MEQ TBCR extended release tablet Take 1 tablet by mouth 2 times daily (with meals) Take 2 tablets by mouth twice a day for 4 days.  3/22/19   Sandra Gross MD   warfarin (COUMADIN) 2.5 MG tablet Take 2.5 mg by mouth Mon, Tues, Wed, Fri, Sat    Historical Provider, MD   Multiple Vitamins-Minerals (THERAPEUTIC MULTIVITAMIN-MINERALS) tablet Take diarrhea, hematemesis, melena, nausea/vomiting or  swallowing difficulty/pain  · Genito-Urinary ROS: negative for - dysuria or incontinence  · Musculoskeletal ROS: negative for - joint swelling or muscle pain  · Neurological ROS: negative for - confusion, dizziness, gait disturbance, headaches, numbness/tingling, seizures, speech  problems, tremors, visual changes or weakness  · Dermatological ROS: negative for - rash    Physical Examination:  There were no vitals filed for this visit. · Constitutional: Oriented. No distress. · Head: Normocephalic and atraumatic. · Mouth/Throat: Oropharynx is clear and moist.   · Eyes: Conjunctivae normal. EOM are normal.   · Neck: Normal range of motion. Neck supple. No rigidity. No JVD present. · Cardiovascular: Rapid rate, irregular rhythm, S1&S2 and intact distal pulses. · Pulmonary/Chest: Bilateral respiratory sounds. No wheezes. No rhonchi. · Abdominal: Soft. Bowel sounds present. No distension, No tenderness. · Musculoskeletal: No tenderness. No edema    · Lymphadenopathy: Has no cervical adenopathy. · Neurological: Alert and oriented. Cranial nerve appears intact, No Gross deficit   · Skin: Skin is warm and dry. No rash noted. · Psychiatric: Has a normal mood, affect and behavior     Labs:  Reviewed. Cr 0.6, K 3.7 (2018- [facility])    EC19  reviewed sinus rhythm nonspecific T wave abnormalities with v-rate of 70 bpm with QRS duration 84 ms. No pathologic Q waves, ventricular pre-excitation, or QT prolongation. 10/1/18: Afib  v-rate 107  10/15/18: Aflutter v-rate 120    Other Non-Invasive Studies:     1. Event monitor:   None    2. Echo: 17 [facility])  EF 55-60%  Grade II DD  Left atrial cavity is severely dilated    3. Stress Test:    None    4. Cath: 17  ANGIOGRAPHIC FINDINGS:  1. The left main coronary artery comes from the left coronary cusp with  normal AYDIN 3 flow. 2.  The left anterior descending artery comes from the left main coronary  artery. The mid portion has approximately 70% stenosis with a fractional flow  reserve of 0.80. 3. The left circumflex comes from the left main coronary. It is small in  caliber and has mild diffuse disease and AYDIN 3 flow. 4.  The right coronary artery comes from the right coronary cusp. It is a  dominant vessel, giving rise to the posterior descending artery and  posterolateral branch. Mid portion has 50% stenosis. 5.  LV ejection fraction of 60%, LVEDP of 14 mmHg.     In summary, moderate left anterior descending artery stenosis of 70% to 75%  severity with a fractional flow reserve of 0.80. Procedures:  1. Successful DCCV 10/4/18   2. Right Atrial flutter ablation 10/30/18  3. Atrial fibrillation ablation 1/10/19    Assessment:   Patient Active Problem List    Diagnosis Date Noted    S/P ablation of atrial fibrillation 03/19/2019    Persistent atrial fibrillation ([facility] 03/19/2019    [facility] ([facility] 01/10/2019    S/P ablation of atrial flutter 10/30/2018    Right atrial flutter by electrocardiogram ([facility] 10/30/2018    Atrial fibrillation with RVR ([facility] 09/04/2018    Long term current use of amiodarone 04/20/2018    CAD (coronary artery disease)     HLD (hyperlipidemia)     Essential hypertension     Paroxysmal atrial fibrillation ([facility] 01/28/2017    NSTEMI (non-ST elevated myocardial infarction) ([facility] 01/27/2017    Diabetes mellitus due to underlying condition with mild nonproliferative diabetic retinopathy without macular edema ([facility] 10/12/2015    Type 2 diabetes mellitus without complication, without long-term current use of insulin (HCC)     High triglycerides     Hypertension     Menopause     Osteoarthritis         Plan:    Aflutter:  -s/p Right atrial flutter ablation 10/30/18  -EKG today confirms sinus rhythm. Afib:  -Persistent - appears to have been ongoing for the last several weeks. -Symptomatic (fatigue) expect improvement with discontinuation of Flecainide. -S/p Atrial fibrillation ablation 1/10/19  -Continue diltiazem 240 mg daily, Lopressor 100 mg BID for HTN control. -CHADS VASC 5 (HTN, DM, Age 67-73y, female, and MI) - on coumadin. Stressed importance of maintaining INR 2-3.  -Normal LVEF per ECHO 1/2017  -EKG today confirms sinus rhythm   -Stop  Flecainide  in order to assess new baseline cardiac rhythm apart from any anti-arrhythmic medications.  -30 day event monitor to assess for afib burden  -Discussed s/s to monitor for recurrences of atrial fibrillation (fatigue, dyspnea)   -Much time was spent counseling the patient on healthier lifestyle, particularly dramatically decreasing the intake of processed sugar and follow a low sodium diet 2400 mg a day. Thank you for allowing me to participate in the care of Nick Pickering. All questions and concerns were addressed to the patient/family. Alternatives to my treatment were discussed. This note was scribed in the presence of Bessie Hughes MD by Yumiko Padilla RN. The scribe's documentation has been prepared under my direction and personally reviewed by me in its entirety. I confirm that the note above accurately reflects all work, physical examination, the discussion of treatments and procedures, and medical decision making performed by me.     Bessie Hughes MD, MS, [facility]  Cardiac Electrophysiology  The [address] [place] [address]  [phone]

## 2019-06-12 NOTE — LETTER
Multiple Vitamins-Minerals (THERAPEUTIC MULTIVITAMIN-MINERALS) tablet Take 1 tablet by mouth daily   Yes Historical Provider, MD   Timi Peals Oil 300 MG CAPS Take 350 mg by mouth 2 times daily   Yes Historical Provider, MD   metFORMIN (GLUCOPHAGE) 850 MG tablet Take 1 tablet by mouth 3 times daily 1/3/17  Yes OSWALD West CNP   omeprazole (PRILOSEC OTC) 20 MG tablet Take 1 tablet by mouth daily 1/3/17  Yes OSWALD West CNP   LORazepam (ATIVAN) 0.5 MG tablet Take 1 tablet by mouth nightly  Patient taking differently: Take 0.5 mg by mouth 2 times daily as needed. . 1/3/17  Yes OSWALD West CNP   calcium-vitamin D (OSCAL-500) 500-200 MG-UNIT per tablet Take 2 tablets by mouth daily   Yes Historical Provider, MD       Social History:   reports that she has been smoking cigarettes. She has a 45.00 pack-year smoking history. She has never used smokeless tobacco. She reports that she does not drink alcohol or use drugs. Family History:  family history includes Cancer in her father and mother; Coronary Art Dis in her father; Diabetes in her mother; High Blood Pressure in her mother; Other in her maternal grandmother. Reviewed.  Denies family history of sudden cardiac death, arrhythmia, premature CAD    Review of System:    · General ROS: negative for - chills, fever +fatigue  · Psychological ROS: negative for - anxiety or depression  · Ophthalmic ROS: negative for - eye pain or loss of vision  · ENT ROS: negative for - epistaxis, headaches, nasal discharge, sore throat   · Allergy and Immunology ROS: negative for - hives, nasal congestion   · Hematological and Lymphatic ROS: negative for - bleeding problems, blood clots, bruising or jaundice  · Endocrine ROS: negative for - skin changes, temperature intolerance or unexpected weight changes  · Respiratory ROS: negative for - cough, hemoptysis, pleuritic pain, SOB, sputum changes or wheezing · Cardiovascular ROS: Per HPI. · Gastrointestinal ROS: negative for - abdominal pain, blood in stools, diarrhea, hematemesis, melena, nausea/vomiting or  swallowing difficulty/pain  · Genito-Urinary ROS: negative for - dysuria or incontinence  · Musculoskeletal ROS: negative for - joint swelling or muscle pain  · Neurological ROS: negative for - confusion, dizziness, gait disturbance, headaches, numbness/tingling, seizures, speech  problems, tremors, visual changes or weakness  · Dermatological ROS: negative for - rash    Physical Examination:  Vitals:    19 0928   BP: 134/76   Pulse: 77   SpO2: 97%       · Constitutional: Oriented. No distress. · Head: Normocephalic and atraumatic. · Mouth/Throat: Oropharynx is clear and moist.   · Eyes: Conjunctivae normal. EOM are normal.   · Neck: Normal range of motion. Neck supple. No rigidity. No JVD present. · Cardiovascular: Rapid rate, irregular rhythm, S1&S2 and intact distal pulses. · Pulmonary/Chest: Bilateral respiratory sounds. No wheezes. No rhonchi. · Abdominal: Soft. Bowel sounds present. No distension, No tenderness. · Musculoskeletal: No tenderness. No edema    · Lymphadenopathy: Has no cervical adenopathy. · Neurological: Alert and oriented. Cranial nerve appears intact, No Gross deficit   · Skin: Skin is warm and dry. No rash noted. · Psychiatric: Has a normal mood, affect and behavior     Labs:  Reviewed. Cr 0.6, K 3.7 (2018- [facility])    EC19  reviewed sinus rhythm nonspecific T wave abnormalities with v-rate of 70 bpm with QRS duration 84 ms. No pathologic Q waves, ventricular pre-excitation, or QT prolongation. 10/1/18: Afib  v-rate 107  10/15/18: Aflutter v-rate 120    Other Non-Invasive Studies:     1. Event monitor:   None    2. Echo: 17 [facility])  EF 55-60%  Grade II DD  Left atrial cavity is severely dilated    3. Stress Test:    None    4.  Cath: 17  ANGIOGRAPHIC FINDINGS:  Atrial fibrillation ([facility] 01/10/2019    S/P ablation of atrial flutter 10/30/2018    Right atrial flutter by electrocardiogram ([facility] 10/30/2018        Plan:    Aflutter:  -s/p Right atrial flutter ablation 10/30/18  -EKG today confirms sinus rhythm. Afib:  -Persistent - appears to have been ongoing for the last several weeks.  -Symptomatic (fatigue) expect improvement with discontinuation of Flecainide. -S/p Atrial fibrillation ablation 1/10/19  -Continue diltiazem 240 mg daily, Lopressor 100 mg BID for HTN control. -CHADS VASC 5 (HTN, DM, Age 67-73y, female, and MI) - on coumadin. Stressed importance of maintaining INR 2-3.  -Normal LVEF per ECHO 1/2017  -EKG today confirms sinus rhythm   -Stop  Flecainide  in order to assess new baseline cardiac rhythm apart from any anti-arrhythmic medications.  -30 day event monitor to assess for afib burden  -Discussed s/s to monitor for recurrences of atrial fibrillation (fatigue, dyspnea)   -Much time was spent counseling the patient on healthier lifestyle, particularly dramatically decreasing the intake of processed sugar and follow a low sodium diet 2400 mg a day. Thank you for allowing me to participate in the care of Clair Caldera. All questions and concerns were addressed to the patient/family. Alternatives to my treatment were discussed. This note was scribed in the presence of Ruby Pathak MD by Dar Aaron RN. The scribe's documentation has been prepared under my direction and personally reviewed by me in its entirety. I confirm that the note above accurately reflects all work, physical examination, the discussion of treatments and procedures, and medical decision making performed by me.     Ruby Pathak MD, MS, [facility]  Cardiac Electrophysiology  The [address] [place] [address]  [phone]

## 2019-07-15 PROCEDURE — 93228 REMOTE 30 DAY ECG REV/REPORT: CPT | Performed by: INTERNAL MEDICINE

## 2019-07-17 ENCOUNTER — TELEPHONE (OUTPATIENT)
Dept: CARDIOLOGY CLINIC | Age: 69
End: 2019-07-17

## 2019-07-17 DIAGNOSIS — I48.0 PAROXYSMAL ATRIAL FIBRILLATION (HCC): ICD-10-CM

## 2019-08-08 NOTE — PROGRESS NOTES
CHOLECYSTECTOMY  9/2006    COLONOSCOPY      FACIAL COSMETIC SURGERY  1965    TONSILLECTOMY  1952    TUBAL LIGATION         Allergies: Allergies   Allergen Reactions    Influenza Vaccines Hives       Medication:   Prior to Admission medications    Medication Sig Start Date End Date Taking? Authorizing Provider   warfarin (COUMADIN) 5 MG tablet TAKE 1 TABLET BY MOUTH EVERY DAY OR AS DIRECTED BY CLINIC 5/29/19   Myrna Gaytan MD   metoprolol tartrate (LOPRESSOR) 50 MG tablet Take 2 tablets by mouth 2 times daily 5/9/19   Myrna Gaytan MD   lisinopril-hydrochlorothiazide (PRINZIDE;ZESTORETIC) 20-25 MG per tablet Take 1 tablet by mouth 2 times daily 5/9/19   Myrna Gaytan MD   isosorbide mononitrate (IMDUR) 30 MG extended release tablet Take 1 tablet by mouth daily 5/9/19   Myrna Gaytan MD   atorvastatin (LIPITOR) 40 MG tablet Take 1 tablet by mouth daily 5/9/19   Myrna Gaytan MD   diltiazem (CARDIZEM CD) 360 MG extended release capsule Take 1 capsule by mouth daily 5/9/19   Myrna Gaytan MD   diltiazem (CARDIZEM CD) 360 MG extended release capsule Take 1 capsule by mouth daily 4/8/19   Myrna Gaytan MD   magnesium oxide (MAG-OX) 400 (241.3 Mg) MG TABS tablet TAKE ONE TABLET BY MOUTH TWICE A DAY 3/22/19   Latoya Joseph MD   potassium chloride (KLOR-CON M) 20 MEQ TBCR extended release tablet Take 1 tablet by mouth 2 times daily (with meals) Take 2 tablets by mouth twice a day for 4 days.  3/22/19   Latoya Joseph MD   warfarin (COUMADIN) 2.5 MG tablet Take 2.5 mg by mouth Mon, Tues, Wed, Fri, Sat    Historical Provider, MD   Multiple Vitamins-Minerals (THERAPEUTIC MULTIVITAMIN-MINERALS) tablet Take 1 tablet by mouth daily    Historical Provider, MD Anders Kimberlee Oil 300 MG CAPS Take 350 mg by mouth 2 times daily    Historical Provider, MD   metFORMIN (GLUCOPHAGE) 850 MG tablet Take 1 tablet by mouth 3 times daily 1/3/17   Darby Iron, APRN - CNP   omeprazole (PRILOSEC OTC) 20 MG tablet Take 1 a  dominant vessel, giving rise to the posterior descending artery and  posterolateral branch. Mid portion has 50% stenosis. 5.  LV ejection fraction of 60%, LVEDP of 14 mmHg.     In summary, moderate left anterior descending artery stenosis of 70% to 75%  severity with a fractional flow reserve of 0.80. Procedures:  1. Successful DCCV 10/4/18   2. Right Atrial flutter ablation 10/30/18  3. Atrial fibrillation ablation 1/10/19    Assessment:   Patient Active Problem List    Diagnosis Date Noted    S/P ablation of atrial fibrillation 03/19/2019    Persistent atrial fibrillation ([facility] 03/19/2019    Atrial fibrillation ([facility] 01/10/2019    S/P ablation of atrial flutter 10/30/2018    [facility]2018    [facility] 09[facility]04/2018    Long term current use of amiodarone 04/20/2018    CAD (coronary artery disease)     HLD (hyperlipidemia)     Essential hypertension     Paroxysmal atrial fibrillation ([facility] 01/28/2017    NSTEMI (non-ST elevated myocardial infarction) ([facility] 01/27/2017    Diabetes mellitus due to underlying condition with mild nonproliferative diabetic retinopathy without macular edema ([facility] 10/12/2015    Type 2 diabetes mellitus without complication, without long-term current use of insulin (HCC)     High triglycerides     Hypertension     Menopause     Osteoarthritis         Plan:    Aflutter:  -s/p Right atrial flutter ablation 10/30/18  -EKG today confirms sinus rhythm. Afib:  -Persistent - appears to have been ongoing for the last several weeks.  -Symptomatic (fatigue) expect improvement with discontinuation of Flecainide. -S/p Atrial fibrillation ablation 1/10/19  -Continue diltiazem 240 mg daily, Lopressor 100 mg BID for HTN control. -CHADS VASC 5 (HTN, DM, Age 67-73y, female, and MI) - on coumadin.  Stressed importance of maintaining INR 2-3.  -Normal LVEF per ECHO 1/2017  -EKG today confirms sinus rhythm -Stop  Flecainide  in order to assess new baseline cardiac rhythm apart from any anti-arrhythmic medications.  -30 day event monitor to assess for afib burden  -Discussed s/s to monitor for recurrences of atrial fibrillation (fatigue, dyspnea)       Thank you for allowing me to participate in the care of Agatha Ta. All questions and concerns were addressed to the patient/family. Alternatives to my treatment were discussed.        Latoya Joseph MD, MS, [address] [facility]  Cardiac Electrophysiology  The [address] [place] [address]  [phone]

## 2019-08-12 ENCOUNTER — OFFICE VISIT (OUTPATIENT)
Dept: CARDIOLOGY CLINIC | Age: 69
End: 2019-08-12
Payer: MEDICARE

## 2019-08-12 VITALS
WEIGHT: 174 LBS | BODY MASS INDEX: 27.97 KG/M2 | DIASTOLIC BLOOD PRESSURE: 70 MMHG | SYSTOLIC BLOOD PRESSURE: 130 MMHG | OXYGEN SATURATION: 96 % | HEART RATE: 85 BPM | HEIGHT: 66 IN

## 2019-08-12 DIAGNOSIS — I48.0 PAROXYSMAL ATRIAL FIBRILLATION (HCC): Primary | ICD-10-CM

## 2019-08-12 PROCEDURE — 99214 OFFICE O/P EST MOD 30 MIN: CPT | Performed by: INTERNAL MEDICINE

## 2019-08-12 PROCEDURE — G8427 DOCREV CUR MEDS BY ELIG CLIN: HCPCS | Performed by: INTERNAL MEDICINE

## 2019-08-12 PROCEDURE — 93000 ELECTROCARDIOGRAM COMPLETE: CPT | Performed by: INTERNAL MEDICINE

## 2019-08-12 PROCEDURE — G8417 CALC BMI ABV UP PARAM F/U: HCPCS | Performed by: INTERNAL MEDICINE

## 2019-08-12 PROCEDURE — 1090F PRES/ABSN URINE INCON ASSESS: CPT | Performed by: INTERNAL MEDICINE

## 2019-08-12 PROCEDURE — 1123F ACP DISCUSS/DSCN MKR DOCD: CPT | Performed by: INTERNAL MEDICINE

## 2019-08-12 PROCEDURE — G8598 ASA/ANTIPLAT THER USED: HCPCS | Performed by: INTERNAL MEDICINE

## 2019-08-12 PROCEDURE — 4040F PNEUMOC VAC/ADMIN/RCVD: CPT | Performed by: INTERNAL MEDICINE

## 2019-08-12 PROCEDURE — G8400 PT W/DXA NO RESULTS DOC: HCPCS | Performed by: INTERNAL MEDICINE

## 2019-08-12 PROCEDURE — 3017F COLORECTAL CA SCREEN DOC REV: CPT | Performed by: INTERNAL MEDICINE

## 2019-08-12 PROCEDURE — 4004F PT TOBACCO SCREEN RCVD TLK: CPT | Performed by: INTERNAL MEDICINE

## 2019-08-12 NOTE — PROGRESS NOTES
MULTIVITAMIN-MINERALS) tablet Take 1 tablet by mouth daily   Yes Historical Provider, MD Ruelasmychal Kimberlee Oil 300 MG CAPS Take 350 mg by mouth 2 times daily   Yes Historical Provider, MD   metFORMIN (GLUCOPHAGE) 850 MG tablet Take 1 tablet by mouth 3 times daily 1/3/17  Yes Darby Iron, APRN - CNP   omeprazole (PRILOSEC OTC) 20 MG tablet Take 1 tablet by mouth daily 1/3/17  Yes Darby Iron, APRN - CNP   LORazepam (ATIVAN) 0.5 MG tablet Take 1 tablet by mouth nightly  Patient taking differently: Take 0.5 mg by mouth 2 times daily as needed. . 1/3/17  Yes Darby Iron, APRN - CNP   calcium-vitamin D (OSCAL-500) 500-200 MG-UNIT per tablet Take 2 tablets by mouth daily   Yes Historical Provider, MD       Social History:   reports that she has been smoking cigarettes. She has a 45.00 pack-year smoking history. She has never used smokeless tobacco. She reports that she does not drink alcohol or use drugs. Family History:  family history includes Cancer in her father and mother; Coronary Art Dis in her father; Diabetes in her mother; High Blood Pressure in her mother; Other in her maternal grandmother. Reviewed. Denies family history of sudden cardiac death, arrhythmia, premature CAD    Review of System:    · General ROS: negative for - chills, fever +fatigue  · Psychological ROS: negative for - anxiety or depression  · Ophthalmic ROS: negative for - eye pain or loss of vision  · ENT ROS: negative for - epistaxis, headaches, nasal discharge, sore throat   · Allergy and Immunology ROS: negative for - hives, nasal congestion   · Hematological and Lymphatic ROS: negative for - bleeding problems, blood clots, bruising or jaundice  · Endocrine ROS: negative for - skin changes, temperature intolerance or unexpected weight changes  · Respiratory ROS: negative for - cough, hemoptysis, pleuritic pain, SOB, sputum changes or wheezing  · Cardiovascular ROS: Per HPI.    · Gastrointestinal ROS: negative coronary cusp with  normal AYDIN 3 flow. 2.  The left anterior descending artery comes from the left main coronary  artery. The mid portion has approximately 70% stenosis with a fractional flow  reserve of 0.80. 3. The left circumflex comes from the left main coronary. It is small in  caliber and has mild diffuse disease and AYDIN 3 flow. 4.  The right coronary artery comes from the right coronary cusp. It is a  dominant vessel, giving rise to the posterior descending artery and  posterolateral branch. Mid portion has 50% stenosis. 5.  LV ejection fraction of 60%, LVEDP of 14 mmHg.     In summary, moderate left anterior descending artery stenosis of 70% to 75%  severity with a fractional flow reserve of 0.80. Procedures:  1. Successful DCCV 10/4/18   2. Right Atrial flutter ablation 10/30/18  3. Atrial fibrillation ablation 1/10/19  4. Atrial fibrillation ablation 3/19/19    Assessment:   Patient Active Problem List    Diagnosis Date Noted    S/P ablation of atrial fibrillation 03/19/2019    Persistent atrial fibrillation ([facility] 03/19/2019    [facility]    S/P ablation of atrial flutter 10/30/2018    Right atrial flutter [facility] electrocardiogram [facility] 10/30/2018    Atrial fibrillation with RVR ([facility] 09/04/2018    Long term current use of amiodarone 04/20/2018    CAD (coronary artery disease)     HLD (hyperlipidemia)     Essential hypertension     Paroxysmal atrial fibrillation ([address] 01/28/2017    NSTEMI (non-ST elevated myocardial infarction) [facility] 01/27/2017    Diabetes mellitus due 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 Afib:  S/p Atrial fibrillation 3/2019  -EKG confirms normal sinus rhythm today.  -I have reviewed the 30 day event monitor with her today.  -I have discussed with her the continued need for Coumadin.   -No need for Amiodarone for atrial fibrillation management; we would want to see any cardiac dysrhythmia if it were to manifest, as opposed to being masked by any anti-arrhythmic medication. She is s/p atrial fibrilation ablation, and especially so would want to make sure that she remains in normal rhythm apart from any anti-arrhythmic medication.   -Will have Dr Justine Snyder to consider restarting Procardia instead of Cardizem and Metoprolol, since she complains of extreme amount of fatigue with this combination. She stated that her lisinopril and procardia combination was very effective in controlling her BP without the side effects of fatigue that she is currently experiencing.   -I have asked her to watch for symptoms of atrial fib and to get an EKG if she appears to be in atrial fibrillation. Follow up in 1 year. Continue to follow with Dr Justine Snyder as scheduled. Thank you for allowing me to participate in the care of José Luis Mejia. All questions and concerns were addressed to the patient/family. Alternatives to my treatment were discussed. This note was scribed in the presence of Dr Audra Morrison, by Lucy Krause RN    The scribe's documentation has been prepared under my direction and personally reviewed by me in its entirety. I confirm that the note above accurately reflects all work, physical examination, the discussion of treatments and procedures, and medical decision making performed by me.     Lorraine Cantor MD, MS, [facility]  Cardiac Electrophysiology  The [address] [place] [address]  [phone]

## 2019-08-12 NOTE — PATIENT INSTRUCTIONS
Patient Education        Deciding Between Electrical Cardioversion and Rate Control Medicines for Atrial Fibrillation  How can you decide between electrical cardioversion and rate control medicines for atrial fibrillation? What is atrial fibrillation? Atrial fibrillation (say \"AY-tree-emperatriz [id number]-FAY-shun\") is a kind of uneven heartbeat. It can make you feel lightheaded and dizzy. You may feel weak. It also can make you more likely to have a stroke. Electrical cardioversion can return your heart to a normal rhythm. First you'll get medicines to make you sleepy and control pain. Then your doctor will use patches to send an electric current to your heart. This resets the rhythm of your heart. Not everyone with atrial fibrillation needs this treatment. For some people, taking medicines may be better. Most people can live with an uneven heartbeat. It just has to be kept under control so the heart does not beat too fast.  Use this information to help you and your doctor decide which treatment to choose for atrial fibrillation. What are garces points about this decision? · Electrical cardioversion can return your heart to a normal rhythm. But the problem can come back. The longer you have had atrial fibrillation, the more likely it is to come back after this treatment. · Cardioversion may not work as well when an uneven heartbeat is caused by another heart disease, such as heart failure. · If your symptoms bother you a lot, you may want to try cardioversion. But even if it works, you may still need to take blood thinners to prevent a stroke. · If you don't have symptoms, or if they don't bother you much, you can try medicines to slow your heart rate. And you can take blood thinners to prevent a stroke. · Cardioversion does have risks, such as stroke. Discuss the risks with your doctor. Make sure you understand them. · You may have more than one heart problem.  Cardioversion doesn't work as well if you have more than one heart problem. Why might you choose electrical cardioversion? · It restores the normal heart rhythm for most people. · The idea of having an electric shock does not bother you. · Your symptoms bother you a lot. · You have had atrial fibrillation just one time. · You do not have other heart problems. · You may not have to take as many medicines. Or you may not need to take them as long. Why might you choose rate-control medicines? · These medicines keep many people from having symptoms. · You prefer to take medicines rather than have an electric shock. · Your symptoms don't bother you much. · If these medicines don't work, you can still try electrical cardioversion. Your decision  Thinking about the facts and your feelings can help you make a decision that is right for you. Be sure you understand the benefits and risks of your options. And think about what else you need to do before you make the decision. Where can you learn more? Go to https://[employer]. org and sign in to your [employer] account. Enter Q332 in the [employer] box to learn more about \"Deciding Between Electrical Cardioversion and Rate Control Medicines for Atrial Fibrillation. \"     If you do not have an account, please click on the \"Sign Up Now\" link. Current as of: July 22, 2018  Content Version: 12.1  © 0423-4786 [employer], [employer]. Care instructions adapted under license by [facility] ([facility]). If you have questions about a medical condition or this instruction, always ask your healthcare professional. [place] [address] any warranty or liability for your use of this information. Patient Education        Atrial Fibrillation: Care Instructions  Your Care Instructions    Atrial fibrillation is an irregular and often fast heartbeat. Treating this condition is important for several reasons.  It can cause blood clots, which can travel from your heart to your brain and

## 2019-08-12 NOTE — LETTER
· Neurological ROS: negative for - confusion, dizziness, gait disturbance, headaches, numbness/tingling, seizures, speech  problems, tremors, visual changes or weakness  · Dermatological ROS: negative for - rash    Physical Examination:  There were no vitals filed for this visit. · Constitutional: Oriented. No distress. · Head: Normocephalic and atraumatic. · Mouth/Throat: Oropharynx is clear and moist.   · Eyes: Conjunctivae normal. EOM are normal.   · Neck: Normal range of motion. Neck supple. No rigidity. No JVD present. · Cardiovascular: Rapid rate, irregular rhythm, S1&S2 and intact distal pulses. · Pulmonary/Chest: Bilateral respiratory sounds. No wheezes. No rhonchi. · Abdominal: Soft. Bowel sounds present. No distension, No tenderness. · Musculoskeletal: No tenderness. No edema    · Lymphadenopathy: Has no cervical adenopathy. · Neurological: Alert and oriented. Cranial nerve appears intact, No Gross deficit   · Skin: Skin is warm and dry. No rash noted. · Psychiatric: Has a normal mood, affect and behavior     Labs:  Reviewed. Cr 0.6, K 3.7 (2018- [facility])    EC19  reviewed sinus rhythm nonspecific T wave abnormalities with v-rate of 70 bpm with QRS duration 84 ms. No pathologic Q waves, ventricular pre-excitation, or QT prolongation. 10/1/18: Afib  v-rate 107  10/15/18: Aflutter v-rate 120    Other Non-Invasive Studies:     1. Event monitor:   None    2. Echo: 17 [facility])  EF 55-60%  Grade II DD  Left atrial cavity is severely dilated    3. Stress Test:    None    4. Cath: 17  ANGIOGRAPHIC FINDINGS:  1. The left main coronary artery comes from the left coronary cusp with  normal AYDIN 3 flow. 2.  The left anterior descending artery comes from the left main coronary  artery. The mid portion has approximately 70% stenosis with a fractional flow  reserve of 0.80. 3. The left circumflex comes from the left main coronary.   It is small in caliber and has mild diffuse disease and AYDIN 3 flow. 4.  The right coronary artery comes from the right coronary cusp. It is a  dominant vessel, giving rise to the posterior descending artery and  posterolateral branch. Mid portion has 50% stenosis. 5.  LV ejection fraction of 60%, LVEDP of 14 mmHg.     In summary, moderate left anterior descending artery stenosis of 70% to 75%  severity with a fractional flow reserve of 0.80. Procedures:  1. Successful DCCV 10/4/18   2. Right Atrial flutter ablation 10/30/18  3. Atrial fibrillation ablation 1/10/19    Assessment:   Patient Active Problem List    Diagnosis Date Noted    S/P ablation of atrial fibrillation 03/19/2019    Persistent atrial [facility] 03/19/2019    [facility]10[facility]    S/P ablation of atrial flutter 10/30/2018    Right atrial flutter by electrocardiogram ([facility] 10/30/2018    [facility] 09/04/2018    Long term current use of amiodarone 04/20/2018    CAD (coronary artery disease)     HLD (hyperlipidemia)     Essential hypertension     Paroxysmal atrial fibrillation ([facility] 01/28/2017    NSTEMI (non-ST elevated myocardial infarction) ([facility] 01/27/2017    Diabetes mellitus due to underlying 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 Afib:  -Persistent - appears to have been ongoing for the last several weeks.  -Symptomatic (fatigue) expect improvement with discontinuation of Flecainide. -S/p Atrial fibrillation ablation 1/10/19  -Continue diltiazem 240 mg daily, Lopressor 100 mg BID for HTN control.

## 2019-09-04 ENCOUNTER — TELEPHONE (OUTPATIENT)
Dept: CARDIOLOGY CLINIC | Age: 69
End: 2019-09-04

## 2019-09-04 NOTE — TELEPHONE ENCOUNTER
Patient states she seen Dr. Jd Valdez and he wanted her to consult with Dr. Welsh about her having chest pain, sob and dizziness. Dr. Rene Lee thinks she may need her medications adjusted. She has alana having symptoms for about 2 weeks now.  Please advise the patient at [phone]

## 2019-09-05 ENCOUNTER — OFFICE VISIT (OUTPATIENT)
Dept: CARDIOLOGY CLINIC | Age: 69
End: 2019-09-05
Payer: MEDICARE

## 2019-09-05 VITALS
BODY MASS INDEX: 28.7 KG/M2 | OXYGEN SATURATION: 97 % | SYSTOLIC BLOOD PRESSURE: 135 MMHG | HEART RATE: 82 BPM | WEIGHT: 178.6 LBS | HEIGHT: 66 IN | DIASTOLIC BLOOD PRESSURE: 70 MMHG

## 2019-09-05 DIAGNOSIS — I10 ESSENTIAL HYPERTENSION: ICD-10-CM

## 2019-09-05 DIAGNOSIS — Z98.890 S/P ABLATION OF ATRIAL FIBRILLATION: ICD-10-CM

## 2019-09-05 DIAGNOSIS — R06.02 SHORTNESS OF BREATH: ICD-10-CM

## 2019-09-05 DIAGNOSIS — Z86.79 S/P ABLATION OF ATRIAL FIBRILLATION: ICD-10-CM

## 2019-09-05 DIAGNOSIS — E78.2 MIXED HYPERLIPIDEMIA: ICD-10-CM

## 2019-09-05 DIAGNOSIS — R53.83 FATIGUE, UNSPECIFIED TYPE: ICD-10-CM

## 2019-09-05 DIAGNOSIS — I25.118 CORONARY ARTERY DISEASE OF NATIVE ARTERY OF NATIVE HEART WITH STABLE ANGINA PECTORIS (HCC): Primary | ICD-10-CM

## 2019-09-05 PROCEDURE — G8417 CALC BMI ABV UP PARAM F/U: HCPCS | Performed by: INTERNAL MEDICINE

## 2019-09-05 PROCEDURE — G8598 ASA/ANTIPLAT THER USED: HCPCS | Performed by: INTERNAL MEDICINE

## 2019-09-05 PROCEDURE — 99215 OFFICE O/P EST HI 40 MIN: CPT | Performed by: INTERNAL MEDICINE

## 2019-09-05 PROCEDURE — 1123F ACP DISCUSS/DSCN MKR DOCD: CPT | Performed by: INTERNAL MEDICINE

## 2019-09-05 PROCEDURE — 4040F PNEUMOC VAC/ADMIN/RCVD: CPT | Performed by: INTERNAL MEDICINE

## 2019-09-05 PROCEDURE — G8400 PT W/DXA NO RESULTS DOC: HCPCS | Performed by: INTERNAL MEDICINE

## 2019-09-05 PROCEDURE — 4004F PT TOBACCO SCREEN RCVD TLK: CPT | Performed by: INTERNAL MEDICINE

## 2019-09-05 PROCEDURE — G8427 DOCREV CUR MEDS BY ELIG CLIN: HCPCS | Performed by: INTERNAL MEDICINE

## 2019-09-05 PROCEDURE — 1090F PRES/ABSN URINE INCON ASSESS: CPT | Performed by: INTERNAL MEDICINE

## 2019-09-05 PROCEDURE — 3017F COLORECTAL CA SCREEN DOC REV: CPT | Performed by: INTERNAL MEDICINE

## 2019-09-05 ASSESSMENT — ENCOUNTER SYMPTOMS
COUGH: 0
EYE REDNESS: 0
EYE PAIN: 0
COLOR CHANGE: 0
ABDOMINAL PAIN: 0
CHEST TIGHTNESS: 0
WHEEZING: 0
SHORTNESS OF BREATH: 1
BLOOD IN STOOL: 0

## 2019-09-05 NOTE — PROGRESS NOTES
stress test at [facility] to evaluate worsening dyspnea/chest pain and exclude ischemia. Will check TSH and CBC if not recently done. Continue BB, warfarin, Mg supplement, CCB, nitrates, statin and ACE. In view of fatigue, will reduce lopressor to 25mg bid. Risk factor modification also discussed. Follow up in 1 month. This note was scribed in the presence of the physician by Aleks Witt RN. The scribes documentation has been prepared under my direction and personally reviewed by me in its entirety. I confirm that the note above accurately reflects all work, treatment, procedures, and medical decision making performed by me.

## 2019-09-05 NOTE — PATIENT INSTRUCTIONS
Patient Education     Reduce lopressor to 25mg bid. Atrial Fibrillation: Care Instructions  Your Care Instructions    Atrial fibrillation is an irregular and often fast heartbeat. Treating this condition is important for several reasons. It can cause blood clots, which can travel from your heart to your brain and cause a stroke. If you have a fast heartbeat, you may feel lightheaded, dizzy, and weak. An irregular heartbeat can also increase your risk for heart failure. Atrial fibrillation is often the result of another heart condition, such as high blood pressure or coronary artery disease. Making changes to improve your heart condition will help you stay healthy and active. Follow-up care is a key part of your treatment and safety. Be sure to make and go to all appointments, and call your doctor if you are having problems. It's also a good idea to know your test results and keep a list of the medicines you take. How can you care for yourself at home? Medicines    · Take your medicines exactly as prescribed. Call your doctor if you think you are having a problem with your medicine. You will get more details on the specific medicines your doctor prescribes.     · If your doctor has given you a blood thinner to prevent a stroke, be sure you get instructions about how to take your medicine safely. Blood thinners can cause serious bleeding problems.     · Do not take any vitamins, over-the-counter drugs, or herbal products without talking to your doctor first.    Lifestyle changes    · Do not smoke. Smoking can increase your chance of a stroke and heart attack. If you need help quitting, talk to your doctor about stop-smoking programs and medicines. These can increase your chances of quitting for good.     · Eat a heart-healthy diet.     · Stay at a healthy weight. Lose weight if you need to.     · Limit alcohol to 2 drinks a day for men and 1 drink a day for women. Too much alcohol can cause health problems.

## 2019-09-06 ENCOUNTER — TELEPHONE (OUTPATIENT)
Dept: CARDIOLOGY CLINIC | Age: 69
End: 2019-09-06

## 2019-09-23 ENCOUNTER — TELEPHONE (OUTPATIENT)
Dept: CARDIOLOGY CLINIC | Age: 69
End: 2019-09-23

## 2019-09-24 ENCOUNTER — HOSPITAL ENCOUNTER (OUTPATIENT)
Dept: NON INVASIVE DIAGNOSTICS | Age: 69
Discharge: HOME | End: 2019-09-24
Payer: MEDICARE

## 2019-09-24 DIAGNOSIS — I25.118 CORONARY ARTERY DISEASE OF NATIVE ARTERY OF NATIVE HEART WITH STABLE ANGINA PECTORIS (HCC): ICD-10-CM

## 2019-09-24 LAB
LV EF: 68 %
LVEF MODALITY: NORMAL

## 2019-09-24 PROCEDURE — 3430000000 HC RX DIAGNOSTIC RADIOPHARMACEUTICAL: Performed by: INTERNAL MEDICINE

## 2019-09-24 PROCEDURE — 3430000000 HC RX DIAGNOSTIC RADIOPHARMACEUTICAL: Performed by: FAMILY MEDICINE

## 2019-09-24 PROCEDURE — A9502 TC99M TETROFOSMIN: HCPCS | Performed by: INTERNAL MEDICINE

## 2019-09-24 PROCEDURE — A9502 TC99M TETROFOSMIN: HCPCS | Performed by: FAMILY MEDICINE

## 2019-09-24 PROCEDURE — 78452 HT MUSCLE IMAGE SPECT MULT: CPT

## 2019-09-24 PROCEDURE — 93017 CV STRESS TEST TRACING ONLY: CPT

## 2019-09-24 PROCEDURE — 6360000002 HC RX W HCPCS: Performed by: INTERNAL MEDICINE

## 2019-09-24 RX ADMIN — TETROFOSMIN 10 MILLICURIE: 1.38 INJECTION, POWDER, LYOPHILIZED, FOR SOLUTION INTRAVENOUS at 10:39

## 2019-09-24 RX ADMIN — REGADENOSON 0.4 MG: 0.08 INJECTION, SOLUTION INTRAVENOUS at 11:41

## 2019-09-24 RX ADMIN — TETROFOSMIN 30 MILLICURIE: 1.38 INJECTION, POWDER, LYOPHILIZED, FOR SOLUTION INTRAVENOUS at 11:47

## 2019-10-17 ENCOUNTER — OFFICE VISIT (OUTPATIENT)
Dept: CARDIOLOGY CLINIC | Age: 69
End: 2019-10-17
Payer: MEDICARE

## 2019-10-17 VITALS
DIASTOLIC BLOOD PRESSURE: 84 MMHG | OXYGEN SATURATION: 97 % | HEIGHT: 66 IN | WEIGHT: 180 LBS | BODY MASS INDEX: 28.93 KG/M2 | HEART RATE: 91 BPM | SYSTOLIC BLOOD PRESSURE: 130 MMHG

## 2019-10-17 DIAGNOSIS — I25.118 CORONARY ARTERY DISEASE OF NATIVE ARTERY OF NATIVE HEART WITH STABLE ANGINA PECTORIS (HCC): Primary | ICD-10-CM

## 2019-10-17 DIAGNOSIS — E78.2 MIXED HYPERLIPIDEMIA: ICD-10-CM

## 2019-10-17 DIAGNOSIS — I48.0 PAROXYSMAL ATRIAL FIBRILLATION (HCC): ICD-10-CM

## 2019-10-17 PROCEDURE — 99214 OFFICE O/P EST MOD 30 MIN: CPT | Performed by: INTERNAL MEDICINE

## 2019-10-17 PROCEDURE — G8598 ASA/ANTIPLAT THER USED: HCPCS | Performed by: INTERNAL MEDICINE

## 2019-10-17 PROCEDURE — 4040F PNEUMOC VAC/ADMIN/RCVD: CPT | Performed by: INTERNAL MEDICINE

## 2019-10-17 PROCEDURE — G8417 CALC BMI ABV UP PARAM F/U: HCPCS | Performed by: INTERNAL MEDICINE

## 2019-10-17 PROCEDURE — G8427 DOCREV CUR MEDS BY ELIG CLIN: HCPCS | Performed by: INTERNAL MEDICINE

## 2019-10-17 PROCEDURE — G8484 FLU IMMUNIZE NO ADMIN: HCPCS | Performed by: INTERNAL MEDICINE

## 2019-10-17 PROCEDURE — 4004F PT TOBACCO SCREEN RCVD TLK: CPT | Performed by: INTERNAL MEDICINE

## 2019-10-17 PROCEDURE — 1090F PRES/ABSN URINE INCON ASSESS: CPT | Performed by: INTERNAL MEDICINE

## 2019-10-17 PROCEDURE — 1123F ACP DISCUSS/DSCN MKR DOCD: CPT | Performed by: INTERNAL MEDICINE

## 2019-10-17 PROCEDURE — G8400 PT W/DXA NO RESULTS DOC: HCPCS | Performed by: INTERNAL MEDICINE

## 2019-10-17 PROCEDURE — 3017F COLORECTAL CA SCREEN DOC REV: CPT | Performed by: INTERNAL MEDICINE

## 2019-10-17 ASSESSMENT — ENCOUNTER SYMPTOMS
ABDOMINAL PAIN: 0
BLOOD IN STOOL: 0
EYE REDNESS: 0
EYE PAIN: 0
WHEEZING: 0
CHEST TIGHTNESS: 0
COUGH: 0
COLOR CHANGE: 0
SHORTNESS OF BREATH: 0

## 2019-11-25 RX ORDER — ATORVASTATIN CALCIUM 40 MG/1
TABLET, FILM COATED ORAL
Qty: 90 TABLET | Refills: 3 | Status: SHIPPED | OUTPATIENT
Start: 2019-11-25 | End: 2020-12-16 | Stop reason: SDUPTHER

## 2019-12-06 ENCOUNTER — NURSE ONLY (OUTPATIENT)
Dept: CARDIOLOGY CLINIC | Age: 69
End: 2019-12-06
Payer: MEDICARE

## 2019-12-06 ENCOUNTER — TELEPHONE (OUTPATIENT)
Dept: CARDIOLOGY CLINIC | Age: 69
End: 2019-12-06

## 2019-12-06 VITALS — HEART RATE: 159 BPM

## 2019-12-06 DIAGNOSIS — I48.0 PAROXYSMAL ATRIAL FIBRILLATION (HCC): Primary | ICD-10-CM

## 2019-12-06 DIAGNOSIS — I48.92 ATRIAL FLUTTER, UNSPECIFIED TYPE (HCC): Primary | ICD-10-CM

## 2019-12-06 PROCEDURE — 93000 ELECTROCARDIOGRAM COMPLETE: CPT | Performed by: INTERNAL MEDICINE

## 2019-12-06 RX ORDER — DIGOXIN 125 MCG
125 TABLET ORAL DAILY
Qty: 30 TABLET | Refills: 3 | Status: SHIPPED | OUTPATIENT
Start: 2019-12-06 | End: 2020-01-29 | Stop reason: ALTCHOICE

## 2019-12-06 RX ORDER — METOPROLOL SUCCINATE 100 MG/1
100 TABLET, EXTENDED RELEASE ORAL DAILY
Qty: 30 TABLET | Refills: 1 | Status: SHIPPED | OUTPATIENT
Start: 2019-12-06 | End: 2020-01-22

## 2019-12-09 RX ORDER — MAGNESIUM OXIDE 400 MG/1
TABLET ORAL
Qty: 60 TABLET | Refills: 5 | Status: SHIPPED | OUTPATIENT
Start: 2019-12-09 | End: 2020-08-20 | Stop reason: SDUPTHER

## 2019-12-10 ENCOUNTER — NURSE ONLY (OUTPATIENT)
Dept: CARDIOLOGY CLINIC | Age: 69
End: 2019-12-10
Payer: MEDICARE

## 2019-12-10 DIAGNOSIS — I48.92 ATRIAL FLUTTER, UNSPECIFIED TYPE (HCC): ICD-10-CM

## 2019-12-10 DIAGNOSIS — I48.0 PAROXYSMAL ATRIAL FIBRILLATION (HCC): Primary | ICD-10-CM

## 2019-12-10 LAB — DIGOXIN LEVEL: 0.4 NG/ML (ref 0.8–2)

## 2019-12-10 PROCEDURE — 93000 ELECTROCARDIOGRAM COMPLETE: CPT | Performed by: INTERNAL MEDICINE

## 2019-12-30 RX ORDER — DILTIAZEM HYDROCHLORIDE 360 MG/1
CAPSULE, EXTENDED RELEASE ORAL
Qty: 90 CAPSULE | Refills: 3 | Status: SHIPPED | OUTPATIENT
Start: 2019-12-30 | End: 2020-12-16 | Stop reason: SDUPTHER

## 2020-01-02 RX ORDER — LISINOPRIL AND HYDROCHLOROTHIAZIDE 25; 20 MG/1; MG/1
TABLET ORAL
Qty: 180 TABLET | Refills: 3 | Status: SHIPPED | OUTPATIENT
Start: 2020-01-02

## 2020-01-07 ENCOUNTER — TELEPHONE (OUTPATIENT)
Dept: CARDIOLOGY CLINIC | Age: 70
End: 2020-01-07

## 2020-01-07 NOTE — TELEPHONE ENCOUNTER
Patient says she has been in afib for 3 days. Her heart rate today was 174. Advised her to go to ER. She agreed.

## 2020-01-23 RX ORDER — METOPROLOL SUCCINATE 100 MG/1
TABLET, EXTENDED RELEASE ORAL
Qty: 90 TABLET | Refills: 3 | Status: SHIPPED | OUTPATIENT
Start: 2020-01-23 | End: 2020-07-06

## 2020-01-29 ENCOUNTER — OFFICE VISIT (OUTPATIENT)
Dept: CARDIOLOGY CLINIC | Age: 70
End: 2020-01-29
Payer: MEDICARE

## 2020-01-29 VITALS
SYSTOLIC BLOOD PRESSURE: 130 MMHG | HEIGHT: 66 IN | BODY MASS INDEX: 28.77 KG/M2 | HEART RATE: 75 BPM | OXYGEN SATURATION: 97 % | DIASTOLIC BLOOD PRESSURE: 62 MMHG | WEIGHT: 179 LBS

## 2020-01-29 PROCEDURE — 93000 ELECTROCARDIOGRAM COMPLETE: CPT | Performed by: INTERNAL MEDICINE

## 2020-01-29 PROCEDURE — 1090F PRES/ABSN URINE INCON ASSESS: CPT | Performed by: INTERNAL MEDICINE

## 2020-01-29 PROCEDURE — 1123F ACP DISCUSS/DSCN MKR DOCD: CPT | Performed by: INTERNAL MEDICINE

## 2020-01-29 PROCEDURE — G8427 DOCREV CUR MEDS BY ELIG CLIN: HCPCS | Performed by: INTERNAL MEDICINE

## 2020-01-29 PROCEDURE — 4040F PNEUMOC VAC/ADMIN/RCVD: CPT | Performed by: INTERNAL MEDICINE

## 2020-01-29 PROCEDURE — 99214 OFFICE O/P EST MOD 30 MIN: CPT | Performed by: INTERNAL MEDICINE

## 2020-01-29 PROCEDURE — 3017F COLORECTAL CA SCREEN DOC REV: CPT | Performed by: INTERNAL MEDICINE

## 2020-01-29 PROCEDURE — G8484 FLU IMMUNIZE NO ADMIN: HCPCS | Performed by: INTERNAL MEDICINE

## 2020-01-29 PROCEDURE — G8400 PT W/DXA NO RESULTS DOC: HCPCS | Performed by: INTERNAL MEDICINE

## 2020-01-29 PROCEDURE — G8417 CALC BMI ABV UP PARAM F/U: HCPCS | Performed by: INTERNAL MEDICINE

## 2020-01-29 PROCEDURE — 4004F PT TOBACCO SCREEN RCVD TLK: CPT | Performed by: INTERNAL MEDICINE

## 2020-01-29 RX ORDER — APIXABAN 5 MG/1
TABLET, FILM COATED ORAL
COMMUNITY
Start: 2020-01-09 | End: 2020-01-30

## 2020-01-29 RX ORDER — FLECAINIDE ACETATE 100 MG/1
TABLET ORAL
COMMUNITY
Start: 2020-01-09 | End: 2020-01-30

## 2020-01-29 NOTE — PROGRESS NOTES
reflux disease)     High triglycerides     HLD (hyperlipidemia)     Hypertension     Menopause     Osteoarthritis     Stress incontinence     Thyroid disease     Type II or unspecified type diabetes mellitus without mention of complication, not stated as uncontrolled         Past Surgical History:   Procedure Laterality Date    APPENDECTOMY  1953    ATRIAL ABLATION SURGERY N/A 10/30/2018    BRITTANY Atrial Flutter Ablation    CHOLECYSTECTOMY  9/2006    COLONOSCOPY      FACIAL COSMETIC SURGERY  1965    TONSILLECTOMY  1952    TUBAL LIGATION         Allergies: Allergies   Allergen Reactions    Influenza Vaccines Hives       Medication:   Prior to Admission medications    Medication Sig Start Date End Date Taking?  Authorizing Provider   metoprolol succinate (TOPROL XL) 100 MG extended release tablet TAKE 1 TABLET BY MOUTH EVERY DAY 1/23/20   Neo García MD   lisinopril-hydrochlorothiazide (PRINZIDE;ZESTORETIC) 20-25 MG per tablet TAKE 1 TABLET BY MOUTH TWICE A DAY 1/2/20   Neo García MD   diltiazem (CARDIZEM CD) 360 MG extended release capsule TAKE 1 CAPSULE BY MOUTH EVERY DAY 12/30/19   Neo García MD   magnesium oxide (MAG-OX) 400 MG tablet TAKE 1 TABLET BY MOUTH TWICE A DAY 12/9/19   Haidee Boast, MD   digoxin [facility]) 125 MCG tablet Take 1 tablet by mouth daily 12/6/19   Haidee Boast, MD   atorvastatin (LIPITOR) 40 MG tablet TAKE 1 TABLET BY MOUTH EVERY DAY 11/25/19   Neo García MD   Multiple Vitamins-Minerals (EYE VITAMINS) CAPS Take 1 tablet by mouth daily    Historical Provider, MD   warfarin (COUMADIN) 5 MG tablet TAKE 1 TABLET BY MOUTH EVERY DAY OR AS DIRECTED BY CLINIC 5/29/19   Neo García MD   isosorbide mononitrate (IMDUR) 30 MG extended release tablet Take 1 tablet by mouth daily 5/9/19   Neo García MD   warfarin (COUMADIN) 2.5 MG tablet Take 2.5 mg by mouth Mon, Tues, Wed, Fri, Sat    Historical Provider, MD   Multiple Vitamins-Minerals (THERAPEUTIC stools, diarrhea, hematemesis, melena, nausea/vomiting or  swallowing difficulty/pain  · Genito-Urinary ROS: negative for - dysuria or incontinence  · Musculoskeletal ROS: negative for - joint swelling or muscle pain  · Neurological ROS: negative for - confusion, dizziness, gait disturbance, headaches, numbness/tingling, seizures, speech  problems, tremors, visual changes or weakness  · Dermatological ROS: negative for - rash    Physical Examination:  There were no vitals filed for this visit. · Constitutional: Oriented. No distress. · Head: Normocephalic and atraumatic. · Mouth/Throat: Oropharynx is clear and moist.   · Eyes: Conjunctivae normal. EOM are normal.   · Neck: Normal range of motion. Neck supple. No rigidity. No JVD present. · Cardiovascular: regular rate , regular rhythm, S1&S2 and intact distal pulses. · Pulmonary/Chest: Bilateral respiratory sounds. No wheezes. No rhonchi. · Abdominal: Soft. Bowel sounds present. No distension, No tenderness. · Musculoskeletal: No tenderness. No edema    · Lymphadenopathy: Has no cervical adenopathy. · Neurological: Alert and oriented. Cranial nerve appears intact, No Gross deficit   · Skin: Skin is warm and dry. No rash noted. · Psychiatric: Has a normal mood, affect and behavior     Labs: reviewed. EC20 reviewed, sinus rhythm with v-rate of 72 bpm with QRS duration 86 ms. No pathologic Q waves, ventricular pre-excitation, or QT prolongation. 10/1/18: Afib  v-rate 107  10/15/18: Aflutter v-rate 120  19 Sinus rhythm    Other Non-Invasive Studies:     1. Event monitor:  2019  SR 1 Episode 6-7 beat NSVT. 2. Echo: 17 [facility])  EF 55-60%  Grade II DD  Left atrial cavity is severely dilated    3. Stress Test:    None    4. Cath: 17  ANGIOGRAPHIC FINDINGS:  1. The left main coronary artery comes from the left coronary cusp with  normal AYDIN 3 flow.   2.  The left anterior descending artery comes from the left main coronary  artery. The mid portion has approximately 70% stenosis with a fractional flow  reserve of 0.80. 3. The left circumflex comes from the left main coronary. It is small in  caliber and has mild diffuse disease and AYDIN 3 flow. 4.  The right coronary artery comes from the right coronary cusp. It is a  dominant vessel, giving rise to the posterior descending artery and  posterolateral branch. Mid portion has 50% stenosis. 5.  LV ejection fraction of 60%, LVEDP of 14 mmHg.     In summary, moderate left anterior descending artery stenosis of 70% to 75%  severity with a fractional flow reserve of 0.80. Procedures:  1. Successful DCCV 10/4/18   2. Right Atrial flutter ablation 10/30/18  3.  Atrial fibrillation ablation 3/19/19    Assessment:   Patient Active Problem List    Diagnosis Date Noted    S/P ablation of atrial fibrillation 03/19/2019    Persistent atrial fibrillation 03/19/2019    Atrial fibrillation ([facility] 01/10/2019    S/P ablation of atrial flutter 10/30/2018    [facility]    [facility] 09/04/2018    Long term current use of amiodarone 04/20/2018    CAD (coronary artery disease)     HLD (hyperlipidemia)     Essential hypertension     Paroxysmal atrial fibrillation ([address] 01/28/2017    NSTEMI (non-ST elevated myocardial infarction) ([facility] 01/27/2017    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      -The patient opted to proceed with the left atrial flutter ablation. Will schedule for radiofrequency ablation with [employer] system with a BRITTANY procedure immediately before the ablation. She will need to hold her Flecainide for 7 days prior to he procedure and hold Eliquis for 12 hours prior to procedure. Will order BMP, CBC, PT/INR, and Type & Screen prior to the procedure. Follow up three months after left atrial flutter ablation. Thank you for allowing me to participate in the care of Vera Solorzano. All questions and concerns were addressed to the patient/family. Alternatives to my treatment were discussed. This note was scribed in the presence of Jasper Moreau MD by Kamar Bangura RN. The scribe's documentation has been prepared under my direction and personally reviewed by me in its entirety. I confirm that the note above accurately reflects all work, physical examination, the discussion of treatments and procedures, and medical decision making performed by me.     Jasper Moreau MD, MS, [facility]  Cardiac Electrophysiology  The [address] [place] [address]  [phone]

## 2020-01-29 NOTE — PATIENT INSTRUCTIONS
disease. Making changes to improve your heart condition will help you stay healthy and active. Follow-up care is a key part of your treatment and safety. Be sure to make and go to all appointments, and call your doctor if you are having problems. It's also a good idea to know your test results and keep a list of the medicines you take. How can you care for yourself at home? Medicines    · Take your medicines exactly as prescribed. Call your doctor if you think you are having a problem with your medicine. You will get more details on the specific medicines your doctor prescribes.     · If your doctor has given you a blood thinner to prevent a stroke, be sure you get instructions about how to take your medicine safely. Blood thinners can cause serious bleeding problems.     · Do not take any vitamins, over-the-counter drugs, or herbal products without talking to your doctor first.    Lifestyle changes    · Do not smoke. Smoking can increase your chance of a stroke and heart attack. If you need help quitting, talk to your doctor about stop-smoking programs and medicines. These can increase your chances of quitting for good.     · Eat a heart-healthy diet.     · Stay at a healthy weight. Lose weight if you need to.     · Limit alcohol to 2 drinks a day for men and 1 drink a day for women. Too much alcohol can cause health problems.     · Avoid colds and flu. Get a pneumococcal vaccine shot. If you have had one before, ask your doctor whether you need another dose. Get a flu shot every year. If you must be around people with colds or flu, wash your hands often. Activity    · If your doctor recommends it, get more exercise. Walking is a good choice. Bit by bit, increase the amount you walk every day. Try for at least 30 minutes on most days of the week. You also may want to swim, bike, or do other activities.  Your doctor may suggest that you join a cardiac rehabilitation program so that you can have help increasing your physical activity safely.     · Start light exercise if your doctor says it is okay. Even a small amount will help you get stronger, have more energy, and manage stress. Walking is an easy way to get exercise. Start out by walking a little more than you did in the hospital. Gradually increase the amount you walk.     · When you exercise, watch for signs that your heart is working too hard. You are pushing too hard if you cannot talk while you are exercising. If you become short of breath or dizzy or have chest pain, sit down and rest immediately.     · Check your pulse regularly. Place two fingers on the artery at the palm side of your wrist, in line with your thumb. If your heartbeat seems uneven or fast, talk to your doctor. When should you call for help? Call 911 anytime you think you may need emergency care. For example, call if:    · You have symptoms of a heart attack. These may include:  ? Chest pain or pressure, or a strange feeling in the chest.  ? Sweating. ? Shortness of breath. ? Nausea or vomiting. ? Pain, pressure, or a strange feeling in the back, neck, jaw, or upper belly or in one or both shoulders or arms. ? Lightheadedness or sudden weakness. ? A fast or irregular heartbeat. After you call  911, the  may tell you to chew 1 adult-strength or 2 to 4 low-dose aspirin. Wait for an ambulance. Do not try to drive yourself.     · You have symptoms of a stroke. These may include:  ? Sudden numbness, tingling, weakness, or loss of movement in your face, arm, or leg, especially on only one side of your body. ? Sudden vision changes. ? Sudden trouble speaking. ? Sudden confusion or trouble understanding simple statements. ? Sudden problems with walking or balance. ? A sudden, severe headache that is different from past headaches.     · You passed out (lost consciousness).    Call your doctor now or seek immediate medical care if:    · You have new or increased shortness of breath.   · You feel dizzy or lightheaded, or you feel like you may faint.     · Your heart rate becomes irregular.     · You can feel your heart flutter in your chest or skip heartbeats. Tell your doctor if these symptoms are new or worse.    Watch closely for changes in your health, and be sure to contact your doctor if you have any problems. Where can you learn more? Go to https://Stretchr.COVEGA. org and sign in to your [employer] account. Enter U020 in the Yododo box to learn more about \"Atrial Fibrillation: Care Instructions. \"     If you do not have an account, please click on the \"Sign Up Now\" link. Current as of: April 9, 2019  Content Version: 12.3  © 9380-3716 Healthwise, W[employer]. Care instructions adapted under license by [facility] ([facility]). If you have questions about a medical condition or this instruction, always ask your healthcare professional. [place] [address] any warranty or liability for your use of this information. Patient Education        Learning About Catheter Ablation for Heart Rhythm Problems  What is catheter ablation? Catheter ablation is a procedure that treats heart rhythm problems. These problems include atrial fibrillation, supraventricular tachycardia (SVT), atrial flutter, and ventricular tachycardia. Your heart should have a strong, steady beat. That beat is controlled by the heart's electrical system. Sometimes that system misfires. This causes a heartbeat that is too fast and isn't steady. Catheter ablation is a way to get into your heart and fix the problem. Ablation is not surgery. How is catheter ablation done? Your doctor inserts thin tubes called catheters into a blood vessel in your groin, arm, or neck. Then your doctor feeds them into the heart. Wires in the catheters help the doctor find the problem areas.  Then he or she uses the wires to send energy to destroy the tiny areas of heart tissue that are causing the · Tell your doctors ALL the medicines, vitamins, supplements, and herbal remedies you take. Some of these can increase the risk of bleeding or interact with anesthesia.     · If you take aspirin or some other blood thinner, ask your doctor if you should stop taking it before your procedure. Make sure that you understand exactly what your doctor wants you to do. These medicines increase the risk of bleeding.     · Your doctor will tell you which medicines to take or stop before your procedure. You may need to stop taking certain medicines a week or more before the procedure. So talk to your doctor as soon as you can.     · If you have an advance directive, let your doctor know. It may include a living will and a durable power of  for health care. Bring a copy to the hospital. If you don't have one, you may want to prepare one. It lets your doctor and loved ones know your health care wishes. Doctors advise that everyone prepare these papers before any type of surgery or procedure. Procedures can be stressful. This information will help you understand what you can expect. And it will help you safely prepare for your procedure. What happens on the day of the procedure? · Follow the instructions exactly about when to stop eating and drinking. If you don't, your procedure may be canceled. If your doctor told you to take your medicines on the day of the procedure, take them with only a sip of water.     · Take a bath or shower before you come in for your procedure. Do not apply lotions, perfumes, deodorants, or nail polish.     · Take off all jewelry and piercings. And take out contact lenses, if you wear them.    At the hospital or surgery center   · Bring a picture ID.     · You will be kept comfortable and safe by your anesthesia provider. The anesthesia may make you sleep. Or it may just numb the area being worked on.     · EPS by itself may take 1 to 3 hours.  But if you also have ablation, the whole procedure may take 2 to 6 hours.     · After the procedure, pressure will be applied to the area where the catheter was put in your blood vessel. Then the area may be covered with a bandage or a compression device. This will prevent bleeding.     · Nurses will check your heart rate and blood pressure. The nurse will also check the catheter site for bleeding.     · If the catheter was put in your groin, you will need to lie still and keep your leg straight for several hours. The nurse may put a weighted bag on your leg to keep it still.     · If the catheter was put in your arm, you may be able to sit up and get out of bed right away. But you will need to keep your arm still for at least 1 hour.     · You may have a bruise or a small lump where the catheter was put in your blood vessel. This is normal and will go away. Going home   · Be sure you have someone to drive you home. Anesthesia and pain medicine make it unsafe for you to drive.     · You will be given more specific instructions about recovering from your procedure. They will cover things like diet, wound care, follow-up care, driving, and getting back to your normal routine. When should you call your doctor? · You have questions or concerns.     · You don't understand how to prepare for your procedure.     · You become ill before the procedure (such as fever, flu, or a cold).     · You need to reschedule or have changed your mind about having the procedure. Where can you learn more? Go to https://PhoneGuard.ZeroNines Technology. org and sign in to your ThirdSpaceLearning account. Enter G236 in the [place] box to learn more about \"Electrophysiology Study and Catheter Ablation: Before Your Procedure. \"     If you do not have an account, please click on the \"Sign Up Now\" link. Current as of: April 9, 2019  Content Version: 12.3  © 8162-3215 Healthwise, Incorporated. Care instructions adapted under license by [facility] ([facility]).  If you have questions about doctor if you can take acetaminophen (Tylenol) for pain. Do not take aspirin for 3 days, unless your doctor says it is okay.     · Check with your doctor before you take aspirin or anti-inflammatory medicines to reduce pain and swelling. These include ibuprofen (Advil, Motrin) and naproxen (Aleve).   · Make sure you know which heart medicines to continue and which ones to stop. Ask your doctor if you are not sure.   Yvonne Lopez site care    · You can remove your bandages the day after the procedure.     · You may shower 24 to 48 hours after the procedure, if your doctor okays it. Pat the incision dry.     · Do not soak the catheter site until it is healed. Don't take a bath for 1 week, or until your doctor tells you it is okay.     · Watch for bleeding from the site. A small amount of blood (up to the size of a quarter) on the bandage can be normal.     · If you are bleeding, lie down and press on the area for 15 minutes to try to make it stop. If the bleeding does not stop, call your doctor or seek immediate medical care. Follow-up care is a key part of your treatment and safety. Be sure to make and go to all appointments, and call your doctor if you are having problems. It's also a good idea to know your test results and keep a list of the medicines you take. When should you call for help? Call  911 anytime you think you may need emergency care. For example, call if:    · You passed out (lost consciousness).     · You have symptoms of a heart attack. These may include:  ? Chest pain or pressure, or a strange feeling in the chest.  ? Sweating. ? Shortness of breath. ? Nausea or vomiting. ? Pain, pressure, or a strange feeling in the back, neck, jaw, or upper belly, or in one or both shoulders or arms. ? Lightheadedness or sudden weakness. ? A fast or irregular heartbeat. After you call  911, the  may tell you to chew 1 adult-strength or 2 to 4 low-dose aspirin. Wait for an ambulance.  Do not try to drive yourself.     · You have symptoms of a stroke. These may include:  ? Sudden numbness, tingling, weakness, or loss of movement in your face, arm, or leg, especially on only one side of your body. ? Sudden vision changes. ? Sudden trouble speaking. ? Sudden confusion or trouble understanding simple statements. ? Sudden problems with walking or balance. ? A sudden, severe headache that is different from past headaches.    Call your doctor now or seek immediate medical care if:    · You are bleeding from the area where the catheter was put in your blood vessel.     · You have a fast-growing, painful lump at the catheter site.     · You have signs of infection, such as:  ? Increased pain, swelling, warmth, or redness. ? Red streaks leading from the catheter site. ? Pus draining from the catheter site. ? A fever.     · Your leg, arm, or hand is painful, looks blue, or feels cold, numb, or tingly.    Watch closely for any changes in your health, and be sure to contact your doctor if you have any problems. Where can you learn more? Go to [email or web address] [email or web address]©[email or web address].Kaboo Cloud Camera. org and sign in to your Picfair account. Enter [phone] in the [facility] box to learn more about \"Electrophysiology Study and Catheter Ablation: What to Expect at Home. \"     If you do not have an account, please click on the \"Sign Up Now\" link. Current as of: April 9, 2019  Content Version: 12.3  © 1314-4233 Healthwise, Incorporated. Care instructions adapted under license by [facility] [address] If you have questions about a medical condition or this instruction, always ask your healthcare professional. [place] [address] any warranty or liability for your use of this information.

## 2020-01-30 RX ORDER — FLECAINIDE ACETATE 100 MG/1
TABLET ORAL
Qty: 180 TABLET | Refills: 3 | Status: ON HOLD | OUTPATIENT
Start: 2020-01-30 | End: 2020-04-25 | Stop reason: HOSPADM

## 2020-01-30 RX ORDER — APIXABAN 5 MG/1
TABLET, FILM COATED ORAL
Qty: 180 TABLET | Refills: 3 | Status: SHIPPED | OUTPATIENT
Start: 2020-01-30

## 2020-02-27 RX ORDER — ISOSORBIDE MONONITRATE 30 MG/1
30 TABLET, EXTENDED RELEASE ORAL DAILY
Qty: 90 TABLET | Refills: 3 | Status: SHIPPED | OUTPATIENT
Start: 2020-02-27

## 2020-03-20 ENCOUNTER — TELEPHONE (OUTPATIENT)
Dept: CARDIOLOGY CLINIC | Age: 70
End: 2020-03-20

## 2020-03-20 NOTE — TELEPHONE ENCOUNTER
Called and spoke with patient scheduled Left atrial flutter ablation. Left Atrial Flutter Pre procedure Instructions    Date 3/26/2020  Arrive at 6:45 am  Procedure time: 730 am    Our office will be in contact with you to schedule your procedure approximately  two months prior to procedure date. The morning of your procedure you will park in the hospital parking lot and report directly to the cath lab to check in.      Pre-Procedure Instructions   1. You will need to fast (nothing to eat or drink) for at least 8 hours prior to procedure. 2. You will need to hold your Flecainide starting today prior to the procedure. 3. You will need to hold your Eliquis for 12 hours prior to the procedure. 4. You will need to hold your Metformin the monring of the procedure. 5. Do not use any lotions, creams or perfume the morning of procedure. 6. You will need to complete pre-procedure lab work  5-7 days prior to procedure. 7. Please have a responsible adult to drive you home after procedure, you will stay overnight. 8. Cath lab will provide you with all post procedure instructions. 3 month follow up will be scheduled with Dr. Marie Hitchcock post procedure. Patient denies shortness of breath, cough and temp will be checked when she comes in for lab work.

## 2020-03-23 DIAGNOSIS — I48.0 PAROXYSMAL ATRIAL FIBRILLATION (HCC): ICD-10-CM

## 2020-03-23 LAB
ABO/RH: NORMAL
ANION GAP SERPL CALCULATED.3IONS-SCNC: 17 MMOL/L (ref 3–16)
ANTIBODY SCREEN: NORMAL
BUN BLDV-MCNC: 16 MG/DL (ref 7–20)
CALCIUM SERPL-MCNC: 9.6 MG/DL (ref 8.3–10.6)
CHLORIDE BLD-SCNC: 92 MMOL/L (ref 99–110)
CO2: 28 MMOL/L (ref 21–32)
CREAT SERPL-MCNC: 0.6 MG/DL (ref 0.6–1.2)
GFR AFRICAN AMERICAN: >60
GFR NON-AFRICAN AMERICAN: >60
GLUCOSE BLD-MCNC: 284 MG/DL (ref 70–99)
HCT VFR BLD CALC: 42.9 % (ref 36–48)
HEMOGLOBIN: 14.2 G/DL (ref 12–16)
MCH RBC QN AUTO: 28.7 PG (ref 26–34)
MCHC RBC AUTO-ENTMCNC: 33.2 G/DL (ref 31–36)
MCV RBC AUTO: 86.4 FL (ref 80–100)
PDW BLD-RTO: 14.8 % (ref 12.4–15.4)
PLATELET # BLD: 254 K/UL (ref 135–450)
PMV BLD AUTO: 10.2 FL (ref 5–10.5)
POTASSIUM SERPL-SCNC: 4 MMOL/L (ref 3.5–5.1)
RBC # BLD: 4.96 M/UL (ref 4–5.2)
SODIUM BLD-SCNC: 137 MMOL/L (ref 136–145)
WBC # BLD: 11.1 K/UL (ref 4–11)

## 2020-03-24 ENCOUNTER — TELEPHONE (OUTPATIENT)
Dept: CARDIOLOGY CLINIC | Age: 70
End: 2020-03-24

## 2020-04-17 ENCOUNTER — TELEPHONE (OUTPATIENT)
Dept: CARDIOLOGY CLINIC | Age: 70
End: 2020-04-17

## 2020-04-17 NOTE — TELEPHONE ENCOUNTER
Called and spoke with patient scheduled BRITTANY Left atrial flutter ablation     Date: 4/24/2020  Arrive at: 6:45 am  Procedure: 7:30 am      Left atrial flutter ablation Pre procedure Instructions    Our office will be in contact with you to schedule your procedure approximately  two months prior to procedure date. The morning of your procedure you will park in the hospital parking lot and report directly to the cath lab to check in.      Pre-Procedure Instructions   1. You will need to fast (nothing to eat or drink) for at least 8 hours prior to procedure. 2. You will need to hold Flecainide for 7 days  prior to the procedure. 3. You will need to hold your Eliquis for 12 hours prior to the procedure. 4. Hold all diabetic medications including, Metformin the morning of the procedure. If you take Lantus/Levemir only take ½ your normal dose the evening before. 5. Do not use any lotions, creams or perfume the morning of procedure. 6. You will need to complete pre-procedure lab work on 4/20/2020 prior to your COVID 19 test which is scheduled for 4/20/2020 at 12:45 am.  7. Please have a responsible adult to drive you home after procedure, you will stay overnight. 8. Cath lab will provide you with all post procedure instructions     3 month follow up will be scheduled with Dr. Екатерина Escoto post procedure    COVID 19 test scheduled for 4/20/2020 at 12:45 instructed patient that she will need to quarantine herself after she receives her COVID 19 test until after her procedure is completed. Verbalized understanding.

## 2020-04-20 ENCOUNTER — OFFICE VISIT (OUTPATIENT)
Dept: PRIMARY CARE CLINIC | Age: 70
End: 2020-04-20
Payer: MEDICARE

## 2020-04-20 VITALS — TEMPERATURE: 98.2 F | HEART RATE: 90 BPM | OXYGEN SATURATION: 98 %

## 2020-04-20 DIAGNOSIS — I48.92 ATRIAL FLUTTER, PAROXYSMAL (HCC): ICD-10-CM

## 2020-04-20 LAB
ABO/RH: NORMAL
ANION GAP SERPL CALCULATED.3IONS-SCNC: 17 MMOL/L (ref 3–16)
ANTIBODY SCREEN: NORMAL
BUN BLDV-MCNC: 14 MG/DL (ref 7–20)
CALCIUM SERPL-MCNC: 9.8 MG/DL (ref 8.3–10.6)
CHLORIDE BLD-SCNC: 92 MMOL/L (ref 99–110)
CO2: 28 MMOL/L (ref 21–32)
CREAT SERPL-MCNC: 0.6 MG/DL (ref 0.6–1.2)
GFR AFRICAN AMERICAN: >60
GFR NON-AFRICAN AMERICAN: >60
GLUCOSE BLD-MCNC: 213 MG/DL (ref 70–99)
HCT VFR BLD CALC: 40.4 % (ref 36–48)
HEMOGLOBIN: 13.5 G/DL (ref 12–16)
MCH RBC QN AUTO: 28.6 PG (ref 26–34)
MCHC RBC AUTO-ENTMCNC: 33.4 G/DL (ref 31–36)
MCV RBC AUTO: 85.5 FL (ref 80–100)
PDW BLD-RTO: 15.5 % (ref 12.4–15.4)
PLATELET # BLD: 282 K/UL (ref 135–450)
PMV BLD AUTO: 9.8 FL (ref 5–10.5)
POTASSIUM SERPL-SCNC: 4 MMOL/L (ref 3.5–5.1)
RBC # BLD: 4.73 M/UL (ref 4–5.2)
SODIUM BLD-SCNC: 137 MMOL/L (ref 136–145)
WBC # BLD: 12.6 K/UL (ref 4–11)

## 2020-04-20 PROCEDURE — 3017F COLORECTAL CA SCREEN DOC REV: CPT | Performed by: INTERNAL MEDICINE

## 2020-04-20 PROCEDURE — G8428 CUR MEDS NOT DOCUMENT: HCPCS | Performed by: INTERNAL MEDICINE

## 2020-04-20 PROCEDURE — 1123F ACP DISCUSS/DSCN MKR DOCD: CPT | Performed by: INTERNAL MEDICINE

## 2020-04-20 PROCEDURE — 4004F PT TOBACCO SCREEN RCVD TLK: CPT | Performed by: INTERNAL MEDICINE

## 2020-04-20 PROCEDURE — 1090F PRES/ABSN URINE INCON ASSESS: CPT | Performed by: INTERNAL MEDICINE

## 2020-04-20 PROCEDURE — 4040F PNEUMOC VAC/ADMIN/RCVD: CPT | Performed by: INTERNAL MEDICINE

## 2020-04-20 PROCEDURE — 99212 OFFICE O/P EST SF 10 MIN: CPT | Performed by: INTERNAL MEDICINE

## 2020-04-20 PROCEDURE — G8417 CALC BMI ABV UP PARAM F/U: HCPCS | Performed by: INTERNAL MEDICINE

## 2020-04-20 PROCEDURE — G8400 PT W/DXA NO RESULTS DOC: HCPCS | Performed by: INTERNAL MEDICINE

## 2020-04-20 NOTE — PATIENT INSTRUCTIONS
Advance Care Planning  People with COVID-19 may have no symptoms, mild symptoms, such as fever, cough, and shortness of breath or they may have more severe illness, developing severe and fatal pneumonia. As a result, Advance Care Planning with attention to naming a health care decision maker (someone you trust to make healthcare decisions for you if you could not speak for yourself) and sharing other health care preferences is important BEFORE a possible health crisis. Please contact your Primary Care Provider to discuss Advance Care Planning. Preventing the Spread of Coronavirus Disease 2019 in Homes and Residential Communities  For the most recent information go to [email or web address]    Prevention steps for People with confirmed or suspected COVID-19 (including persons under investigation) who do not need to be hospitalized  and   People with confirmed COVID-19 who were hospitalized and determined to be medically stable to go home    Your healthcare provider and public health staff will evaluate whether you can be cared for at home. If it is determined that you do not need to be hospitalized and can be isolated at home, you will be monitored by staff from your local or state health department. You should follow the prevention steps below until a healthcare provider or local or state health department says you can return to your normal activities. Stay home except to get medical care  People who are mildly ill with COVID-19 are able to isolate at home during their illness. You should restrict activities outside your home, except for getting medical care. Do not go to work, school, or public areas. Avoid using public transportation, ride-sharing, or taxis. Separate yourself from other people and animals in your home  People: As much as possible, you should stay in a specific room and away from other people in your home.  Also, you should use a separate have a medical emergency and need to call 911, notify the dispatch personnel that you have, or are being evaluated for COVID-19. If possible, put on a facemask before emergency medical services arrive. Discontinuing home isolation  Patients with confirmed COVID-19 should remain under home isolation precautions until the risk of secondary transmission to others is thought to be low. The decision to discontinue home isolation precautions should be made on a case-by-case basis, in consultation with healthcare providers and state and local health departments. The medication list included in this document is our record of what you are currently taking, including any changes that were made at today's visit.  If you find any differences when compared to your medications at home, or have any questions that were not answered at your visit, please contact the office.

## 2020-04-21 LAB
SARS-COV-2: NOT DETECTED
SOURCE: NORMAL

## 2020-04-23 ENCOUNTER — TELEPHONE (OUTPATIENT)
Dept: PRIMARY CARE CLINIC | Age: 70
End: 2020-04-23

## 2020-04-23 RX ORDER — SODIUM CHLORIDE 0.9 % (FLUSH) 0.9 %
10 SYRINGE (ML) INJECTION PRN
Status: CANCELLED | OUTPATIENT
Start: 2020-04-24

## 2020-04-23 RX ORDER — SODIUM CHLORIDE 0.9 % (FLUSH) 0.9 %
10 SYRINGE (ML) INJECTION EVERY 12 HOURS SCHEDULED
Status: CANCELLED | OUTPATIENT
Start: 2020-04-24

## 2020-04-23 RX ORDER — SODIUM CHLORIDE 9 MG/ML
INJECTION, SOLUTION INTRAVENOUS CONTINUOUS
Status: CANCELLED | OUTPATIENT
Start: 2020-04-24

## 2020-04-24 ENCOUNTER — ANESTHESIA (OUTPATIENT)
Dept: CARDIAC CATH/INVASIVE PROCEDURES | Age: 70
DRG: 274 | End: 2020-04-24
Payer: MEDICARE

## 2020-04-24 ENCOUNTER — ANESTHESIA EVENT (OUTPATIENT)
Dept: CARDIAC CATH/INVASIVE PROCEDURES | Age: 70
DRG: 274 | End: 2020-04-24
Payer: MEDICARE

## 2020-04-24 ENCOUNTER — HOSPITAL ENCOUNTER (INPATIENT)
Dept: CARDIAC CATH/INVASIVE PROCEDURES | Age: 70
LOS: 1 days | Discharge: HOME | DRG: 274 | End: 2020-04-25
Attending: INTERNAL MEDICINE | Admitting: INTERNAL MEDICINE
Payer: MEDICARE

## 2020-04-24 VITALS
SYSTOLIC BLOOD PRESSURE: 127 MMHG | RESPIRATION RATE: 7 BRPM | OXYGEN SATURATION: 91 % | TEMPERATURE: 97.7 F | DIASTOLIC BLOOD PRESSURE: 63 MMHG

## 2020-04-24 PROBLEM — I49.8 ATRIAL ARRHYTHMIA: Status: ACTIVE | Noted: 2020-04-24

## 2020-04-24 PROBLEM — I48.92 LEFT ATRIAL FLUTTER BY ELECTROCARDIOGRAM (HCC): Status: ACTIVE | Noted: 2020-04-24

## 2020-04-24 LAB
ACTIVATED CLOTTING TIME: 196 SEC (ref 99–130)
ACTIVATED CLOTTING TIME: 202 SEC (ref 99–130)
ACTIVATED CLOTTING TIME: 323 SEC (ref 99–130)
ACTIVATED CLOTTING TIME: 363 SEC (ref 99–130)
ACTIVATED CLOTTING TIME: 365 SEC (ref 99–130)
EKG ATRIAL RATE: 94 BPM
EKG DIAGNOSIS: NORMAL
EKG P AXIS: 73 DEGREES
EKG P-R INTERVAL: 152 MS
EKG Q-T INTERVAL: 382 MS
EKG QRS DURATION: 86 MS
EKG QTC CALCULATION (BAZETT): 477 MS
EKG R AXIS: 34 DEGREES
EKG T AXIS: 100 DEGREES
EKG VENTRICULAR RATE: 94 BPM
GLUCOSE BLD-MCNC: 276 MG/DL (ref 70–99)
GLUCOSE BLD-MCNC: 329 MG/DL (ref 70–99)
GLUCOSE BLD-MCNC: 332 MG/DL (ref 70–99)
PERFORMED ON: ABNORMAL

## 2020-04-24 PROCEDURE — 6360000002 HC RX W HCPCS

## 2020-04-24 PROCEDURE — 94761 N-INVAS EAR/PLS OXIMETRY MLT: CPT

## 2020-04-24 PROCEDURE — 93662 INTRACARDIAC ECG (ICE): CPT | Performed by: FAMILY MEDICINE

## 2020-04-24 PROCEDURE — B246ZZZ ULTRASONOGRAPHY OF RIGHT AND LEFT HEART: ICD-10-PCS | Performed by: INTERNAL MEDICINE

## 2020-04-24 PROCEDURE — C1732 CATH, EP, DIAG/ABL, 3D/VECT: HCPCS

## 2020-04-24 PROCEDURE — 2720000010 HC SURG SUPPLY STERILE

## 2020-04-24 PROCEDURE — 6360000002 HC RX W HCPCS: Performed by: NURSE ANESTHETIST, CERTIFIED REGISTERED

## 2020-04-24 PROCEDURE — C1759 CATH, INTRA ECHOCARDIOGRAPHY: HCPCS

## 2020-04-24 PROCEDURE — 6370000000 HC RX 637 (ALT 250 FOR IP): Performed by: INTERNAL MEDICINE

## 2020-04-24 PROCEDURE — 93613 INTRACARDIAC EPHYS 3D MAPG: CPT | Performed by: FAMILY MEDICINE

## 2020-04-24 PROCEDURE — 93613 INTRACARDIAC EPHYS 3D MAPG: CPT | Performed by: INTERNAL MEDICINE

## 2020-04-24 PROCEDURE — 93662 INTRACARDIAC ECG (ICE): CPT | Performed by: INTERNAL MEDICINE

## 2020-04-24 PROCEDURE — 3700000001 HC ADD 15 MINUTES (ANESTHESIA)

## 2020-04-24 PROCEDURE — 2700000000 HC OXYGEN THERAPY PER DAY

## 2020-04-24 PROCEDURE — 4A023FZ MEASUREMENT OF CARDIAC RHYTHM, PERCUTANEOUS APPROACH: ICD-10-PCS | Performed by: INTERNAL MEDICINE

## 2020-04-24 PROCEDURE — 2580000003 HC RX 258: Performed by: NURSE ANESTHETIST, CERTIFIED REGISTERED

## 2020-04-24 PROCEDURE — C1894 INTRO/SHEATH, NON-LASER: HCPCS

## 2020-04-24 PROCEDURE — 3700000000 HC ANESTHESIA ATTENDED CARE

## 2020-04-24 PROCEDURE — 93655 ICAR CATH ABLTJ DSCRT ARRHYT: CPT | Performed by: FAMILY MEDICINE

## 2020-04-24 PROCEDURE — 93622 COMP EP EVAL L VENTR PAC&REC: CPT | Performed by: INTERNAL MEDICINE

## 2020-04-24 PROCEDURE — 93010 ELECTROCARDIOGRAM REPORT: CPT | Performed by: INTERNAL MEDICINE

## 2020-04-24 PROCEDURE — 93657 TX L/R ATRIAL FIB ADDL: CPT | Performed by: INTERNAL MEDICINE

## 2020-04-24 PROCEDURE — 85347 COAGULATION TIME ACTIVATED: CPT

## 2020-04-24 PROCEDURE — 93656 COMPRE EP EVAL ABLTJ ATR FIB: CPT | Performed by: INTERNAL MEDICINE

## 2020-04-24 PROCEDURE — C1730 CATH, EP, 19 OR FEW ELECT: HCPCS

## 2020-04-24 PROCEDURE — 93005 ELECTROCARDIOGRAM TRACING: CPT | Performed by: INTERNAL MEDICINE

## 2020-04-24 PROCEDURE — 4A0234Z MEASUREMENT OF CARDIAC ELECTRICAL ACTIVITY, PERCUTANEOUS APPROACH: ICD-10-PCS | Performed by: INTERNAL MEDICINE

## 2020-04-24 PROCEDURE — 2500000003 HC RX 250 WO HCPCS

## 2020-04-24 PROCEDURE — 2580000003 HC RX 258

## 2020-04-24 PROCEDURE — 2100000000 HC CCU R&B

## 2020-04-24 PROCEDURE — 2709999900 HC NON-CHARGEABLE SUPPLY

## 2020-04-24 PROCEDURE — 02583ZZ DESTRUCTION OF CONDUCTION MECHANISM, PERCUTANEOUS APPROACH: ICD-10-PCS | Performed by: INTERNAL MEDICINE

## 2020-04-24 PROCEDURE — 2580000003 HC RX 258: Performed by: INTERNAL MEDICINE

## 2020-04-24 PROCEDURE — 2500000003 HC RX 250 WO HCPCS: Performed by: NURSE ANESTHETIST, CERTIFIED REGISTERED

## 2020-04-24 PROCEDURE — 93622 COMP EP EVAL L VENTR PAC&REC: CPT | Performed by: FAMILY MEDICINE

## 2020-04-24 PROCEDURE — 93657 TX L/R ATRIAL FIB ADDL: CPT | Performed by: FAMILY MEDICINE

## 2020-04-24 PROCEDURE — 02K83ZZ MAP CONDUCTION MECHANISM, PERCUTANEOUS APPROACH: ICD-10-PCS | Performed by: INTERNAL MEDICINE

## 2020-04-24 PROCEDURE — 93656 COMPRE EP EVAL ABLTJ ATR FIB: CPT | Performed by: FAMILY MEDICINE

## 2020-04-24 PROCEDURE — B246ZZ4 ULTRASONOGRAPHY OF RIGHT AND LEFT HEART, TRANSESOPHAGEAL: ICD-10-PCS | Performed by: ANESTHESIOLOGY

## 2020-04-24 PROCEDURE — 93623 PRGRMD STIMJ&PACG IV RX NFS: CPT | Performed by: INTERNAL MEDICINE

## 2020-04-24 RX ORDER — METOPROLOL SUCCINATE 50 MG/1
100 TABLET, EXTENDED RELEASE ORAL DAILY
Status: DISCONTINUED | OUTPATIENT
Start: 2020-04-24 | End: 2020-04-25 | Stop reason: HOSPADM

## 2020-04-24 RX ORDER — DILTIAZEM HYDROCHLORIDE 180 MG/1
360 CAPSULE, COATED, EXTENDED RELEASE ORAL DAILY
Status: DISCONTINUED | OUTPATIENT
Start: 2020-04-24 | End: 2020-04-25 | Stop reason: HOSPADM

## 2020-04-24 RX ORDER — HEPARIN SODIUM 10000 [USP'U]/100ML
INJECTION, SOLUTION INTRAVENOUS CONTINUOUS PRN
Status: DISCONTINUED | OUTPATIENT
Start: 2020-04-24 | End: 2020-04-24 | Stop reason: SDUPTHER

## 2020-04-24 RX ORDER — PROPOFOL 10 MG/ML
INJECTION, EMULSION INTRAVENOUS PRN
Status: DISCONTINUED | OUTPATIENT
Start: 2020-04-24 | End: 2020-04-24 | Stop reason: SDUPTHER

## 2020-04-24 RX ORDER — FENTANYL CITRATE 50 UG/ML
INJECTION, SOLUTION INTRAMUSCULAR; INTRAVENOUS PRN
Status: DISCONTINUED | OUTPATIENT
Start: 2020-04-24 | End: 2020-04-24 | Stop reason: SDUPTHER

## 2020-04-24 RX ORDER — VECURONIUM BROMIDE 1 MG/ML
INJECTION, POWDER, LYOPHILIZED, FOR SOLUTION INTRAVENOUS PRN
Status: DISCONTINUED | OUTPATIENT
Start: 2020-04-24 | End: 2020-04-24 | Stop reason: SDUPTHER

## 2020-04-24 RX ORDER — PROTAMINE SULFATE 10 MG/ML
30 INJECTION, SOLUTION INTRAVENOUS
Status: ACTIVE | OUTPATIENT
Start: 2020-04-24 | End: 2020-04-24

## 2020-04-24 RX ORDER — SUCCINYLCHOLINE CHLORIDE 20 MG/ML
INJECTION INTRAMUSCULAR; INTRAVENOUS PRN
Status: DISCONTINUED | OUTPATIENT
Start: 2020-04-24 | End: 2020-04-24 | Stop reason: SDUPTHER

## 2020-04-24 RX ORDER — PROTAMINE SULFATE 10 MG/ML
INJECTION, SOLUTION INTRAVENOUS PRN
Status: DISCONTINUED | OUTPATIENT
Start: 2020-04-24 | End: 2020-04-24 | Stop reason: SDUPTHER

## 2020-04-24 RX ORDER — 0.9 % SODIUM CHLORIDE 0.9 %
1000 INTRAVENOUS SOLUTION INTRAVENOUS
Status: ACTIVE | OUTPATIENT
Start: 2020-04-24 | End: 2020-04-24

## 2020-04-24 RX ORDER — HEPARIN SODIUM 1000 [USP'U]/ML
INJECTION, SOLUTION INTRAVENOUS; SUBCUTANEOUS PRN
Status: DISCONTINUED | OUTPATIENT
Start: 2020-04-24 | End: 2020-04-24 | Stop reason: SDUPTHER

## 2020-04-24 RX ORDER — LORAZEPAM 0.5 MG/1
0.5 TABLET ORAL NIGHTLY
Status: DISCONTINUED | OUTPATIENT
Start: 2020-04-24 | End: 2020-04-25 | Stop reason: HOSPADM

## 2020-04-24 RX ORDER — SODIUM CHLORIDE 0.9 % (FLUSH) 0.9 %
10 SYRINGE (ML) INJECTION EVERY 12 HOURS SCHEDULED
Status: DISCONTINUED | OUTPATIENT
Start: 2020-04-24 | End: 2020-04-25 | Stop reason: HOSPADM

## 2020-04-24 RX ORDER — DEXAMETHASONE SODIUM PHOSPHATE 4 MG/ML
INJECTION, SOLUTION INTRA-ARTICULAR; INTRALESIONAL; INTRAMUSCULAR; INTRAVENOUS; SOFT TISSUE PRN
Status: DISCONTINUED | OUTPATIENT
Start: 2020-04-24 | End: 2020-04-24 | Stop reason: SDUPTHER

## 2020-04-24 RX ORDER — ATORVASTATIN CALCIUM 40 MG/1
40 TABLET, FILM COATED ORAL DAILY
Status: DISCONTINUED | OUTPATIENT
Start: 2020-04-24 | End: 2020-04-25 | Stop reason: HOSPADM

## 2020-04-24 RX ORDER — PANTOPRAZOLE SODIUM 40 MG/1
40 TABLET, DELAYED RELEASE ORAL
Status: DISCONTINUED | OUTPATIENT
Start: 2020-04-25 | End: 2020-04-25 | Stop reason: HOSPADM

## 2020-04-24 RX ORDER — ONDANSETRON 2 MG/ML
INJECTION INTRAMUSCULAR; INTRAVENOUS PRN
Status: DISCONTINUED | OUTPATIENT
Start: 2020-04-24 | End: 2020-04-24 | Stop reason: SDUPTHER

## 2020-04-24 RX ORDER — MIDAZOLAM HYDROCHLORIDE 1 MG/ML
INJECTION INTRAMUSCULAR; INTRAVENOUS PRN
Status: DISCONTINUED | OUTPATIENT
Start: 2020-04-24 | End: 2020-04-24 | Stop reason: SDUPTHER

## 2020-04-24 RX ORDER — ISOSORBIDE MONONITRATE 30 MG/1
30 TABLET, EXTENDED RELEASE ORAL DAILY
Status: DISCONTINUED | OUTPATIENT
Start: 2020-04-24 | End: 2020-04-25 | Stop reason: HOSPADM

## 2020-04-24 RX ORDER — LIDOCAINE HYDROCHLORIDE 10 MG/ML
INJECTION, SOLUTION EPIDURAL; INFILTRATION; INTRACAUDAL; PERINEURAL PRN
Status: DISCONTINUED | OUTPATIENT
Start: 2020-04-24 | End: 2020-04-24 | Stop reason: SDUPTHER

## 2020-04-24 RX ORDER — GLYCOPYRROLATE 0.2 MG/ML
INJECTION INTRAMUSCULAR; INTRAVENOUS PRN
Status: DISCONTINUED | OUTPATIENT
Start: 2020-04-24 | End: 2020-04-24 | Stop reason: SDUPTHER

## 2020-04-24 RX ORDER — FUROSEMIDE 10 MG/ML
INJECTION INTRAMUSCULAR; INTRAVENOUS PRN
Status: DISCONTINUED | OUTPATIENT
Start: 2020-04-24 | End: 2020-04-24 | Stop reason: SDUPTHER

## 2020-04-24 RX ORDER — SODIUM CHLORIDE 0.9 % (FLUSH) 0.9 %
10 SYRINGE (ML) INJECTION PRN
Status: DISCONTINUED | OUTPATIENT
Start: 2020-04-24 | End: 2020-04-25 | Stop reason: HOSPADM

## 2020-04-24 RX ORDER — LISINOPRIL AND HYDROCHLOROTHIAZIDE 12.5; 1 MG/1; MG/1
2 TABLET ORAL 2 TIMES DAILY
Status: DISCONTINUED | OUTPATIENT
Start: 2020-04-24 | End: 2020-04-25 | Stop reason: HOSPADM

## 2020-04-24 RX ORDER — ACETAMINOPHEN 325 MG/1
650 TABLET ORAL EVERY 4 HOURS PRN
Status: DISCONTINUED | OUTPATIENT
Start: 2020-04-24 | End: 2020-04-25 | Stop reason: HOSPADM

## 2020-04-24 RX ORDER — SODIUM CHLORIDE 9 MG/ML
INJECTION, SOLUTION INTRAVENOUS CONTINUOUS PRN
Status: DISCONTINUED | OUTPATIENT
Start: 2020-04-24 | End: 2020-04-24 | Stop reason: SDUPTHER

## 2020-04-24 RX ORDER — FLECAINIDE ACETATE 100 MG/1
50 TABLET ORAL 2 TIMES DAILY
Status: DISCONTINUED | OUTPATIENT
Start: 2020-04-24 | End: 2020-04-25 | Stop reason: HOSPADM

## 2020-04-24 RX ORDER — SODIUM CHLORIDE 9 MG/ML
INJECTION INTRAVENOUS PRN
Status: DISCONTINUED | OUTPATIENT
Start: 2020-04-24 | End: 2020-04-24 | Stop reason: SDUPTHER

## 2020-04-24 RX ADMIN — PROPOFOL 100 MG: 10 INJECTION, EMULSION INTRAVENOUS at 07:51

## 2020-04-24 RX ADMIN — FUROSEMIDE 40 MG: 10 INJECTION, SOLUTION INTRAVENOUS at 10:02

## 2020-04-24 RX ADMIN — ISOSORBIDE MONONITRATE 30 MG: 30 TABLET, EXTENDED RELEASE ORAL at 13:00

## 2020-04-24 RX ADMIN — VECURONIUM BROMIDE 10 MG: 1 INJECTION, POWDER, LYOPHILIZED, FOR SOLUTION INTRAVENOUS at 08:43

## 2020-04-24 RX ADMIN — METFORMIN HYDROCHLORIDE 850 MG: 850 TABLET ORAL at 13:00

## 2020-04-24 RX ADMIN — HEPARIN SODIUM 2000 UNITS: 1000 INJECTION, SOLUTION INTRAVENOUS; SUBCUTANEOUS at 09:13

## 2020-04-24 RX ADMIN — Medication 10 ML: at 20:26

## 2020-04-24 RX ADMIN — FLECAINIDE ACETATE 50 MG: 100 TABLET ORAL at 13:00

## 2020-04-24 RX ADMIN — INSULIN LISPRO 8 UNITS: 100 INJECTION, SOLUTION INTRAVENOUS; SUBCUTANEOUS at 16:38

## 2020-04-24 RX ADMIN — HEPARIN SODIUM AND DEXTROSE 8000 UNITS/HR: 10000; 5 INJECTION INTRAVENOUS at 09:13

## 2020-04-24 RX ADMIN — MIDAZOLAM 2 MG: 1 INJECTION INTRAMUSCULAR; INTRAVENOUS at 07:44

## 2020-04-24 RX ADMIN — DILTIAZEM HYDROCHLORIDE 360 MG: 180 CAPSULE, COATED, EXTENDED RELEASE ORAL at 13:00

## 2020-04-24 RX ADMIN — METOPROLOL SUCCINATE 100 MG: 50 TABLET, EXTENDED RELEASE ORAL at 13:00

## 2020-04-24 RX ADMIN — APIXABAN 5 MG: 5 TABLET, FILM COATED ORAL at 20:23

## 2020-04-24 RX ADMIN — VECURONIUM BROMIDE 10 MG: 1 INJECTION, POWDER, LYOPHILIZED, FOR SOLUTION INTRAVENOUS at 07:54

## 2020-04-24 RX ADMIN — SUGAMMADEX 200 MG: 100 INJECTION, SOLUTION INTRAVENOUS at 10:54

## 2020-04-24 RX ADMIN — METFORMIN HYDROCHLORIDE 850 MG: 850 TABLET ORAL at 16:38

## 2020-04-24 RX ADMIN — FLECAINIDE ACETATE 50 MG: 100 TABLET ORAL at 20:23

## 2020-04-24 RX ADMIN — DEXAMETHASONE SODIUM PHOSPHATE 8 MG: 4 INJECTION, SOLUTION INTRAMUSCULAR; INTRAVENOUS at 07:56

## 2020-04-24 RX ADMIN — LISINOPRIL AND HYDROCHLOROTHIAZIDE 2 TABLET: 12.5; 1 TABLET ORAL at 13:01

## 2020-04-24 RX ADMIN — ATORVASTATIN CALCIUM 40 MG: 40 TABLET, FILM COATED ORAL at 13:00

## 2020-04-24 RX ADMIN — FUROSEMIDE 40 MG: 10 INJECTION, SOLUTION INTRAVENOUS at 10:45

## 2020-04-24 RX ADMIN — SUCCINYLCHOLINE CHLORIDE 120 MG: 20 INJECTION, SOLUTION INTRAMUSCULAR; INTRAVENOUS at 07:51

## 2020-04-24 RX ADMIN — APIXABAN 5 MG: 5 TABLET, FILM COATED ORAL at 15:06

## 2020-04-24 RX ADMIN — SODIUM CHLORIDE 10 ML: 9 INJECTION INTRAMUSCULAR; INTRAVENOUS; SUBCUTANEOUS at 07:54

## 2020-04-24 RX ADMIN — GLYCOPYRROLATE 0.2 MG: 0.2 INJECTION, SOLUTION INTRAMUSCULAR; INTRAVENOUS at 07:56

## 2020-04-24 RX ADMIN — LIDOCAINE HYDROCHLORIDE 50 MG: 10 INJECTION, SOLUTION EPIDURAL; INFILTRATION; INTRACAUDAL; PERINEURAL at 07:51

## 2020-04-24 RX ADMIN — HEPARIN SODIUM 5000 UNITS: 1000 INJECTION, SOLUTION INTRAVENOUS; SUBCUTANEOUS at 08:54

## 2020-04-24 RX ADMIN — HEPARIN SODIUM 3000 UNITS: 1000 INJECTION, SOLUTION INTRAVENOUS; SUBCUTANEOUS at 09:33

## 2020-04-24 RX ADMIN — PROTAMINE SULFATE 50 MG: 10 INJECTION, SOLUTION INTRAVENOUS at 10:53

## 2020-04-24 RX ADMIN — FENTANYL CITRATE 100 MCG: 50 INJECTION INTRAMUSCULAR; INTRAVENOUS at 07:47

## 2020-04-24 RX ADMIN — INSULIN LISPRO 3 UNITS: 100 INJECTION, SOLUTION INTRAVENOUS; SUBCUTANEOUS at 20:25

## 2020-04-24 RX ADMIN — SODIUM CHLORIDE: 9 INJECTION, SOLUTION INTRAVENOUS at 07:43

## 2020-04-24 RX ADMIN — HEPARIN SODIUM 5000 UNITS: 1000 INJECTION, SOLUTION INTRAVENOUS; SUBCUTANEOUS at 08:43

## 2020-04-24 RX ADMIN — ONDANSETRON 4 MG: 2 INJECTION INTRAMUSCULAR; INTRAVENOUS at 07:56

## 2020-04-24 RX ADMIN — LORAZEPAM 0.5 MG: 0.5 TABLET ORAL at 20:23

## 2020-04-24 ASSESSMENT — PULMONARY FUNCTION TESTS
PIF_VALUE: 18
PIF_VALUE: 17
PIF_VALUE: 18
PIF_VALUE: 18
PIF_VALUE: 19
PIF_VALUE: 17
PIF_VALUE: 18
PIF_VALUE: 18
PIF_VALUE: 19
PIF_VALUE: 19
PIF_VALUE: 18
PIF_VALUE: 0
PIF_VALUE: 18
PIF_VALUE: 19
PIF_VALUE: 18
PIF_VALUE: 18
PIF_VALUE: 19
PIF_VALUE: 18
PIF_VALUE: 1
PIF_VALUE: 17
PIF_VALUE: 19
PIF_VALUE: 19
PIF_VALUE: 18
PIF_VALUE: 0
PIF_VALUE: 18
PIF_VALUE: 18
PIF_VALUE: 19
PIF_VALUE: 18
PIF_VALUE: 19
PIF_VALUE: 18
PIF_VALUE: 22
PIF_VALUE: 18
PIF_VALUE: 19
PIF_VALUE: 18
PIF_VALUE: 24
PIF_VALUE: 18
PIF_VALUE: 19
PIF_VALUE: 18
PIF_VALUE: 22
PIF_VALUE: 19
PIF_VALUE: 18
PIF_VALUE: 19
PIF_VALUE: 18
PIF_VALUE: 18
PIF_VALUE: 15
PIF_VALUE: 19
PIF_VALUE: 18
PIF_VALUE: 22
PIF_VALUE: 18
PIF_VALUE: 19
PIF_VALUE: 1
PIF_VALUE: 1
PIF_VALUE: 18
PIF_VALUE: 19
PIF_VALUE: 18
PIF_VALUE: 38
PIF_VALUE: 19
PIF_VALUE: 26
PIF_VALUE: 18
PIF_VALUE: 17
PIF_VALUE: 19
PIF_VALUE: 17
PIF_VALUE: 18
PIF_VALUE: 0
PIF_VALUE: 19
PIF_VALUE: 18
PIF_VALUE: 15
PIF_VALUE: 19
PIF_VALUE: 19
PIF_VALUE: 18
PIF_VALUE: 18
PIF_VALUE: 1
PIF_VALUE: 19
PIF_VALUE: 18
PIF_VALUE: 19
PIF_VALUE: 19
PIF_VALUE: 18
PIF_VALUE: 22
PIF_VALUE: 18
PIF_VALUE: 18
PIF_VALUE: 19
PIF_VALUE: 19
PIF_VALUE: 18
PIF_VALUE: 19
PIF_VALUE: 18
PIF_VALUE: 18
PIF_VALUE: 0
PIF_VALUE: 17
PIF_VALUE: 18
PIF_VALUE: 14
PIF_VALUE: 19
PIF_VALUE: 18
PIF_VALUE: 19
PIF_VALUE: 17
PIF_VALUE: 1
PIF_VALUE: 19
PIF_VALUE: 15
PIF_VALUE: 19
PIF_VALUE: 1
PIF_VALUE: 19
PIF_VALUE: 19
PIF_VALUE: 18
PIF_VALUE: 15
PIF_VALUE: 18
PIF_VALUE: 17
PIF_VALUE: 19
PIF_VALUE: 19
PIF_VALUE: 18
PIF_VALUE: 18
PIF_VALUE: 19
PIF_VALUE: 18
PIF_VALUE: 19
PIF_VALUE: 18
PIF_VALUE: 19
PIF_VALUE: 17
PIF_VALUE: 17
PIF_VALUE: 15
PIF_VALUE: 17
PIF_VALUE: 19
PIF_VALUE: 18
PIF_VALUE: 19
PIF_VALUE: 18
PIF_VALUE: 19
PIF_VALUE: 18
PIF_VALUE: 19
PIF_VALUE: 19
PIF_VALUE: 15
PIF_VALUE: 17
PIF_VALUE: 18
PIF_VALUE: 3
PIF_VALUE: 18
PIF_VALUE: 19
PIF_VALUE: 17
PIF_VALUE: 19
PIF_VALUE: 18
PIF_VALUE: 18
PIF_VALUE: 19
PIF_VALUE: 18
PIF_VALUE: 22
PIF_VALUE: 17
PIF_VALUE: 18
PIF_VALUE: 19
PIF_VALUE: 18
PIF_VALUE: 19
PIF_VALUE: 19
PIF_VALUE: 22
PIF_VALUE: 19
PIF_VALUE: 18
PIF_VALUE: 1
PIF_VALUE: 17
PIF_VALUE: 17
PIF_VALUE: 23
PIF_VALUE: 1
PIF_VALUE: 19
PIF_VALUE: 18
PIF_VALUE: 20

## 2020-04-24 ASSESSMENT — ENCOUNTER SYMPTOMS: SHORTNESS OF BREATH: 0

## 2020-04-24 ASSESSMENT — PAIN SCALES - GENERAL
PAINLEVEL_OUTOF10: 0

## 2020-04-24 NOTE — PROGRESS NOTES
[address] [facility]  care of patient from [facility]. Patient resting quietly in bed, vital signs stable. FSBS noted to be 329. Call to Dr Welch Back to notify. Ok to start medium sliding scale insulin. No complaints noted at this time. Will continue to monitor. 1600- patient up to bathroom, no bleeding noted. Able to have BM at this time. No complaints at this time. 36- Dr Welch Back at bedside for eval. Discussed plan of care, discharge in AM.    9591- patient without needs at this time, sitting up in chair. Will monitor. 2932- Report given to MELVIN Armenta to assume care.

## 2020-04-24 NOTE — ANESTHESIA PRE PROCEDURE
[facility] Department of Anesthesiology  Pre-Anesthesia Evaluation/Consultation       Name:  Sarah Mcnair  : 1950  Age:  69 y.o. MRN:  [id number]  Date: 2020           Surgeon: * No surgeons listed *    Procedure: * No procedures listed *     Allergies   Allergen Reactions    Influenza Vaccines Hives     Patient Active Problem List   Diagnosis    Hypertension    Menopause    Osteoarthritis    Type 2 diabetes mellitus without complication, without long-term current use of insulin ([facility]    High triglycerides    Diabetes mellitus due to underlying condition with mild nonproliferative diabetic retinopathy without macular edema ([facility])    NSTEMI (non-ST elevated myocardial infarction) ([facility]    Paroxysmal atrial fibrillation ([facility])    Essential hypertension    CAD (coronary artery disease)    HLD (hyperlipidemia)    Long term current use of amiodarone    Atrial fibrillation with RVR ([facility]    S/P ablation of atrial flutter    Right atrial flutter by electrocardiogram ([facility])    Atrial fibrillation ([facility])    S/P ablation of atrial fibrillation    Persistent atrial fibrillation     Past Medical History:   Diagnosis Date    [facility]rial fibrillation [facility]     CAD (coronary artery disease)     NSTEMI -->Echo LVEF normal, grade II diastolic dysfx, LAE. Cath 17 70-75% LAD, FFR LAD 0.80, 50% PLD- Med tx.      GERD (gastroesophageal reflux disease)     High triglycerides     HLD (hyperlipidemia)     Hypertension     Menopause     Osteoarthritis     Stress incontinence     Thyroid disease     Type II or unspecified type diabetes mellitus without mention of complication, not stated as uncontrolled      Past Surgical History:   Procedure Laterality Date    APPENDECTOMY      ATRIAL ABLATION SURGERY N/A 10/30/2018    BRITTANY Atrial Flutter Ablation    CHOLECYSTECTOMY  2006    COLONOSCOPY      FACIAL COSMETIC SURGERY  1965    TONSILLECTOMY

## 2020-04-24 NOTE — PROCEDURES
Maximum output (20mA) pacing in the L antrum, R antrum, posterior box were also performed and showed dissociation from the rest of the left atrium. This was checked circumferentially around the antrums and verified many times. The mitral isthmus was checked and showed incomplete electrical block (we did not ablate across the mitral isthmus line, since we ablated anteriorly from the mitral annulus to the R antrum). We checked the roof line and found that there was complete, bidirectional block. Adenosine bolus of 18mg was also given once to assess for acute re-connection and to attempt to induce atrial fibrillation. We had adequate adenosine effect (AV blocks of > 5 seconds) and there were no pulmonary fascicles seen in any of the veins nor were there any atrial tachydysrhythmia induced. We then checked the cavotricuspid isthmus which was previously ablated. Bidirectional block was confirmed by noting trans-isthmus conduction time of 166 ms from the CS to a focus just lateral to the line, and a conduction time of 146 ms from the CS to a focus more lateral to the ablated line. Similarly, when pacing from the high lateral RA, the conduction time to the CS was 170 ms compared to a transit time of 196 ms just lateral to the ablated line to the CS. We then performed an EP study and programmed stimulation using our CS catheter and ablation cathter to assess the cardiac conduction system and to attempt to induce atrial tachydysrhythmia. The ablation catheter were moved from the left atrium to the left ventricular apex and His bundle position.  His bundle potentials was recorded and pacing and recordings were performed from right atrium, coronary sinus and LV apex with the following results:     Sinus cycle length was 654 msec  WY interval was 161 msec  QRS duration was 82 msec  QT interval was 352 msec  AH interval was 58 msec  HV interval was 48 msec  Pacing from left atrium, 1:1 conduction over AV node with (AV wenckebach) was 330  msec  Pacing from left atrium, AV lashae ERP was 600/270 msec   Pacing from LV apex, 1:1 retrograde conduction over AV node (VA wenckebach) was 340 msec  Pacing from LV apex, showed VAERP of 600/310 msec. Then both the ablation, Pentarray, and CS catheters were removed from the body, and all 3 sheaths were removed from the right femoral vein with a figure-of-eight suture that was placed to provide hemostasis while awaiting the downtrending of the ACT. Protamine 50mg IV x 1 was administered to partially reverse the IV heparin that was administered during the atrial fibrillation ablation procedure. Under intracardiac ultrasound guidance, we evaluated for pericardail effusion, and there was no evidence of such. Specimen collected: none    Estimated blood loss: < 50 cc    The patient tolerated the procedure well and there were no complications. Patient was extubated and transferred to the floor in stable condition. Conclusion:     - Pre- and post-procedure diagnoses were roof-dependent left atrial flutter (cycle length 322ms with [id number]-[id number] activation), SVR/RA dependent micro-reentrant atrial flutter (cycle length 249 ms with [id number]-er-[id number] activation), and LA anterior wall-dependent micro-reentrant tachycardia with cycle length 230 ms with vitolvdb-xa-jysuls activation)  - Ablation on the roof and mid-posterior box  - Ablation on the SVC/RA junction circumferentially  - Ablation on the anterior LA wall from mitral annulus to the R antrum     Plan:   The patient will be admitted overnight to CVU. The patient will receive usual post ablation care. If there are no complications, the patient will be discharged from the hospital tomorrow with pre-admission Flecainide but decreased from 100mg BID to now 50mg BID, pre-admission Toprol 100mg daily, and pre-admission Eliquis 5mg BID. The patient will follow-up with Dr. Nga Amaya in 3 month's time.  The patient is also on Cardizem CD 360mg daily and Lisinopril-HCTZ 20-25 for BP control. Thank you for allowing us to participate in the care of your patient. If you have any questions or concerns, please do not hesitate to contact me.     Cuong George MD, MS, [facility], [facility]  Cardiac Electrophysiology  [address]  [address] [facility] [address], [address] [place] [address]  Corbin Nye [facility] [address]  [phone]

## 2020-04-24 NOTE — ANESTHESIA POSTPROCEDURE EVALUATION
Department of Anesthesiology  Postprocedure Note    Patient: Dimple Blas  MRN: [id number]  YOB: 1950  Date of evaluation: 4/24/2020  Time:  11:54 AM     Procedure Summary     Date:  04/24/20 Room / Location:  [address] [place] [address] Cath Lab    Anesthesia Start:  1159 Anesthesia Stop:  1114    Procedure:  BRITTANY AFLUTTER ABLATION W/ ANES Diagnosis:       Atrial arrhythmia      Left atrial flutter by electrocardiogram [facility])    Scheduled Providers:   Responsible Provider:  Tao Wiley MD    Anesthesia Type:  general ASA Status:  3          Anesthesia Type: general    Sunitha Phase I:      Sunitha Phase II:      Last vitals: Reviewed and per EMR flowsheets.        Anesthesia Post Evaluation    Patient location during evaluation: PACU  Level of consciousness: awake and alert  Airway patency: patent  Nausea & Vomiting: no nausea and no vomiting  Complications: no  Cardiovascular status: blood pressure returned to baseline  Respiratory status: acceptable  Hydration status: euvolemic  Comments: Postoperative Anesthesia Note    Name:    Dimple Blas  MRN:      [id number]    Patient Vitals in the past 12 hrs:  04/24/20 1144, Resp:18, SpO2:93 %  04/24/20 1136, Pulse:93, Resp:21, SpO2:91 %  04/24/20 1132, BP:(!) 112/57, Temp:98.1 °F (36.7 °C), Temp src:Oral, Pulse:95, Resp:16, SpO2:92 %  04/24/20 1122, Pulse:91, Resp:21, SpO2:92 %  04/24/20 1120, BP:(!) 108/56, Pulse:88, Resp:20, SpO2:92 %  04/24/20 0738, BP:(!) 172/94, Temp:98.3 °F (36.8 °C), Pulse:93, Resp:15, SpO2:98 %     LABS:    CBC  Lab Results       Component                Value               Date/Time                  WBC                      12.6 (H)            04/20/2020 01:03 PM        HGB                      13.5                04/20/2020 01:03 PM        HCT                      40.4                04/20/2020 01:03 PM        PLT                      282                 04/20/2020 01:03 PM   RENAL  Lab Results       Component                Value Date/Time                  NA                       137                 04/20/2020 01:03 PM        K                        4.0                 04/20/2020 01:03 PM        K                        3.8                 09/07/2018 07:01 AM        CL                       92 (L)              04/20/2020 01:03 PM        CO2                      28                  04/20/2020 01:03 PM        BUN                      14                  04/20/2020 01:03 PM        CREATININE               0.6                 04/20/2020 01:03 PM        GLUCOSE                  213 (H)             04/20/2020 01:03 PM   COAGS  Lab Results       Component                Value               Date/Time                  PROTIME                  50.5 (H)            03/20/2019 05:31 AM        INR                      4.43 (H)            03/20/2019 05:31 AM        APTT                     40.5 (H)            01/31/2017 09:58 AM     Intake & Output:  @31MXBV@    Nausea & Vomiting:  No    Level of Consciousness:  Awake    Pain Assessment:  Adequate analgesia    Anesthesia Complications:  No apparent anesthetic complications    SUMMARY      Vital signs stable  OK to discharge from Stage I post anesthesia care.   Care transferred from Anesthesiology department on discharge from perioperative area

## 2020-04-24 NOTE — H&P
[address]   Electrophysiology Consultation   Date: 4/24/2020  Reason for Consultation: Afib  Consult Requesting Physician: Tanja Ku MD  Primary Care Physician: Fransico Rand MD     Chief Complaint:   No chief complaint on file.                                     HPI: Amanda Friday is a 69 y.o. with a PMH significant for NSTEMI (2017), HLD, hypothyroidism, HTN, DM and PAF first diagnosed in 2017 in the setting of acute coronary syndrome who arrived to [facility] 9/4/18 with complaints of palpitations and fatigue. She was found to be back in AFib RVR and her beta blocker therapy was increased for tighter rate control. She was scheduled for a DCCV but spontaneously converted on her own prior to procedure. During office follow up visit on 10/1/19 EKG confirmed she was back in afib/aflutter. She subsequently underwent successful cardioversion on 10/4/19 . She underwent right atrial flutter ablation on 10/30/18 and and atrial fibrillation ablation on 3/19/19. She is typically followed by Dr. Jacquelyn Mott for her cardiac needs. She presented to [facility] ED on 1/7/2020 at the instruction of her primary cardiologist  Tanja Ku MD after calling into his office reporting high heart rates and palpitations.      Interval History: Today, she is here for hospital follow up. She was seen at [facility] on 1/7/2020 and was found to be in rapid atrial flutter. She states she has been feeling much better since she was restarted on Flecainide. She is compliant with her medications and tolerating them well. She denies chest pain/pressure, tightness, edema, shortness of breath, heart racing, palpitations, lightheadedness, dizziness, syncope, presyncope,  PND or orthopnea.      Past Medical History        Past Medical History:   Diagnosis Date    Atrial fibrillation ([address]      CAD (coronary artery disease)       NSTEMI 1/201-->Echo LVEF normal, grade II diastolic dysfx, LAE.  Cath 1/27/17 70-75% LAD, FFR LAD 0.80, 50% PLD- Med tx.  GERD (gastroesophageal reflux disease)      High triglycerides      HLD (hyperlipidemia)      Hypertension      Menopause      Osteoarthritis      Stress incontinence      Thyroid disease      Type II or unspecified type diabetes mellitus without mention of complication, not stated as uncontrolled              Past Surgical History         Past Surgical History:   Procedure Laterality Date    APPENDECTOMY   1953    ATRIAL ABLATION SURGERY N/A 10/30/2018     BRITTANY Atrial Flutter Ablation    CHOLECYSTECTOMY   9/2006    COLONOSCOPY        FACIAL COSMETIC SURGERY   1965    TONSILLECTOMY   1952    TUBAL LIGATION                Allergies: Allergies   Allergen Reactions    Influenza Vaccines Hives         Medication:   Home Medications           Prior to Admission medications    Medication Sig Start Date End Date Taking?  Authorizing Provider   metoprolol succinate (TOPROL XL) 100 MG extended release tablet TAKE 1 TABLET BY MOUTH EVERY DAY 1/23/20     Cedric Adair MD   lisinopril-hydrochlorothiazide (PRINZIDE;ZESTORETIC) 20-25 MG per tablet TAKE 1 TABLET BY MOUTH TWICE A DAY 1/2/20     Cedric Adair MD   diltiazem (CARDIZEM CD) 360 MG extended release capsule TAKE 1 CAPSULE BY MOUTH EVERY DAY 12/30/19     Cedric Adair MD   magnesium oxide (MAG-OX) 400 MG tablet TAKE 1 TABLET BY MOUTH TWICE A DAY 12/9/19     Danita Ellison MD   digoxin [facility]) 125 MCG tablet Take 1 tablet by mouth daily 12/6/19     Danita Ellison MD   atorvastatin (LIPITOR) 40 MG tablet TAKE 1 TABLET BY MOUTH EVERY DAY 11/25/19     Cedric Adair MD   Multiple Vitamins-Minerals (EYE VITAMINS) CAPS Take 1 tablet by mouth daily       Historical Provider, MD   warfarin (COUMADIN) 5 MG tablet TAKE 1 TABLET BY MOUTH EVERY DAY OR AS DIRECTED BY CLINIC 5/29/19     Cedric Adair MD   isosorbide mononitrate (IMDUR) 30 MG extended release tablet Take 1 tablet by mouth daily 5/9/19     Cedric Adair MD   warfarin (COUMADIN) 2.5 MG tablet Take 2.5 mg by mouth Mon, Tues, Wed, Fri, Sat       Historical Provider, MD   Multiple Vitamins-Minerals (THERAPEUTIC MULTIVITAMIN-MINERALS) tablet Take 1 tablet by mouth daily       Historical Provider, MD [place] Oil 300 MG CAPS Take 350 mg by mouth 2 times daily       Historical Provider, MD   metFORMIN (GLUCOPHAGE) 850 MG tablet Take 1 tablet by mouth 3 times daily 1/3/17     Griselda Cam, APRN - CNP   omeprazole (PRILOSEC OTC) 20 MG tablet Take 1 tablet by mouth daily 1/3/17     Griselda Ron APRN - CNP   LORazepam (ATIVAN) 0.5 MG tablet Take 1 tablet by mouth nightly  Patient taking differently: Take 0.5 mg by mouth 2 times daily. 1/3/17     Griselda Cam, APRN - CNP   calcium-vitamin D (OSCAL-500) 500-200 MG-UNIT per tablet Take 2 tablets by mouth daily       Historical Provider, MD            Social History:   reports that she has been smoking cigarettes. She has a 45.00 pack-year smoking history. She has never used smokeless tobacco. She reports that she does not drink alcohol or use drugs.         Family History:  family history includes Cancer in her father and mother; Coronary Art Dis in her father; Diabetes in her mother; High Blood Pressure in her mother; Other in her maternal grandmother. Reviewed.  Denies family history of sudden cardiac death, arrhythmia, premature CAD     Review of System:     · General ROS: negative for - chills, fever +fatigue  · Psychological ROS: negative for - anxiety or depression  · Ophthalmic ROS: negative for - eye pain or loss of vision  · ENT ROS: negative for - epistaxis, headaches, nasal discharge, sore throat   · Allergy and Immunology ROS: negative for - hives, nasal congestion   · Hematological and Lymphatic ROS: negative for - bleeding problems, blood clots, bruising or jaundice  · Endocrine ROS: negative for - skin changes, temperature intolerance or unexpected weight changes  · Respiratory ROS: negative for - cough, hemoptysis, pleuritic pain, SOB, sputum changes or wheezing  · Cardiovascular ROS: Per HPI. · Gastrointestinal ROS: negative for - abdominal pain, blood in stools, diarrhea, hematemesis, melena, nausea/vomiting or        swallowing difficulty/pain  · Genito-Urinary ROS: negative for - dysuria or incontinence  · Musculoskeletal ROS: negative for - joint swelling or muscle pain  · Neurological ROS: negative for - confusion, dizziness, gait disturbance, headaches, numbness/tingling, seizures, speech      problems, tremors, visual changes or weakness  · Dermatological ROS: negative for - rash     Physical Examination:  There were no vitals filed for this visit.     · Constitutional: Oriented. No distress. · Head: Normocephalic and atraumatic. · Mouth/Throat: Oropharynx is clear and moist.   · Eyes: Conjunctivae normal. EOM are normal.   · Neck: Normal range of motion. Neck supple. No rigidity. No JVD present. · Cardiovascular: regular rate , regular rhythm, S1&S2 and intact distal pulses. · Pulmonary/Chest: Bilateral respiratory sounds. No wheezes. No rhonchi. · Abdominal: Soft. Bowel sounds present. No distension, No tenderness. · Musculoskeletal: No tenderness. No edema    · Lymphadenopathy: Has no cervical adenopathy. · Neurological: Alert and oriented. Cranial nerve appears intact, No Gross deficit   · Skin: Skin is warm and dry. No rash noted. · Psychiatric: Has a normal mood, affect and behavior      Labs: reviewed.      EC20 reviewed, sinus rhythm with v-rate of 72 bpm with QRS duration 86 ms. No pathologic Q waves, ventricular pre-excitation, or QT prolongation.     10/1/18: Afib  v-rate 107  10/15/18: Aflutter v-rate 120  19 Sinus rhythm     Other Non-Invasive Studies:      1. Event monitor:  2019  SR 1 Episode 6-7 beat NSVT.     2. Echo: 17 [facility])  EF 55-60%  Grade II DD  Left atrial cavity is severely dilated     3. Stress Test:    None     4.  Cath: 17  ANGIOGRAPHIC

## 2020-04-25 VITALS
HEART RATE: 83 BPM | OXYGEN SATURATION: 95 % | TEMPERATURE: 98 F | RESPIRATION RATE: 18 BRPM | BODY MASS INDEX: 30.79 KG/M2 | DIASTOLIC BLOOD PRESSURE: 73 MMHG | HEIGHT: 66 IN | SYSTOLIC BLOOD PRESSURE: 150 MMHG | WEIGHT: 191.58 LBS

## 2020-04-25 LAB
GLUCOSE BLD-MCNC: 216 MG/DL (ref 70–99)
PERFORMED ON: ABNORMAL

## 2020-04-25 PROCEDURE — 2580000003 HC RX 258: Performed by: INTERNAL MEDICINE

## 2020-04-25 PROCEDURE — 94761 N-INVAS EAR/PLS OXIMETRY MLT: CPT

## 2020-04-25 PROCEDURE — 6370000000 HC RX 637 (ALT 250 FOR IP): Performed by: INTERNAL MEDICINE

## 2020-04-25 PROCEDURE — 99238 HOSP IP/OBS DSCHRG MGMT 30/<: CPT | Performed by: INTERNAL MEDICINE

## 2020-04-25 RX ORDER — FLECAINIDE ACETATE 50 MG/1
50 TABLET ORAL 2 TIMES DAILY
Qty: 60 TABLET | Refills: 3 | Status: SHIPPED | OUTPATIENT
Start: 2020-04-25 | End: 2020-08-12 | Stop reason: ALTCHOICE

## 2020-04-25 RX ADMIN — PANTOPRAZOLE SODIUM 40 MG: 40 TABLET, DELAYED RELEASE ORAL at 06:15

## 2020-04-25 RX ADMIN — APIXABAN 5 MG: 5 TABLET, FILM COATED ORAL at 08:12

## 2020-04-25 RX ADMIN — Medication 10 ML: at 08:14

## 2020-04-25 RX ADMIN — FLECAINIDE ACETATE 50 MG: 100 TABLET ORAL at 08:13

## 2020-04-25 RX ADMIN — ISOSORBIDE MONONITRATE 30 MG: 30 TABLET, EXTENDED RELEASE ORAL at 08:12

## 2020-04-25 RX ADMIN — LISINOPRIL AND HYDROCHLOROTHIAZIDE 2 TABLET: 12.5; 1 TABLET ORAL at 08:12

## 2020-04-25 RX ADMIN — DILTIAZEM HYDROCHLORIDE 360 MG: 180 CAPSULE, COATED, EXTENDED RELEASE ORAL at 08:13

## 2020-04-25 RX ADMIN — METOPROLOL SUCCINATE 100 MG: 50 TABLET, EXTENDED RELEASE ORAL at 08:13

## 2020-04-25 RX ADMIN — METFORMIN HYDROCHLORIDE 850 MG: 850 TABLET ORAL at 08:12

## 2020-04-25 ASSESSMENT — PAIN SCALES - GENERAL
PAINLEVEL_OUTOF10: 0

## 2020-04-25 NOTE — PROGRESS NOTES
2000: Shift assessment complete. A/O x4 VSS on RA. NSR with occasional PVCs on monitor. Denies pain. R groin soft and intact with figure of 8 suture in place. Ambulates independently with steady gait. Up to restroom and then walked 450 feet in hallway. Tolerated activity well.   2200: Lunch meat sandwich given to pt. Denies other needs. 0000: Reassessment unchanged. Did apply 2L NC due to SPO2 87%  0200: Sleeping, non s/s of distress  0400: No changes in reassessment. Up to restroom. Tolerated well. 1017: Figure of 8 suture removed- no bleeding noted. Tegaderm applied. Pt educated on puncture site care, prevention of infection, and risk of bleeding.

## 2020-04-25 NOTE — PLAN OF CARE
Problem: Falls - Risk of:  Goal: Will remain free from falls  Description: Will remain free from falls  Outcome: Ongoing  Note: Ambulates with steady gait. Non-skid socks on. Call light and personal belongings in reach. Problem: Bleeding:  Goal: Will show no signs and symptoms of excessive bleeding  Description: Will show no signs and symptoms of excessive bleeding  Outcome: Ongoing  Note: Visually assessed skin for hematomas, gross blood, swelling, and ecchymosis. Hemorrhage precautions in place. Vitals stable.

## 2020-04-25 NOTE — DISCHARGE SUMMARY
Component Value Date    WBC 12.6 (H) 04/20/2020    HGB 13.5 04/20/2020    HCT 40.4 04/20/2020    MCV 85.5 04/20/2020     04/20/2020      Lab Results   Component Value Date    INR 4.43 (H) 03/20/2019    PROTIME 50.5 (H) 03/20/2019    No results found for: BNP      Disposition: home    Patient Instructions:    José Essex   Home Medication Instructions EKF:[id number]    Printed on:04/25/20 0805   Medication Information                      atorvastatin (LIPITOR) 40 MG tablet  TAKE 1 TABLET BY MOUTH EVERY DAY             calcium-vitamin D (OSCAL-500) 500-200 MG-UNIT per tablet  Take 2 tablets by mouth daily             diltiazem (CARDIZEM CD) 360 MG extended release capsule  TAKE 1 CAPSULE BY MOUTH EVERY DAY             ELIQUIS 5 MG TABS tablet  TAKE 1 TABLET BY MOUTH TWICE A DAY             flecainide (TAMBOCOR) 50 MG tablet  Take 1 tablet by mouth 2 times daily             isosorbide mononitrate (IMDUR) 30 MG extended release tablet  Take 1 tablet by mouth daily             Krill Oil 300 MG CAPS  Take 350 mg by mouth 2 times daily             lisinopril-hydrochlorothiazide (PRINZIDE;ZESTORETIC) 20-25 MG per tablet  TAKE 1 TABLET BY MOUTH TWICE A DAY             LORazepam (ATIVAN) 0.5 MG tablet  Take 1 tablet by mouth nightly             magnesium oxide (MAG-OX) 400 MG tablet  TAKE 1 TABLET BY MOUTH TWICE A DAY             metFORMIN (GLUCOPHAGE) 850 MG tablet  Take 1 tablet by mouth 3 times daily             metoprolol succinate (TOPROL XL) 100 MG extended release tablet  TAKE 1 TABLET BY MOUTH EVERY DAY             Multiple Vitamins-Minerals (EYE VITAMINS) CAPS  Take 1 tablet by mouth daily             Multiple Vitamins-Minerals (THERAPEUTIC MULTIVITAMIN-MINERALS) tablet  Take 1 tablet by mouth daily             omeprazole (PRILOSEC OTC) 20 MG tablet  Take 1 tablet by mouth daily               Current Discharge Medication List      CONTINUE these medications which have CHANGED    Details   flecainide (TAMBOCOR) 50 MG tablet Take 1 tablet by mouth 2 times daily  Qty: 60 tablet, Refills: 3         CONTINUE these medications which have NOT CHANGED    Details   isosorbide mononitrate (IMDUR) 30 MG extended release tablet Take 1 tablet by mouth daily  Qty: 90 tablet, Refills: 3      ELIQUIS 5 MG TABS tablet TAKE 1 TABLET BY MOUTH TWICE A DAY  Qty: 180 tablet, Refills: 3      metoprolol succinate (TOPROL XL) 100 MG extended release tablet TAKE 1 TABLET BY MOUTH EVERY DAY  Qty: 90 tablet, Refills: 3      lisinopril-hydrochlorothiazide (PRINZIDE;ZESTORETIC) 20-25 MG per tablet TAKE 1 TABLET BY MOUTH TWICE A DAY  Qty: 180 tablet, Refills: 3    Associated Diagnoses: Essential hypertension      diltiazem (CARDIZEM CD) 360 MG extended release capsule TAKE 1 CAPSULE BY MOUTH EVERY DAY  Qty: 90 capsule, Refills: 3      magnesium oxide (MAG-OX) 400 MG tablet TAKE 1 TABLET BY MOUTH TWICE A DAY  Qty: 60 tablet, Refills: 5      atorvastatin (LIPITOR) 40 MG tablet TAKE 1 TABLET BY MOUTH EVERY DAY  Qty: 90 tablet, Refills: 3      Multiple Vitamins-Minerals (EYE VITAMINS) CAPS Take 1 tablet by mouth daily      Multiple Vitamins-Minerals (THERAPEUTIC MULTIVITAMIN-MINERALS) tablet Take 1 tablet by mouth daily      Krill Oil 300 MG CAPS Take 350 mg by mouth 2 times daily      metFORMIN (GLUCOPHAGE) 850 MG tablet Take 1 tablet by mouth 3 times daily  Qty: 90 tablet, Refills: 2    Associated Diagnoses: Type 2 diabetes mellitus without complication, without long-term current use of insulin (HCC)      omeprazole (PRILOSEC OTC) 20 MG tablet Take 1 tablet by mouth daily  Qty: 30 tablet, Refills: 2    Associated Diagnoses: Gastroesophageal reflux disease without esophagitis      LORazepam (ATIVAN) 0.5 MG tablet Take 1 tablet by mouth nightly  Qty: 30 tablet, Refills: 2      calcium-vitamin D (OSCAL-500) 500-200 MG-UNIT per tablet Take 2 tablets by mouth daily             Follow-up with Dr. Pb Oakley in 3 months.     Signed:    Ned Cuadra MD,

## 2020-05-29 ENCOUNTER — NURSE ONLY (OUTPATIENT)
Dept: CARDIOLOGY CLINIC | Age: 70
End: 2020-05-29
Payer: MEDICARE

## 2020-05-29 ENCOUNTER — OFFICE VISIT (OUTPATIENT)
Dept: PRIMARY CARE CLINIC | Age: 70
End: 2020-05-29

## 2020-05-29 ENCOUNTER — TELEPHONE (OUTPATIENT)
Dept: CARDIOLOGY CLINIC | Age: 70
End: 2020-05-29

## 2020-05-29 VITALS — DIASTOLIC BLOOD PRESSURE: 70 MMHG | SYSTOLIC BLOOD PRESSURE: 110 MMHG

## 2020-05-29 PROCEDURE — 93000 ELECTROCARDIOGRAM COMPLETE: CPT | Performed by: INTERNAL MEDICINE

## 2020-05-29 RX ORDER — DIGOXIN 125 MCG
125 TABLET ORAL DAILY
Qty: 30 TABLET | Refills: 3
Start: 2020-05-29 | End: 2020-06-02 | Stop reason: HOSPADM

## 2020-05-29 NOTE — PROGRESS NOTES
Patient called in and script for Dig sent in. MELVIN Lora had pending as patient does not in fact have at home.

## 2020-05-29 NOTE — TELEPHONE ENCOUNTER
Pt calling stating that her heart is racing again, she is SOB, had an ablation done 4/24. She is very concerned on the phone, she said her daughter thinks it is her nerves but she is wondering if her a fib is back. I asked her to check her pulse and she said she can't find it.   She was very nervous on the phone, I told her I would send a message to dr. Nga Amaya

## 2020-05-30 LAB — SARS-COV-2, PCR: NOT DETECTED

## 2020-06-01 DIAGNOSIS — I48.0 PAROXYSMAL ATRIAL FIBRILLATION (HCC): ICD-10-CM

## 2020-06-01 LAB
ANION GAP SERPL CALCULATED.3IONS-SCNC: 17 MMOL/L (ref 3–16)
BUN BLDV-MCNC: 15 MG/DL (ref 7–20)
CALCIUM SERPL-MCNC: 9.5 MG/DL (ref 8.3–10.6)
CHLORIDE BLD-SCNC: 88 MMOL/L (ref 99–110)
CO2: 28 MMOL/L (ref 21–32)
CREAT SERPL-MCNC: 0.6 MG/DL (ref 0.6–1.2)
GFR AFRICAN AMERICAN: >60
GFR NON-AFRICAN AMERICAN: >60
GLUCOSE BLD-MCNC: 259 MG/DL (ref 70–99)
HCT VFR BLD CALC: 41.2 % (ref 36–48)
HEMOGLOBIN: 13.5 G/DL (ref 12–16)
MCH RBC QN AUTO: 27.6 PG (ref 26–34)
MCHC RBC AUTO-ENTMCNC: 32.8 G/DL (ref 31–36)
MCV RBC AUTO: 84.3 FL (ref 80–100)
PDW BLD-RTO: 15.5 % (ref 12.4–15.4)
PLATELET # BLD: 241 K/UL (ref 135–450)
PMV BLD AUTO: 10.8 FL (ref 5–10.5)
POTASSIUM SERPL-SCNC: 3.8 MMOL/L (ref 3.5–5.1)
RBC # BLD: 4.89 M/UL (ref 4–5.2)
SODIUM BLD-SCNC: 133 MMOL/L (ref 136–145)
WBC # BLD: 9.4 K/UL (ref 4–11)

## 2020-06-02 ENCOUNTER — HOSPITAL ENCOUNTER (OUTPATIENT)
Dept: CARDIAC CATH/INVASIVE PROCEDURES | Age: 70
Discharge: HOME | End: 2020-06-02
Payer: MEDICARE

## 2020-06-02 VITALS
OXYGEN SATURATION: 97 % | RESPIRATION RATE: 23 BRPM | BODY MASS INDEX: 29.32 KG/M2 | HEART RATE: 163 BPM | TEMPERATURE: 98.6 F | DIASTOLIC BLOOD PRESSURE: 95 MMHG | SYSTOLIC BLOOD PRESSURE: 135 MMHG | WEIGHT: 182.4 LBS | HEIGHT: 66 IN

## 2020-06-02 LAB
EKG ATRIAL RATE: 107 BPM
EKG DIAGNOSIS: NORMAL
EKG P AXIS: 64 DEGREES
EKG P-R INTERVAL: 172 MS
EKG Q-T INTERVAL: 356 MS
EKG QRS DURATION: 76 MS
EKG QTC CALCULATION (BAZETT): 475 MS
EKG R AXIS: 3 DEGREES
EKG T AXIS: 71 DEGREES
EKG VENTRICULAR RATE: 107 BPM

## 2020-06-02 PROCEDURE — 92960 CARDIOVERSION ELECTRIC EXT: CPT | Performed by: FAMILY MEDICINE

## 2020-06-02 PROCEDURE — 2500000003 HC RX 250 WO HCPCS

## 2020-06-02 PROCEDURE — 93010 ELECTROCARDIOGRAM REPORT: CPT | Performed by: INTERNAL MEDICINE

## 2020-06-02 PROCEDURE — 2580000003 HC RX 258

## 2020-06-02 PROCEDURE — 99152 MOD SED SAME PHYS/QHP 5/>YRS: CPT | Performed by: INTERNAL MEDICINE

## 2020-06-02 PROCEDURE — 93005 ELECTROCARDIOGRAM TRACING: CPT | Performed by: INTERNAL MEDICINE

## 2020-06-02 PROCEDURE — 92960 CARDIOVERSION ELECTRIC EXT: CPT | Performed by: INTERNAL MEDICINE

## 2020-06-02 RX ORDER — SODIUM CHLORIDE 9 MG/ML
INJECTION, SOLUTION INTRAVENOUS CONTINUOUS
Status: DISCONTINUED | OUTPATIENT
Start: 2020-06-02 | End: 2020-06-03 | Stop reason: HOSPADM

## 2020-06-02 RX ORDER — SODIUM CHLORIDE 0.9 % (FLUSH) 0.9 %
10 SYRINGE (ML) INJECTION PRN
Status: DISCONTINUED | OUTPATIENT
Start: 2020-06-02 | End: 2020-06-02 | Stop reason: ALTCHOICE

## 2020-06-02 RX ORDER — SODIUM CHLORIDE 0.9 % (FLUSH) 0.9 %
10 SYRINGE (ML) INJECTION EVERY 12 HOURS SCHEDULED
Status: DISCONTINUED | OUTPATIENT
Start: 2020-06-02 | End: 2020-06-02 | Stop reason: ALTCHOICE

## 2020-06-02 RX ORDER — SODIUM CHLORIDE 0.9 % (FLUSH) 0.9 %
10 SYRINGE (ML) INJECTION EVERY 12 HOURS SCHEDULED
Status: DISCONTINUED | OUTPATIENT
Start: 2020-06-02 | End: 2020-06-03 | Stop reason: HOSPADM

## 2020-06-02 RX ORDER — SODIUM CHLORIDE 0.9 % (FLUSH) 0.9 %
10 SYRINGE (ML) INJECTION PRN
Status: DISCONTINUED | OUTPATIENT
Start: 2020-06-02 | End: 2020-06-03 | Stop reason: HOSPADM

## 2020-06-02 NOTE — PRE SEDATION
(TOPROL XL) 100 MG extended release tablet TAKE 1 TABLET BY MOUTH EVERY DAY 1/23/20   Karley Conde MD   lisinopril-hydrochlorothiazide (PRINZIDE;ZESTORETIC) 20-25 MG per tablet TAKE 1 TABLET BY MOUTH TWICE A DAY 1/2/20   Karley Conde MD   diltiazem (CARDIZEM CD) 360 MG extended release capsule TAKE 1 CAPSULE BY MOUTH EVERY DAY 12/30/19   Karley Conde MD   magnesium oxide (MAG-OX) 400 MG tablet TAKE 1 TABLET BY MOUTH TWICE A DAY 12/9/19   Misael Dubon MD   atorvastatin (LIPITOR) 40 MG tablet TAKE 1 TABLET BY MOUTH EVERY DAY 11/25/19   Karley Conde MD   Multiple Vitamins-Minerals (EYE VITAMINS) CAPS Take 1 tablet by mouth daily    Historical Provider, MD   Multiple Vitamins-Minerals (THERAPEUTIC MULTIVITAMIN-MINERALS) tablet Take 1 tablet by mouth daily    Historical Provider, MD [place] Oil 300 MG CAPS Take 350 mg by mouth 2 times daily    Historical Provider, MD   metFORMIN (GLUCOPHAGE) 850 MG tablet Take 1 tablet by mouth 3 times daily 1/3/17   OSWALD Ambrosio CNP   omeprazole (PRILOSEC OTC) 20 MG tablet Take 1 tablet by mouth daily 1/3/17   OSWALD Ambrosio CNP   LORazepam (ATIVAN) 0.5 MG tablet Take 1 tablet by mouth nightly  Patient taking differently: Take 0.5 mg by mouth 2 times daily. 1/3/17   OSWALD Ambrosio CNP   calcium-vitamin D (OSCAL-500) 500-200 MG-UNIT per tablet Take 2 tablets by mouth daily    Historical Provider, MD     Coumadin Use Last 7 Days:  no  Antiplatelet drug therapy use last 7 days: no  Other anticoagulant use last 7 days: yes - Eliquis  Additional Medication Information:  n/a      Pre-Sedation Documentation and Exam:   I have personally completed a history, physical exam & review of systems for this patient (see notes).     Mallampati Airway Assessment:  Mallampati Class I - (soft palate, fauces, uvula & anterior/posterior tonsillar pillars are visible)    Prior History of Anesthesia Complications:   none    ASA

## 2020-06-02 NOTE — H&P
[facility]   Electrophysiology Consultation   Date: 6/2/2020  Reason for Consultation: Jarrett Masterson MD  Primary Care Physician: Elissa Mascorro MD     Chief Complaint:   No chief complaint on file.        HPI: Kimberli Tobin is a 69 y. o. with a PMH significant for NSTEMI (2017), HLD, hypothyroidism, HTN, DM and PAF first diagnosed in 2017 in the setting of acute coronary syndrome who arrived to [facility] 9/4/18 with complaints of palpitations and fatigue. She was found to be back in AFib RVR and her beta blocker therapy was increased for tighter rate control. She was scheduled for a DCCV but spontaneously converted on her own prior to procedure. During office follow up visit on 10/1/19 EKG confirmed she was back in afib/aflutter. She subsequently underwent successful cardioversion on 10/4/19 . She underwent right atrial flutter ablation on 10/30/18 and and atrial fibrillation ablation on 3/19/19. She is typically followed by Dr. Vanita Locke for her cardiac needs. She presented to [facility] ED on 1/7/2020 at the instruction of her primary cardiologist  Duy Gallo MD after calling into his office reporting high heart rates and palpitations.      Interval History:  Today, she is here for hospital follow up. She was seen at [facility] on 1/7/2020 and was found to be in rapid atrial flutter. She states she has been feeling much better since she was restarted on Flecainide. She is compliant with her medications and tolerating them well. She denies chest pain/pressure, tightness, edema, shortness of breath, heart racing, palpitations, lightheadedness, dizziness, syncope, presyncope,  PND or orthopnea.      Past Medical History           Past Medical History:   Diagnosis Date    Atrial fibrillation ([address]      CAD (coronary artery disease)       NSTEMI 1/201-->Echo LVEF normal, grade II diastolic dysfx, LAE. Cath 1/27/17 70-75% LAD, FFR LAD 0.80, 50% PLD- Med tx.      GERD hemoptysis, pleuritic pain, SOB, sputum changes or wheezing  · Cardiovascular ROS: Per HPI. · Gastrointestinal ROS: negative for - abdominal pain, blood in stools, diarrhea, hematemesis, melena, nausea/vomiting or        swallowing difficulty/pain  · Genito-Urinary ROS: negative for - dysuria or incontinence  · Musculoskeletal ROS: negative for - joint swelling or muscle pain  · Neurological ROS: negative for - confusion, dizziness, gait disturbance, headaches, numbness/tingling, seizures, speech      problems, tremors, visual changes or weakness  · Dermatological ROS: negative for - rash     Physical Examination:  There were no vitals filed for this visit.     · Constitutional: Oriented. No distress. · Head: Normocephalic and atraumatic. · Mouth/Throat: Oropharynx is clear and moist.   · Eyes: Conjunctivae normal. EOM are normal.   · Neck: Normal range of motion. Neck supple. No rigidity.  No JVD present. · Cardiovascular: regular rate , regular rhythm, S1&S2 and intact distal pulses. · Pulmonary/Chest: Bilateral respiratory sounds. No wheezes. No rhonchi.    · Abdominal: Soft. Bowel sounds present. No distension, No tenderness. · Musculoskeletal: No tenderness. No edema    · Lymphadenopathy: Has no cervical adenopathy. · Neurological: Alert and oriented. Cranial nerve appears intact, No Gross deficit   · Skin: Skin is warm and dry. No rash noted. · Psychiatric: Has a normal mood, affect and behavior      Labs: reviewed.      EC20 reviewed, sinus rhythm with v-rate of 72 bpm with QRS duration 86 ms. No pathologic Q waves, ventricular pre-excitation, or QT prolongation.     10/1/18: Afib  v-rate 107  10/15/18: Aflutter v-rate 120  19 Sinus rhythm     Other Non-Invasive Studies:      1. Event monitor:  2019  SR 1 Episode 6-7 beat NSVT.     2. Echo: 17 [facility])  EF 55-60%  Grade II DD  Left atrial cavity is severely dilated     3. Stress Test:    None     4.  Cath: 17  ANGIOGRAPHIC FINDINGS:  1.  The left main coronary artery comes from the left coronary cusp with  normal AYDIN 3 flow. 2.  The left anterior descending artery comes from the left main coronary  artery.  The mid portion has approximately 70% stenosis with a fractional flow  reserve of 0.80. 3.  The left circumflex comes from the left main coronary.  It is small in  caliber and has mild diffuse disease and AYDIN 3 flow. 4.  The right coronary artery comes from the right coronary cusp.  It is a  dominant vessel, giving rise to the posterior descending artery and  posterolateral branch.  Mid portion has 50% stenosis. 5.  LV ejection fraction of 60%, LVEDP of 14 mmHg.     In summary, moderate left anterior descending artery stenosis of 70% to 75%  severity with a fractional flow reserve of 0.80.      Procedures:  1. Successful DCCV 10/4/18   2. Right Atrial flutter ablation 10/30/18  3.  Atrial fibrillation ablation 3/19/19     Assessment:           Patient Active Problem List     Diagnosis Date Noted    S/P ablation of atrial fibrillation 03/19/2019    Persistent atrial fibrillation 03/19/2019    Atrial fibrillation ([facility] 01/10/2019    S/P ablation of atrial flutter 10/30/2018    Right atrial flutter by electrocardiogram ([facility] 10/30/2018    Atrial fibrillation with RVR ([facility] 09/04/2018    Long term current use of amiodarone 04/20/2018    CAD (coronary artery disease)      HLD (hyperlipidemia)      Essential hypertension      Paroxysmal atrial fibrillation ([address] 01/28/2017    NSTEMI (non-ST elevated myocardial infarction) ([facility] 01/27/2017    Diabetes mellitus due to underlying condition with mild nonproliferative diabetic retinopathy without macular edema ([facility] 10/12/2015    Type 2 diabetes mellitus without complication, without long-term current use of insulin (HCC)      High triglycerides      Hypertension      Menopause      Osteoarthritis           Plan:     Right atrial flutter  -s/p Right atrial flutter

## 2020-07-06 RX ORDER — METOPROLOL SUCCINATE 100 MG/1
TABLET, EXTENDED RELEASE ORAL
Qty: 90 TABLET | Refills: 3 | Status: SHIPPED | OUTPATIENT
Start: 2020-07-06

## 2020-07-24 ENCOUNTER — TELEPHONE (OUTPATIENT)
Dept: CARDIOLOGY CLINIC | Age: 70
End: 2020-07-24

## 2020-07-24 NOTE — TELEPHONE ENCOUNTER
Pt bstates she rather take the medication for her UTI and was wondering what she can do for the interaction

## 2020-07-24 NOTE — TELEPHONE ENCOUNTER
Pt called and said she has a UTI per her PCP, They prescribed CIPRO, the pharmacist said it would react with her FLECAINIDE. I asked her if she could call her PCP back for a different script. She wanted to start the CIPRO because she is so uncomfortable. She would like to speak to a nurse.     Yariel Coma # [phone]

## 2020-08-12 ENCOUNTER — OFFICE VISIT (OUTPATIENT)
Dept: CARDIOLOGY CLINIC | Age: 70
End: 2020-08-12
Payer: MEDICARE

## 2020-08-12 VITALS
TEMPERATURE: 98.2 F | SYSTOLIC BLOOD PRESSURE: 140 MMHG | HEIGHT: 66 IN | BODY MASS INDEX: 27.64 KG/M2 | HEART RATE: 85 BPM | WEIGHT: 172 LBS | DIASTOLIC BLOOD PRESSURE: 70 MMHG | OXYGEN SATURATION: 96 %

## 2020-08-12 PROCEDURE — 4040F PNEUMOC VAC/ADMIN/RCVD: CPT | Performed by: INTERNAL MEDICINE

## 2020-08-12 PROCEDURE — 93000 ELECTROCARDIOGRAM COMPLETE: CPT | Performed by: INTERNAL MEDICINE

## 2020-08-12 PROCEDURE — 1123F ACP DISCUSS/DSCN MKR DOCD: CPT | Performed by: INTERNAL MEDICINE

## 2020-08-12 PROCEDURE — 1090F PRES/ABSN URINE INCON ASSESS: CPT | Performed by: INTERNAL MEDICINE

## 2020-08-12 PROCEDURE — G8427 DOCREV CUR MEDS BY ELIG CLIN: HCPCS | Performed by: INTERNAL MEDICINE

## 2020-08-12 PROCEDURE — 3017F COLORECTAL CA SCREEN DOC REV: CPT | Performed by: INTERNAL MEDICINE

## 2020-08-12 PROCEDURE — 4004F PT TOBACCO SCREEN RCVD TLK: CPT | Performed by: INTERNAL MEDICINE

## 2020-08-12 PROCEDURE — G8417 CALC BMI ABV UP PARAM F/U: HCPCS | Performed by: INTERNAL MEDICINE

## 2020-08-12 PROCEDURE — 99214 OFFICE O/P EST MOD 30 MIN: CPT | Performed by: INTERNAL MEDICINE

## 2020-08-12 PROCEDURE — G8400 PT W/DXA NO RESULTS DOC: HCPCS | Performed by: INTERNAL MEDICINE

## 2020-08-12 NOTE — PATIENT INSTRUCTIONS
Patient Education        Heart-Healthy Diet: Care Instructions  Your Care Instructions     A heart-healthy diet has lots of vegetables, fruits, nuts, beans, and whole grains, and is low in salt. It limits foods that are high in saturated fat, such as meats, cheeses, and fried foods. It may be hard to change your diet, but even small changes can lower your risk of heart attack and heart disease. Follow-up care is a key part of your treatment and safety. Be sure to make and go to all appointments, and call your doctor if you are having problems. It's also a good idea to know your test results and keep a list of the medicines you take. How can you care for yourself at home? Watch your portions  · Learn what a serving is. A \"serving\" and a \"portion\" are not always the same thing. Make sure that you are not eating larger portions than are recommended. For example, a serving of pasta is ½ cup. A serving size of meat is 2 to 3 ounces. A 3-ounce serving is about the size of a deck of cards. Measure serving sizes until you are good at [place]" them. Keep in mind that restaurants often serve portions that are 2 or 3 times the size of one serving. · To keep your energy level up and keep you from feeling hungry, eat often but in smaller portions. · Eat only the number of calories you need to stay at a healthy weight. If you need to lose weight, eat fewer calories than your body burns (through exercise and other physical activity). Eat more fruits and vegetables  · Eat a variety of fruit and vegetables every day. Dark green, deep orange, red, or yellow fruits and vegetables are especially good for you. Examples include spinach, carrots, peaches, and berries. · Keep carrots, celery, and other veggies handy for snacks. Buy fruit that is in season and store it where you can see it so that you will be tempted to eat it. · Cook dishes that have a lot of veggies in them, such as stir-fries and soups.   Limit saturated and trans fat  · Read food labels, and try to avoid saturated and trans fats. They increase your risk of heart disease. · Use olive or canola oil when you cook. · Bake, broil, grill, or steam foods instead of frying them. · Choose lean meats instead of high-fat meats such as hot dogs and sausages. Cut off all visible fat when you prepare meat. · Eat fish, skinless poultry, and meat alternatives such as soy products instead of high-fat meats. Soy products, such as tofu, may be especially good for your heart. · Choose low-fat or fat-free milk and dairy products. Eat foods high in fiber  · Eat a variety of grain products every day. Include whole-grain foods that have lots of fiber and nutrients. Examples of whole-grain foods include oats, whole wheat bread, and brown rice. · Buy whole-grain breads and cereals, instead of white bread or pastries. Limit salt and sodium  · Limit how much salt and sodium you eat to help lower your blood pressure. · Taste food before you salt it. Add only a little salt when you think you need it. With time, your taste buds will adjust to less salt. · Eat fewer snack items, fast foods, and other high-salt, processed foods. Check food labels for the amount of sodium in packaged foods. · Choose low-sodium versions of canned goods (such as soups, vegetables, and beans). Limit sugar  · Limit drinks and foods with added sugar. These include candy, desserts, and soda pop. Limit alcohol  · Limit alcohol to no more than 2 drinks a day for men and 1 drink a day for women. Too much alcohol can cause health problems. When should you call for help? Watch closely for changes in your health, and be sure to contact your doctor if:  · You would like help planning heart-healthy meals. Where can you learn more? Go to [email or web address] org and sign in to your [employer] account.  Enter V137 in the [employer] box to learn more about \"Heart-Healthy Diet: Care Instructions. \"     If you do not have an account, please click on the \"Sign Up Now\" link. Current as of: August 22, 2019               Content Version: 12.5  © 2182-9198 Healthwise, Incorporated. Care instructions adapted under license by [facility] ([facility]). If you have questions about a medical condition or this instruction, always ask your healthcare professional. [address] any warranty or liability for your use of this information.

## 2020-08-12 NOTE — PROGRESS NOTES
[facility]   Electrophysiology Consultation   Date: 8/12/2020  Reason for Consultation: Afib  Consult Requesting Physician: Sara Hoskins MD  Primary Care Physician: Kevin Ac MD    Chief Complaint:   Chief Complaint   Patient presents with    Follow-up     afib - s/p ablation    Shortness of Breath                                     HPI: Luigi Perez is a 69 y.o. with a PMH significant for NSTEMI (2017), HLD, hypothyroidism, HTN, DM and PAF first diagnosed in 2017 in the setting of acute coronary syndrome who arrived to [facility] 9/4/18 with complaints of palpitations and fatigue. She was found to be back in AFib RVR and her beta blocker therapy was increased for tighter rate control. She was scheduled for a DCCV but spontaneously converted on her own prior to procedure. During office follow up visit on 10/1/19 EKG confirmed she was back in afib/aflutter. She subsequently underwent successful cardioversion on 10/4/19 . She underwent right atrial flutter ablation on 10/30/18 and and atrial fibrillation ablation on 3/19/19. She is typically followed by Dr. Pricila Rodriguez for her cardiac needs. She presented to [facility] ED on 1/7/2020 at the instruction of her primary cardiologist  Sara Hoskins MD after calling into his office reporting high heart rates and palpitations. Interval History: Today, she is here s/p cardioversion on 6/2/2020. She reports that she does feel irregular rhythms and some lasting up to an hour. She has been trying to adhere to a low sodium diet. Patient denies exertional chest pain/pressure, dyspnea at rest, LAWSON, PND, orthopnea, lightheadedness, weight changes, changes in LE edema, and syncope. Patient reports compliance to her medications. Past Medical History:   Diagnosis Date    Atrial fibrillation ([address]     CAD (coronary artery disease)     NSTEMI 1/201-->Echo LVEF normal, grade II diastolic dysfx, LAE. Cath 1/27/17 70-75% LAD, FFR LAD 0.80, 50% PLD- Med tx.      GERD (gastroesophageal reflux disease)     High triglycerides     HLD (hyperlipidemia)     Hypertension     Menopause     Osteoarthritis     Stress incontinence     Thyroid disease     Type II or unspecified type diabetes mellitus without mention of complication, not stated as uncontrolled         Past Surgical History:   Procedure Laterality Date    APPENDECTOMY  1953    ATRIAL ABLATION SURGERY N/A 10/30/2018    BRITTANY Atrial Flutter Ablation    CHOLECYSTECTOMY  9/2006    COLONOSCOPY      FACIAL COSMETIC SURGERY  1965    TONSILLECTOMY  1952    TUBAL LIGATION         Allergies: Allergies   Allergen Reactions    Influenza Vaccines Hives       Medication:   Prior to Admission medications    Medication Sig Start Date End Date Taking?  Authorizing Provider   metoprolol succinate (TOPROL XL) 100 MG extended release tablet TAKE 1 TABLET BY MOUTH EVERY DAY 7/6/20  Yes Glory English MD   flecainide (TAMBOCOR) 50 MG tablet Take 1 tablet by mouth 2 times daily 4/25/20  Yes Zenaida Gaines MD   isosorbide mononitrate (IMDUR) 30 MG extended release tablet Take 1 tablet by mouth daily 2/27/20  Yes Juan C Bello MD   ELIQUIS 5 MG TABS tablet TAKE 1 TABLET BY MOUTH TWICE A DAY 1/30/20  Yes Glory English MD   lisinopril-hydrochlorothiazide (PRINZIDE;ZESTORETIC) 20-25 MG per tablet TAKE 1 TABLET BY MOUTH TWICE A DAY 1/2/20  Yes Glory English MD   diltiazem (CARDIZEM CD) 360 MG extended release capsule TAKE 1 CAPSULE BY MOUTH EVERY DAY 12/30/19  Yes Glory English MD   magnesium oxide (MAG-OX) 400 MG tablet TAKE 1 TABLET BY MOUTH TWICE A DAY 12/9/19  Yes Zenaida Gaines MD   atorvastatin (LIPITOR) 40 MG tablet TAKE 1 TABLET BY MOUTH EVERY DAY 11/25/19  Yes Gloyr English MD   Multiple Vitamins-Minerals (EYE VITAMINS) CAPS Take 1 tablet by mouth daily   Yes Historical Provider, MD   Multiple Vitamins-Minerals (THERAPEUTIC MULTIVITAMIN-MINERALS) tablet Take 1 tablet by mouth daily   Yes Historical Provider, MD difficulty/pain  · Genito-Urinary ROS: negative for - dysuria or incontinence  · Musculoskeletal ROS: negative for - joint swelling or muscle pain  · Neurological ROS: negative for - confusion, dizziness, gait disturbance, headaches, numbness/tingling, seizures, speech  problems, tremors, visual changes or weakness  · Dermatological ROS: negative for - rash    Physical Examination:  Vitals:    20 1045   BP: (!) 140/70   Pulse:    Temp:    SpO2:        · Constitutional: Oriented. No distress. · Head: Normocephalic and atraumatic. · Mouth/Throat: Oropharynx is clear and moist.   · Eyes: Conjunctivae normal. EOM are normal.   · Neck: Normal range of motion. Neck supple. No rigidity. No JVD present. · Cardiovascular: regular rate , regular rhythm, S1&S2 and intact distal pulses. · Pulmonary/Chest: Bilateral respiratory sounds. No wheezes. No rhonchi. · Abdominal: Soft. Bowel sounds present. No distension, No tenderness. · Musculoskeletal: No tenderness. No edema    · Lymphadenopathy: Has no cervical adenopathy. · Neurological: Alert and oriented. Cranial nerve appears intact, No Gross deficit   · Skin: Skin is warm and dry. No rash noted. · Psychiatric: Has a normal mood, affect and behavior     Labs: reviewed. EC20 reviewed, sinus rhythm with v-rate of 72 bpm with QRS duration 86 ms. No pathologic Q waves, ventricular pre-excitation, or QT prolongation. 10/1/18: Afib  v-rate 107  10/15/18: Aflutter v-rate 120  19 Sinus rhythm  2020 Bigeminy     Other Non-Invasive Studies:     1. Event monitor:  2019  SR 1 Episode 6-7 beat NSVT. 2. Echo: 17 [facility])  EF 55-60%  Grade II DD  Left atrial cavity is severely dilated    3. Stress Test:    None    4. Cath: 17  ANGIOGRAPHIC FINDINGS:  1. The left main coronary artery comes from the left coronary cusp with  normal AYDIN 3 flow. 2.  The left anterior descending artery comes from the left main coronary  artery.   The mid portion has approximately 70% stenosis with a fractional flow  reserve of 0.80. 3. The left circumflex comes from the left main coronary. It is small in  caliber and has mild diffuse disease and AYDIN 3 flow. 4.  The right coronary artery comes from the right coronary cusp. It is a  dominant vessel, giving rise to the posterior descending artery and  posterolateral branch. Mid portion has 50% stenosis. 5.  LV ejection fraction of 60%, LVEDP of 14 mmHg.     In summary, moderate left anterior descending artery stenosis of 70% to 75%  severity with a fractional flow reserve of 0.80. Procedures:  1. Successful DCCV 10/4/18   2. Right Atrial flutter ablation 10/30/18  3. Atrial fibrillation ablation 3/19/19  4.  Successful DCCV 6/2/2020    Assessment:   Patient Active Problem List    Diagnosis Date Noted    Atrial arrhythmia 04/24/2020    Left atrial flutter by electrocardiogram ([facility] 04/24/2020    S/P ablation of atrial fibrillation 03/19/2019    Persistent atrial fibrillation 03/19/2019    Atrial fibrillation ([address] 01/10/2019    S/P ablation of atrial flutter 10/30/2018    Right atrial flutter by electrocardiogram ([address] 10/30/2018    Atrial fibrillation with RVR ([address] 09/04/2018    Long term current use of amiodarone 04/20/2018    CAD (coronary artery disease)     HLD (hyperlipidemia)     Essential hypertension     Paroxysmal atrial fibrillation ([address] 01/28/2017    NSTEMI (non-ST elevated myocardial infarction) ([address] 01/27/2017    Diabetes mellitus due to underlying condition with mild nonproliferative diabetic retinopathy without macular edema ([address] 10/12/2015    Type 2 diabetes mellitus without complication, without long-term current use of insulin (HCC)     High triglycerides     Hypertension     Menopause     Osteoarthritis         Plan:    Right atrial flutter  -s/p Right atrial flutter ablation 10/30/18  -EKG today confirms bigeminy     Atrial fibrillation / Left atrial flutter  -EKG today confirms bigeminy   -S/p Atrial fibrillation on  3/19/2019   -[id number] Score 6 (CHF, HTN, DM, AGE, female gender)  -On Eliquis 5 mg BID for  thromboembolic risk reduction.  -Tolerating well no signs or symptoms of abnormal bruising or bleeding.  -She is currently on Flecainide 100 mg BID for rhythm control.  -On Toprol  mg daily & Cardizem  mg daily for rate control of afib, anti-remodeling and blood pressure control. -S/p left atrial flutter ablation on 4/24/2020.  -S/p cardioversion on 6/2/2020.  -Will place 30 day event monitor to assess atrial fibrillation burden.   -Discontinue flecainide. Follow up in 6 weeks. Thank you for allowing me to participate in the care of Lori Biswas. All questions and concerns were addressed to the patient/family. Alternatives to my treatment were discussed. This note was scribed in the presence of Rosemary Moeller MD by Mine Hinton RN.     The scribe's documentation has been prepared under my direction and personally reviewed by me in its entirety. I confirm that the note above accurately reflects all work, physical examination, the discussion of treatments and procedures, and medical decision making performed by me.     Rosemary Moeller MD, MS, [facility]  Cardiac Electrophysiology  The [address] [place] [address]  [phone]

## 2020-08-18 ENCOUNTER — TELEPHONE (OUTPATIENT)
Dept: CARDIOLOGY CLINIC | Age: 70
End: 2020-08-18

## 2020-08-18 ENCOUNTER — HOSPITAL ENCOUNTER (INPATIENT)
Age: 70
LOS: 2 days | Discharge: HOME | DRG: 308 | End: 2020-08-20
Attending: EMERGENCY MEDICINE | Admitting: INTERNAL MEDICINE
Payer: MEDICARE

## 2020-08-18 ENCOUNTER — APPOINTMENT (OUTPATIENT)
Dept: GENERAL RADIOLOGY | Age: 70
DRG: 308 | End: 2020-08-18
Payer: MEDICARE

## 2020-08-18 LAB
A/G RATIO: 1.1 (ref 1.1–2.2)
ALBUMIN SERPL-MCNC: 3.9 G/DL (ref 3.4–5)
ALP BLD-CCNC: 53 U/L (ref 40–129)
ALT SERPL-CCNC: 11 U/L (ref 10–40)
ANION GAP SERPL CALCULATED.3IONS-SCNC: 14 MMOL/L (ref 3–16)
ANISOCYTOSIS: ABNORMAL
AST SERPL-CCNC: 18 U/L (ref 15–37)
BASOPHILS ABSOLUTE: 0 K/UL (ref 0–0.2)
BASOPHILS RELATIVE PERCENT: 0 %
BILIRUB SERPL-MCNC: 0.3 MG/DL (ref 0–1)
BUN BLDV-MCNC: 13 MG/DL (ref 7–20)
CALCIUM SERPL-MCNC: 9.4 MG/DL (ref 8.3–10.6)
CHLORIDE BLD-SCNC: 93 MMOL/L (ref 99–110)
CO2: 26 MMOL/L (ref 21–32)
CREAT SERPL-MCNC: 0.5 MG/DL (ref 0.6–1.2)
EOSINOPHILS ABSOLUTE: 0.5 K/UL (ref 0–0.6)
EOSINOPHILS RELATIVE PERCENT: 4 %
GFR AFRICAN AMERICAN: >60
GFR NON-AFRICAN AMERICAN: >60
GLOBULIN: 3.4 G/DL
GLUCOSE BLD-MCNC: 188 MG/DL (ref 70–99)
GLUCOSE BLD-MCNC: 248 MG/DL (ref 70–99)
HCT VFR BLD CALC: 42.4 % (ref 36–48)
HEMOGLOBIN: 14.5 G/DL (ref 12–16)
LYMPHOCYTES ABSOLUTE: 4.8 K/UL (ref 1–5.1)
LYMPHOCYTES RELATIVE PERCENT: 39 %
MCH RBC QN AUTO: 27.6 PG (ref 26–34)
MCHC RBC AUTO-ENTMCNC: 34.2 G/DL (ref 31–36)
MCV RBC AUTO: 80.8 FL (ref 80–100)
MONOCYTES ABSOLUTE: 0.6 K/UL (ref 0–1.3)
MONOCYTES RELATIVE PERCENT: 5 %
NEUTROPHILS ABSOLUTE: 6.4 K/UL (ref 1.7–7.7)
NEUTROPHILS RELATIVE PERCENT: 52 %
OVALOCYTES: ABNORMAL
PDW BLD-RTO: 15.8 % (ref 12.4–15.4)
PERFORMED ON: ABNORMAL
PLATELET # BLD: 238 K/UL (ref 135–450)
PMV BLD AUTO: 9.4 FL (ref 5–10.5)
POTASSIUM REFLEX MAGNESIUM: 3.7 MMOL/L (ref 3.5–5.1)
PRO-BNP: 1345 PG/ML (ref 0–124)
RBC # BLD: 5.26 M/UL (ref 4–5.2)
SODIUM BLD-SCNC: 133 MMOL/L (ref 136–145)
TOTAL PROTEIN: 7.3 G/DL (ref 6.4–8.2)
TROPONIN: <0.01 NG/ML
WBC # BLD: 12.4 K/UL (ref 4–11)

## 2020-08-18 PROCEDURE — 96376 TX/PRO/DX INJ SAME DRUG ADON: CPT

## 2020-08-18 PROCEDURE — 99285 EMERGENCY DEPT VISIT HI MDM: CPT

## 2020-08-18 PROCEDURE — 2500000003 HC RX 250 WO HCPCS: Performed by: NURSE PRACTITIONER

## 2020-08-18 PROCEDURE — 84484 ASSAY OF TROPONIN QUANT: CPT

## 2020-08-18 PROCEDURE — 71045 X-RAY EXAM CHEST 1 VIEW: CPT

## 2020-08-18 PROCEDURE — 96361 HYDRATE IV INFUSION ADD-ON: CPT

## 2020-08-18 PROCEDURE — 83880 ASSAY OF NATRIURETIC PEPTIDE: CPT

## 2020-08-18 PROCEDURE — 80053 COMPREHEN METABOLIC PANEL: CPT

## 2020-08-18 PROCEDURE — 96374 THER/PROPH/DIAG INJ IV PUSH: CPT

## 2020-08-18 PROCEDURE — 2060000000 HC ICU INTERMEDIATE R&B

## 2020-08-18 PROCEDURE — 6370000000 HC RX 637 (ALT 250 FOR IP): Performed by: INTERNAL MEDICINE

## 2020-08-18 PROCEDURE — 2580000003 HC RX 258: Performed by: NURSE PRACTITIONER

## 2020-08-18 PROCEDURE — 85025 COMPLETE CBC W/AUTO DIFF WBC: CPT

## 2020-08-18 PROCEDURE — 93005 ELECTROCARDIOGRAM TRACING: CPT | Performed by: EMERGENCY MEDICINE

## 2020-08-18 RX ORDER — ONDANSETRON 2 MG/ML
4 INJECTION INTRAMUSCULAR; INTRAVENOUS EVERY 6 HOURS PRN
Status: DISCONTINUED | OUTPATIENT
Start: 2020-08-18 | End: 2020-08-20 | Stop reason: HOSPADM

## 2020-08-18 RX ORDER — LISINOPRIL AND HYDROCHLOROTHIAZIDE 12.5; 1 MG/1; MG/1
2 TABLET ORAL 2 TIMES DAILY
Status: DISCONTINUED | OUTPATIENT
Start: 2020-08-19 | End: 2020-08-19

## 2020-08-18 RX ORDER — DILTIAZEM HYDROCHLORIDE 5 MG/ML
15 INJECTION INTRAVENOUS ONCE
Status: COMPLETED | OUTPATIENT
Start: 2020-08-18 | End: 2020-08-18

## 2020-08-18 RX ORDER — 0.9 % SODIUM CHLORIDE 0.9 %
1000 INTRAVENOUS SOLUTION INTRAVENOUS ONCE
Status: COMPLETED | OUTPATIENT
Start: 2020-08-18 | End: 2020-08-18

## 2020-08-18 RX ORDER — M-VIT,TX,IRON,MINS/CALC/FOLIC 27MG-0.4MG
1 TABLET ORAL DAILY
Status: DISCONTINUED | OUTPATIENT
Start: 2020-08-19 | End: 2020-08-20 | Stop reason: HOSPADM

## 2020-08-18 RX ORDER — ACETAMINOPHEN 325 MG/1
650 TABLET ORAL EVERY 6 HOURS PRN
Status: DISCONTINUED | OUTPATIENT
Start: 2020-08-18 | End: 2020-08-20 | Stop reason: HOSPADM

## 2020-08-18 RX ORDER — LORAZEPAM 0.5 MG/1
0.5 TABLET ORAL 2 TIMES DAILY
Status: DISCONTINUED | OUTPATIENT
Start: 2020-08-18 | End: 2020-08-20 | Stop reason: HOSPADM

## 2020-08-18 RX ORDER — DILTIAZEM HYDROCHLORIDE 5 MG/ML
5 INJECTION INTRAVENOUS ONCE
Status: COMPLETED | OUTPATIENT
Start: 2020-08-18 | End: 2020-08-18

## 2020-08-18 RX ORDER — SODIUM CHLORIDE 0.9 % (FLUSH) 0.9 %
10 SYRINGE (ML) INJECTION EVERY 12 HOURS SCHEDULED
Status: DISCONTINUED | OUTPATIENT
Start: 2020-08-18 | End: 2020-08-20 | Stop reason: HOSPADM

## 2020-08-18 RX ORDER — DEXTROSE MONOHYDRATE 50 MG/ML
100 INJECTION, SOLUTION INTRAVENOUS PRN
Status: DISCONTINUED | OUTPATIENT
Start: 2020-08-18 | End: 2020-08-20 | Stop reason: HOSPADM

## 2020-08-18 RX ORDER — I-VITE, TAB 1000-60-2MG (60/BT) 300MCG-200
1 TAB ORAL DAILY
Status: DISCONTINUED | OUTPATIENT
Start: 2020-08-19 | End: 2020-08-20 | Stop reason: HOSPADM

## 2020-08-18 RX ORDER — ACETAMINOPHEN 650 MG/1
650 SUPPOSITORY RECTAL EVERY 6 HOURS PRN
Status: DISCONTINUED | OUTPATIENT
Start: 2020-08-18 | End: 2020-08-20 | Stop reason: HOSPADM

## 2020-08-18 RX ORDER — METOPROLOL SUCCINATE 50 MG/1
100 TABLET, EXTENDED RELEASE ORAL DAILY
Status: DISCONTINUED | OUTPATIENT
Start: 2020-08-19 | End: 2020-08-20 | Stop reason: HOSPADM

## 2020-08-18 RX ORDER — LANOLIN ALCOHOL/MO/W.PET/CERES
400 CREAM (GRAM) TOPICAL 2 TIMES DAILY
Status: DISCONTINUED | OUTPATIENT
Start: 2020-08-18 | End: 2020-08-20 | Stop reason: HOSPADM

## 2020-08-18 RX ORDER — PROMETHAZINE HYDROCHLORIDE 25 MG/1
12.5 TABLET ORAL EVERY 6 HOURS PRN
Status: DISCONTINUED | OUTPATIENT
Start: 2020-08-18 | End: 2020-08-20 | Stop reason: HOSPADM

## 2020-08-18 RX ORDER — DILTIAZEM HYDROCHLORIDE 180 MG/1
360 CAPSULE, COATED, EXTENDED RELEASE ORAL DAILY
Status: DISCONTINUED | OUTPATIENT
Start: 2020-08-19 | End: 2020-08-20 | Stop reason: HOSPADM

## 2020-08-18 RX ORDER — SODIUM CHLORIDE 0.9 % (FLUSH) 0.9 %
10 SYRINGE (ML) INJECTION PRN
Status: DISCONTINUED | OUTPATIENT
Start: 2020-08-18 | End: 2020-08-20 | Stop reason: HOSPADM

## 2020-08-18 RX ORDER — DEXTROSE MONOHYDRATE 25 G/50ML
12.5 INJECTION, SOLUTION INTRAVENOUS PRN
Status: DISCONTINUED | OUTPATIENT
Start: 2020-08-18 | End: 2020-08-20 | Stop reason: HOSPADM

## 2020-08-18 RX ORDER — NICOTINE POLACRILEX 4 MG
15 LOZENGE BUCCAL PRN
Status: DISCONTINUED | OUTPATIENT
Start: 2020-08-18 | End: 2020-08-20 | Stop reason: HOSPADM

## 2020-08-18 RX ORDER — ATORVASTATIN CALCIUM 40 MG/1
40 TABLET, FILM COATED ORAL NIGHTLY
Status: DISCONTINUED | OUTPATIENT
Start: 2020-08-18 | End: 2020-08-20 | Stop reason: HOSPADM

## 2020-08-18 RX ORDER — ISOSORBIDE MONONITRATE 30 MG/1
30 TABLET, EXTENDED RELEASE ORAL DAILY
Status: DISCONTINUED | OUTPATIENT
Start: 2020-08-19 | End: 2020-08-20 | Stop reason: HOSPADM

## 2020-08-18 RX ORDER — POLYETHYLENE GLYCOL 3350 17 G/17G
17 POWDER, FOR SOLUTION ORAL DAILY PRN
Status: DISCONTINUED | OUTPATIENT
Start: 2020-08-18 | End: 2020-08-20 | Stop reason: HOSPADM

## 2020-08-18 RX ORDER — PANTOPRAZOLE SODIUM 40 MG/1
40 TABLET, DELAYED RELEASE ORAL
Status: DISCONTINUED | OUTPATIENT
Start: 2020-08-19 | End: 2020-08-20 | Stop reason: HOSPADM

## 2020-08-18 RX ADMIN — DILTIAZEM HYDROCHLORIDE 5 MG: 5 INJECTION INTRAVENOUS at 17:20

## 2020-08-18 RX ADMIN — DILTIAZEM HYDROCHLORIDE 5 MG/HR: 5 INJECTION INTRAVENOUS at 18:10

## 2020-08-18 RX ADMIN — LORAZEPAM 0.5 MG: 0.5 TABLET ORAL at 22:32

## 2020-08-18 RX ADMIN — INSULIN LISPRO 1 UNITS: 100 INJECTION, SOLUTION INTRAVENOUS; SUBCUTANEOUS at 22:34

## 2020-08-18 RX ADMIN — SODIUM CHLORIDE 1000 ML: 9 INJECTION, SOLUTION INTRAVENOUS at 17:46

## 2020-08-18 RX ADMIN — DILTIAZEM HYDROCHLORIDE 15 MG: 5 INJECTION INTRAVENOUS at 17:15

## 2020-08-18 RX ADMIN — APIXABAN 5 MG: 5 TABLET, FILM COATED ORAL at 22:32

## 2020-08-18 RX ADMIN — METFORMIN HYDROCHLORIDE 850 MG: 850 TABLET ORAL at 22:32

## 2020-08-18 RX ADMIN — ATORVASTATIN CALCIUM 40 MG: 40 TABLET, FILM COATED ORAL at 22:32

## 2020-08-18 RX ADMIN — MAGNESIUM OXIDE TAB 400 MG (241.3 MG ELEMENTAL MG) 400 MG: 400 (241.3 MG) TAB at 22:32

## 2020-08-18 ASSESSMENT — PAIN SCALES - GENERAL
PAINLEVEL_OUTOF10: 0
PAINLEVEL_OUTOF10: 0

## 2020-08-18 ASSESSMENT — ENCOUNTER SYMPTOMS
SHORTNESS OF BREATH: 0
VOMITING: 0
DIARRHEA: 0
COUGH: 0
ABDOMINAL PAIN: 0
COLOR CHANGE: 0
NAUSEA: 0

## 2020-08-18 NOTE — ED PROVIDER NOTES
[address] [facility]        Pt Name: Sarah Pond  MRN: [id number]  [place] 1950  Date of evaluation: 8/18/2020  Provider: OSWALD Kumar - EMILI  PCP: Rosa Alicea MD     I have seen and evaluated this patient with my supervising physician Elvira Bradley, [address] [place] [address]       Chief Complaint   Patient presents with    Tachycardia       HISTORY OF PRESENT ILLNESS   (Location, Timing/Onset, Context/Setting, Quality, Duration, Modifying Factors, Severity, Associated Signs and Symptoms)  Note limiting factors. Sarah Pond is a 69 y.o. female with PMH significant for HLD, HTN, DM, GERD, hypothyroidism, and PAF and CAD anticoagulated on Eliquis who presents to the emergency department today complaining of palpitations. Onset was 1700 hrs. yesterday. The context is that her cardiologist, Dr. Taz Mckeon, recently took her off flecainide. She really endorses chronic dyspnea with exertion, denies chest pain or tightness. No recent illness. Nursing Notes were all reviewed and agreed with or any disagreements were addressed in the HPI. REVIEW OF SYSTEMS    (2-9 systems for level 4, 10 or more for level 5)     Review of Systems   Constitutional: Positive for fatigue. Negative for chills, diaphoresis and fever. HENT: Negative. Eyes: Negative for visual disturbance. Respiratory: Negative for cough and shortness of breath. Cardiovascular: Positive for palpitations. Negative for chest pain and leg swelling. Gastrointestinal: Negative for abdominal pain, diarrhea, nausea and vomiting. Skin: Negative for color change, pallor and rash. Allergic/Immunologic: Negative for immunocompromised state. Neurological: Negative for dizziness, syncope, weakness, numbness and headaches. Hematological: Negative for adenopathy. Psychiatric/Behavioral: Negative for confusion.    All other systems reviewed and are negative. Positives and Pertinent negatives as per HPI. Except as noted above in the ROS, all other systems were reviewed and negative. PAST MEDICAL HISTORY     Past Medical History:   Diagnosis Date    Atrial fibrillation ([address]     CAD (coronary artery disease)     NSTEMI 1/201-->Echo LVEF normal, grade II diastolic dysfx, LAE. Cath 1/27/17 70-75% LAD, FFR LAD 0.80, 50% PLD- Med tx.      GERD (gastroesophageal reflux disease)     High triglycerides     HLD (hyperlipidemia)     Hypertension     Menopause     Osteoarthritis     Stress incontinence     Thyroid disease     Type II or unspecified type diabetes mellitus without mention of complication, not stated as uncontrolled          SURGICAL HISTORY     Past Surgical History:   Procedure Laterality Date    APPENDECTOMY  1953    ATRIAL ABLATION SURGERY N/A 10/30/2018    BRITTANY Atrial Flutter Ablation    CHOLECYSTECTOMY  9/2006    COLONOSCOPY      FACIAL COSMETIC SURGERY  1965    TONSILLECTOMY  1952    TUBAL LIGATION           CURRENTMEDICATIONS       Previous Medications    ATORVASTATIN (LIPITOR) 40 MG TABLET    TAKE 1 TABLET BY MOUTH EVERY DAY    CALCIUM-VITAMIN D (OSCAL-500) 500-200 MG-UNIT PER TABLET    Take 2 tablets by mouth daily    DILTIAZEM (CARDIZEM CD) 360 MG EXTENDED RELEASE CAPSULE    TAKE 1 CAPSULE BY MOUTH EVERY DAY    ELIQUIS 5 MG TABS TABLET    TAKE 1 TABLET BY MOUTH TWICE A DAY    ISOSORBIDE MONONITRATE (IMDUR) 30 MG EXTENDED RELEASE TABLET    Take 1 tablet by mouth daily    KRILL  MG CAPS    Take 350 mg by mouth 2 times daily    LISINOPRIL-HYDROCHLOROTHIAZIDE (PRINZIDE;ZESTORETIC) 20-25 MG PER TABLET    TAKE 1 TABLET BY MOUTH TWICE A DAY    LORAZEPAM (ATIVAN) 0.5 MG TABLET    Take 1 tablet by mouth nightly    MAGNESIUM OXIDE (MAG-OX) 400 MG TABLET    TAKE 1 TABLET BY MOUTH TWICE A DAY    METFORMIN (GLUCOPHAGE) 850 MG TABLET    Take 1 tablet by mouth 3 times daily    METOPROLOL SUCCINATE (TOPROL XL) 100 MG EXTENDED RELEASE TABLET    TAKE 1 TABLET BY MOUTH EVERY DAY    MULTIPLE VITAMINS-MINERALS (EYE VITAMINS) CAPS    Take 1 tablet by mouth daily    MULTIPLE VITAMINS-MINERALS (THERAPEUTIC MULTIVITAMIN-MINERALS) TABLET    Take 1 tablet by mouth daily    OMEPRAZOLE (PRILOSEC OTC) 20 MG TABLET    Take 1 tablet by mouth daily         ALLERGIES     Influenza vaccines    FAMILYHISTORY       Family History   Problem Relation Age of Onset    Diabetes Mother     High Blood Pressure Mother     Cancer Mother         breast cancer    Cancer Father         prostate    Coronary Art Dis Father     Other Maternal Grandmother         enlarged heart          SOCIAL HISTORY       Social History     Tobacco Use    Smoking status: Current Every Day Smoker     Packs/day: 1.00     Years: 45.00     Pack years: 45.00     Types: Cigarettes     Last attempt to quit: 2018     Years since quittin.9    Smokeless tobacco: Never Used   Substance Use Topics    Alcohol use: No     Alcohol/week: 0.0 standard drinks    Drug use: No       SCREENINGS             PHYSICAL EXAM    (up to 7 for level 4, 8 or more for level 5)     ED Triage Vitals [20 1706]   BP Temp Temp Source Pulse Resp SpO2 Height Weight   105/79 98.1 °F (36.7 °C) Oral 174 30 97 % 5' 6.5\" (1.689 m) 172 lb (78 kg)       Physical Exam  Vitals signs and nursing note reviewed. Constitutional:       General: She is not in acute distress. Appearance: Normal appearance. She is well-developed. She is not toxic-appearing. HENT:      Head: Normocephalic and atraumatic. Eyes:      General: No scleral icterus. Conjunctiva/sclera: Conjunctivae normal.   Neck:      Musculoskeletal: Full passive range of motion without pain and neck supple. No neck rigidity. Vascular: No JVD. Cardiovascular:      Rate and Rhythm: Tachycardia present. Rhythm irregularly irregular. Heart sounds: Normal heart sounds.    Pulmonary:      Effort: Pulmonary effort is normal. No respiratory distress. Breath sounds: Normal breath sounds. Abdominal:      General: There is no distension. Palpations: Abdomen is soft. Abdomen is not rigid. Tenderness: There is no abdominal tenderness. Musculoskeletal: Normal range of motion. Skin:     General: Skin is warm and dry. Capillary Refill: Capillary refill takes less than 2 seconds. Findings: No rash. Neurological:      General: No focal deficit present. Mental Status: She is alert and oriented to person, place, and time. Cranial Nerves: Cranial nerves are intact.    Psychiatric:         Mood and Affect: Mood normal.         DIAGNOSTIC RESULTS   LABS:    Labs Reviewed   CBC WITH AUTO DIFFERENTIAL - Abnormal; Notable for the following components:       Result Value    WBC 12.4 (*)     RBC 5.26 (*)     RDW 15.8 (*)     Anisocytosis 1+ (*)     Ovalocytes Occasional (*)     All other components within normal limits    Narrative:     Performed at:  [address] [employer]   Phone [phone]   COMPREHENSIVE METABOLIC PANEL W/ REFLEX TO MG FOR LOW K - Abnormal; Notable for the following components:    Sodium 133 (*)     Chloride 93 (*)     Glucose 248 (*)     CREATININE 0.5 (*)     All other components within normal limits    Narrative:     Performed at:  [address] [facility]   Phone [phone]   BRAIN NATRIURETIC PEPTIDE - Abnormal; Notable for the following components:    Pro-BNP 1,345 (*)     All other components within normal limits    Narrative:     Performed at:  [address] [place] [facility]   Phone [phone]   TROPONIN    Narrative:     Performed at:  [address] [place] [facility]   Phone [phone]       All other labs were within normal range or not returned as of this dictation. EKG: All EKG's are interpreted by the Emergency Department Physician in the absence of a cardiologist.  Please see their note for interpretation of EKG. RADIOLOGY:   Non-plain film images such as CT, Ultrasound and MRI are read by the radiologist. Plain radiographic images are visualized and preliminarily interpreted by the ED Provider with the below findings:        Interpretation per the Radiologist below, if available at the time of this note:    XR CHEST PORTABLE   Final Result   Mild pulmonary vascular congestion. No results found. PROCEDURES   Unless otherwise noted below, none     Procedures    CRITICAL CARE TIME   CRITICAL CARE NOTE:  There was a high probability of clinically significant life-threatening deterioration of the patient's condition requiring my urgent intervention. Total critical care time was at least 32 minutes. This includes vital sign monitoring, pulse oximetry monitoring, telemetry monitoring, clinical response to the IV medications, reviewing the nursing notes, consultation time, dictation/documentation time, and interpretation of the labwork. This excludes any separately billable procedures performed.       CONSULTS:  IP CONSULT TO CARDIOLOGY      EMERGENCY DEPARTMENT COURSE and DIFFERENTIAL DIAGNOSIS/MDM:   Vitals:    Vitals:    08/18/20 1715 08/18/20 1720 08/18/20 1725 08/18/20 1730   BP: (!) 96/59 105/77 108/76 101/78   Pulse: 107 172 137 131   Resp: 20 27 24 22   Temp:       TempSrc:       SpO2: 97% 97% 95% 96%   Weight:       Height:           Patient was given the following medications:  Medications   dilTIAZem 125 mg in dextrose 5 % 125 mL infusion (5 mg/hr Intravenous New Bag 8/18/20 1810)   0.9 % sodium chloride bolus (1,000 mLs Intravenous New Bag 8/18/20 1746)   dilTIAZem injection 15 mg (15 mg Intravenous Given 8/18/20 1715)   dilTIAZem injection 5 mg (5 mg Intravenous Given 8/18/20 1720)           Differential are mis-transcribed.)    OSWALD Krause CNP (electronically signed)            OSWALD Krause CNP  08/18/20 7602

## 2020-08-18 NOTE — TELEPHONE ENCOUNTER
Spoke with Dr. Jimi Sánchez. Orders received to instruct patient to go to the ED for treatment of atrial fibrillation with RVR. Spoke with patient, advised of Dr. Angelica Braden recommendation. Verbalized understanding.

## 2020-08-18 NOTE — TELEPHONE ENCOUNTER
Dr. Salma Durbin,    I called and spoke with patient regarding her Urgent [employer] report which showed that yesterday 8/17/2020 at 5:09 pm she was in afib RVR, rate of 227 bpm.   She reports that she making dinner at the time of urgent report and that she was felt her heart racing and was lightheaded and dizzy. She states that she was told to take an extra Toprol  mg by her PCP. She states that she feel like she is still running high around 160 bpm and is still lightheaded. She is taking her Toprol  mg daily and Diltiazem 360 mg daily. She is s/p atrial fibrillation ablation on 6/2/2020 and her Flecainide was stopped on 8/12/2020 in order to assess baseline rhythm apart for any AAD's. Recommendations? Should she restart her Flecainide 50 mg BID? I have contacted [employer] to get a report to see her current rate and rhythm but have not yet received it. Please advise in Dr. Fagan No absence. Report is scanned under media tab.     Thanks,  Melanie Lakhani RN

## 2020-08-18 NOTE — ED PROVIDER NOTES
Attending Note:    The patient was seen and examined by the mid-level provider. I also completed a face-to-face examination. HPI: Desi Macdonald is a 69 y.o. female who presents to the emergency department with a complaint of sudden onset of feeling like her heart is racing. She first noticed it yesterday afternoon while standing. She denies any associated chest pain heaviness pressure or tightness. She does report some dyspnea on exertion since the symptoms started but denies any shortness of breath at rest.  She is known to have a history of atrial fibrillation. She is anticoagulated on Eliquis and is compliant with her medications. She states that she has had atrial fibrillation for a number of years and has had 2 prior ablations. She recently saw her cardiologist Dr. Jean Gottlieb. She currently is wearing an event monitor. She reports that he discontinued her flecainide 3 days ago in an effort to \"see what her heart is doing\". She states that he is considering doing another ablation. Physical Exam:     Constitutional: No apparent distress. Peers comfortable. Head: No visible evidence of trauma. Normocephalic. Eyes: Pupils equal and reactive. No photophobia. Conjunctiva normal.    HENT: Oral mucosa moist.  Airway patent. Neck:  Soft and supple. Nontender. Heart: Rapid and irregular. Rate 128. Atrial fibrillation on the monitor. Lungs:  Clear to auscultation. No wheezes, rales, or ronchi. No conversational dyspnea or accessory muscle use. Abdomen:  Soft, non-distended. Nontender. No guarding rigidity or rebound. Musculoskeletal: Extremities non-tender with full range of motion. Radial and dorsalis pedis pulses were equal laterally. No calf tenderness erythema or edema. Neurological: Alert and oriented x 3. Speech clear. No acute focal motor or sensory deficits. No facial droop. No pronator drift. Skin: Skin is warm and dry. No rash. Psychiatric: Normal mood and affect. Phone [phone]   TROPONIN    Narrative:     Performed at:  [facility]  [address]   Phone [phone]       All other labs were within normal range or not returned as of this dictation. EMERGENCY DEPARTMENT COURSE and DIFFERENTIAL DIAGNOSIS/MDM:   Vitals:    Vitals:    08/18/20 1715 08/18/20 1720 08/18/20 1725 08/18/20 1730   BP: (!) 96/59 105/77 108/76 101/78   Pulse: 107 172 137 131   Resp: 20 27 24 22   Temp:       TempSrc:       SpO2: 97% 97% 95% 96%   Weight:       Height:           Patient presented with palpitations as noted above. Initial heart rate was in the 170s. Initial EKG revealed A. fib with RVR. She was started on Cardizem bolus and drip. She was also given fluid bolus of normal saline 500 mL. Initial blood pressure was 105/77. She is fully awake and alert. No associated chest pain.    6:01 PM: The patient was reexamined. Heart rate is 115. She is feeling much improved. Repeat EKG was obtained which reveals heart rate of 127. Atrial fibrillation with rapid ventricular response. Troponin is normal.  Creatinine 0.5. Glucose 248. Potassium 3.7. Blood cell count is 12.4. No clinical suspicion for infection. She is afebrile. No cough or cold symptoms. Chest x-ray shows no evidence of failure. But elevated right hemidiaphragm. No infiltrate. The patient will be admitted for further treatment and evaluation. MDM      CRITICAL CARE TIME   Total Critical Care time was 30 minutes, excluding separately reportable procedures. There was a high probability of clinically significant/life threatening deterioration in the patient's condition which required my urgent intervention. CONSULTS:  None    PROCEDURES:  Unless otherwise noted below, none     Procedures        FINAL IMPRESSION      1.  Atrial fibrillation with rapid ventricular response (HCC)          DISPOSITION/PLAN   DISPOSITION        PATIENT REFERRED TO:  No follow-up provider specified. DISCHARGE MEDICATIONS:  New Prescriptions    No medications on file     Controlled Substances Monitoring:     RX Monitoring 1/3/2017   Attestation The Prescription Monitoring Report for this patient was reviewed today. Periodic Controlled Substance Monitoring -       (Please note that portions of this note were completed with a voice recognition program.  Efforts were made to edit the dictations but occasionally words are mis-transcribed. )    1859 Arian Martinez DO (electronically signed)  Attending Emergency Physician      Marge Pickering DO  08/18/20 7099

## 2020-08-19 PROBLEM — I47.19 ATRIAL TACHYCARDIA: Status: ACTIVE | Noted: 2020-08-19

## 2020-08-19 PROBLEM — I47.1 ATRIAL TACHYCARDIA (HCC): Status: ACTIVE | Noted: 2020-08-19

## 2020-08-19 LAB
ANION GAP SERPL CALCULATED.3IONS-SCNC: 16 MMOL/L (ref 3–16)
BASOPHILS ABSOLUTE: 0.1 K/UL (ref 0–0.2)
BASOPHILS RELATIVE PERCENT: 0.6 %
BUN BLDV-MCNC: 12 MG/DL (ref 7–20)
CALCIUM SERPL-MCNC: 8.8 MG/DL (ref 8.3–10.6)
CHLORIDE BLD-SCNC: 94 MMOL/L (ref 99–110)
CO2: 25 MMOL/L (ref 21–32)
CREAT SERPL-MCNC: 0.5 MG/DL (ref 0.6–1.2)
CREAT SERPL-MCNC: <0.5 MG/DL (ref 0.6–1.2)
EKG ATRIAL RATE: 107 BPM
EKG ATRIAL RATE: 153 BPM
EKG ATRIAL RATE: 174 BPM
EKG DIAGNOSIS: NORMAL
EKG P AXIS: 58 DEGREES
EKG P AXIS: 80 DEGREES
EKG P-R INTERVAL: 166 MS
EKG P-R INTERVAL: 180 MS
EKG Q-T INTERVAL: 214 MS
EKG Q-T INTERVAL: 268 MS
EKG Q-T INTERVAL: 370 MS
EKG QRS DURATION: 72 MS
EKG QRS DURATION: 78 MS
EKG QRS DURATION: 82 MS
EKG QTC CALCULATION (BAZETT): 311 MS
EKG QTC CALCULATION (BAZETT): 456 MS
EKG QTC CALCULATION (BAZETT): 493 MS
EKG R AXIS: 13 DEGREES
EKG R AXIS: 50 DEGREES
EKG R AXIS: 7 DEGREES
EKG T AXIS: 128 DEGREES
EKG T AXIS: 142 DEGREES
EKG T AXIS: 168 DEGREES
EKG VENTRICULAR RATE: 107 BPM
EKG VENTRICULAR RATE: 127 BPM
EKG VENTRICULAR RATE: 174 BPM
EOSINOPHILS ABSOLUTE: 0.2 K/UL (ref 0–0.6)
EOSINOPHILS RELATIVE PERCENT: 1.5 %
GFR AFRICAN AMERICAN: >60
GFR AFRICAN AMERICAN: >60
GFR NON-AFRICAN AMERICAN: >60
GFR NON-AFRICAN AMERICAN: >60
GLUCOSE BLD-MCNC: 179 MG/DL (ref 70–99)
GLUCOSE BLD-MCNC: 189 MG/DL (ref 70–99)
GLUCOSE BLD-MCNC: 204 MG/DL (ref 70–99)
GLUCOSE BLD-MCNC: 206 MG/DL (ref 70–99)
GLUCOSE BLD-MCNC: 210 MG/DL (ref 70–99)
HCT VFR BLD CALC: 40 % (ref 36–48)
HEMOGLOBIN: 13.4 G/DL (ref 12–16)
LV EF: 53 %
LVEF MODALITY: NORMAL
LYMPHOCYTES ABSOLUTE: 4.4 K/UL (ref 1–5.1)
LYMPHOCYTES RELATIVE PERCENT: 41.7 %
MAGNESIUM: 1 MG/DL (ref 1.8–2.4)
MAGNESIUM: 1.9 MG/DL (ref 1.8–2.4)
MCH RBC QN AUTO: 27.2 PG (ref 26–34)
MCHC RBC AUTO-ENTMCNC: 33.4 G/DL (ref 31–36)
MCV RBC AUTO: 81.3 FL (ref 80–100)
MONOCYTES ABSOLUTE: 0.7 K/UL (ref 0–1.3)
MONOCYTES RELATIVE PERCENT: 6.7 %
NEUTROPHILS ABSOLUTE: 5.2 K/UL (ref 1.7–7.7)
NEUTROPHILS RELATIVE PERCENT: 49.5 %
PDW BLD-RTO: 15.9 % (ref 12.4–15.4)
PERFORMED ON: ABNORMAL
PLATELET # BLD: 192 K/UL (ref 135–450)
PMV BLD AUTO: 9.1 FL (ref 5–10.5)
POTASSIUM REFLEX MAGNESIUM: 2.9 MMOL/L (ref 3.5–5.1)
POTASSIUM SERPL-SCNC: 3.6 MMOL/L (ref 3.5–5.1)
RBC # BLD: 4.93 M/UL (ref 4–5.2)
SODIUM BLD-SCNC: 135 MMOL/L (ref 136–145)
T4 FREE: 1.6 NG/DL (ref 0.9–1.8)
TSH SERPL DL<=0.05 MIU/L-ACNC: 2.92 UIU/ML (ref 0.27–4.2)
WBC # BLD: 10.5 K/UL (ref 4–11)

## 2020-08-19 PROCEDURE — 82565 ASSAY OF CREATININE: CPT

## 2020-08-19 PROCEDURE — 93306 TTE W/DOPPLER COMPLETE: CPT

## 2020-08-19 PROCEDURE — 2500000003 HC RX 250 WO HCPCS: Performed by: INTERNAL MEDICINE

## 2020-08-19 PROCEDURE — 85025 COMPLETE CBC W/AUTO DIFF WBC: CPT

## 2020-08-19 PROCEDURE — 83735 ASSAY OF MAGNESIUM: CPT

## 2020-08-19 PROCEDURE — 93010 ELECTROCARDIOGRAM REPORT: CPT | Performed by: INTERNAL MEDICINE

## 2020-08-19 PROCEDURE — 2060000000 HC ICU INTERMEDIATE R&B

## 2020-08-19 PROCEDURE — 6370000000 HC RX 637 (ALT 250 FOR IP): Performed by: INTERNAL MEDICINE

## 2020-08-19 PROCEDURE — 99222 1ST HOSP IP/OBS MODERATE 55: CPT | Performed by: INTERNAL MEDICINE

## 2020-08-19 PROCEDURE — 84132 ASSAY OF SERUM POTASSIUM: CPT

## 2020-08-19 PROCEDURE — 84439 ASSAY OF FREE THYROXINE: CPT

## 2020-08-19 PROCEDURE — 94760 N-INVAS EAR/PLS OXIMETRY 1: CPT

## 2020-08-19 PROCEDURE — 93005 ELECTROCARDIOGRAM TRACING: CPT | Performed by: INTERNAL MEDICINE

## 2020-08-19 PROCEDURE — 80048 BASIC METABOLIC PNL TOTAL CA: CPT

## 2020-08-19 PROCEDURE — 6360000002 HC RX W HCPCS: Performed by: INTERNAL MEDICINE

## 2020-08-19 PROCEDURE — 84443 ASSAY THYROID STIM HORMONE: CPT

## 2020-08-19 PROCEDURE — 2580000003 HC RX 258: Performed by: INTERNAL MEDICINE

## 2020-08-19 PROCEDURE — 36415 COLL VENOUS BLD VENIPUNCTURE: CPT

## 2020-08-19 RX ORDER — POTASSIUM CHLORIDE 7.45 MG/ML
10 INJECTION INTRAVENOUS PRN
Status: DISCONTINUED | OUTPATIENT
Start: 2020-08-19 | End: 2020-08-20

## 2020-08-19 RX ORDER — FUROSEMIDE 10 MG/ML
20 INJECTION INTRAMUSCULAR; INTRAVENOUS ONCE
Status: COMPLETED | OUTPATIENT
Start: 2020-08-19 | End: 2020-08-19

## 2020-08-19 RX ORDER — FLECAINIDE ACETATE 100 MG/1
50 TABLET ORAL EVERY 12 HOURS SCHEDULED
Status: DISCONTINUED | OUTPATIENT
Start: 2020-08-19 | End: 2020-08-20 | Stop reason: HOSPADM

## 2020-08-19 RX ORDER — MAGNESIUM SULFATE 1 G/100ML
1 INJECTION INTRAVENOUS PRN
Status: DISCONTINUED | OUTPATIENT
Start: 2020-08-19 | End: 2020-08-20 | Stop reason: HOSPADM

## 2020-08-19 RX ORDER — LISINOPRIL AND HYDROCHLOROTHIAZIDE 12.5; 1 MG/1; MG/1
1 TABLET ORAL 2 TIMES DAILY
Status: DISCONTINUED | OUTPATIENT
Start: 2020-08-19 | End: 2020-08-20 | Stop reason: HOSPADM

## 2020-08-19 RX ADMIN — MAGNESIUM OXIDE TAB 400 MG (241.3 MG ELEMENTAL MG) 400 MG: 400 (241.3 MG) TAB at 21:04

## 2020-08-19 RX ADMIN — POTASSIUM CHLORIDE 10 MEQ: 7.46 INJECTION, SOLUTION INTRAVENOUS at 12:40

## 2020-08-19 RX ADMIN — POTASSIUM CHLORIDE 10 MEQ: 7.46 INJECTION, SOLUTION INTRAVENOUS at 08:26

## 2020-08-19 RX ADMIN — POTASSIUM CHLORIDE 10 MEQ: 7.46 INJECTION, SOLUTION INTRAVENOUS at 06:54

## 2020-08-19 RX ADMIN — ATORVASTATIN CALCIUM 40 MG: 40 TABLET, FILM COATED ORAL at 21:04

## 2020-08-19 RX ADMIN — LISINOPRIL AND HYDROCHLOROTHIAZIDE 1 TABLET: 12.5; 1 TABLET ORAL at 08:28

## 2020-08-19 RX ADMIN — INSULIN LISPRO 2 UNITS: 100 INJECTION, SOLUTION INTRAVENOUS; SUBCUTANEOUS at 08:44

## 2020-08-19 RX ADMIN — LORAZEPAM 0.5 MG: 0.5 TABLET ORAL at 21:04

## 2020-08-19 RX ADMIN — MAGNESIUM SULFATE HEPTAHYDRATE 1 G: 1 INJECTION, SOLUTION INTRAVENOUS at 10:19

## 2020-08-19 RX ADMIN — DILTIAZEM HYDROCHLORIDE 10 MG/HR: 5 INJECTION INTRAVENOUS at 03:40

## 2020-08-19 RX ADMIN — Medication 10 ML: at 21:05

## 2020-08-19 RX ADMIN — FUROSEMIDE 20 MG: 20 INJECTION, SOLUTION INTRAMUSCULAR; INTRAVENOUS at 19:14

## 2020-08-19 RX ADMIN — INSULIN LISPRO 2 UNITS: 100 INJECTION, SOLUTION INTRAVENOUS; SUBCUTANEOUS at 12:41

## 2020-08-19 RX ADMIN — LORAZEPAM 0.5 MG: 0.5 TABLET ORAL at 08:27

## 2020-08-19 RX ADMIN — METFORMIN HYDROCHLORIDE 850 MG: 850 TABLET ORAL at 16:42

## 2020-08-19 RX ADMIN — POTASSIUM CHLORIDE 10 MEQ: 7.46 INJECTION, SOLUTION INTRAVENOUS at 10:18

## 2020-08-19 RX ADMIN — APIXABAN 5 MG: 5 TABLET, FILM COATED ORAL at 08:27

## 2020-08-19 RX ADMIN — APIXABAN 5 MG: 5 TABLET, FILM COATED ORAL at 21:04

## 2020-08-19 RX ADMIN — METOPROLOL SUCCINATE 100 MG: 50 TABLET, EXTENDED RELEASE ORAL at 08:40

## 2020-08-19 RX ADMIN — DILTIAZEM HYDROCHLORIDE 360 MG: 180 CAPSULE, COATED, EXTENDED RELEASE ORAL at 08:27

## 2020-08-19 RX ADMIN — I-VITE, TAB 1000-60-2MG (60/BT) 1 TABLET: TAB at 08:27

## 2020-08-19 RX ADMIN — MAGNESIUM SULFATE HEPTAHYDRATE 1 G: 1 INJECTION, SOLUTION INTRAVENOUS at 12:42

## 2020-08-19 RX ADMIN — POTASSIUM CHLORIDE 10 MEQ: 7.46 INJECTION, SOLUTION INTRAVENOUS at 11:31

## 2020-08-19 RX ADMIN — MAGNESIUM SULFATE HEPTAHYDRATE 1 G: 1 INJECTION, SOLUTION INTRAVENOUS at 09:08

## 2020-08-19 RX ADMIN — INSULIN LISPRO 1 UNITS: 100 INJECTION, SOLUTION INTRAVENOUS; SUBCUTANEOUS at 21:02

## 2020-08-19 RX ADMIN — OYSTER SHELL CALCIUM WITH VITAMIN D 2 TABLET: 500; 200 TABLET, FILM COATED ORAL at 08:28

## 2020-08-19 RX ADMIN — FLECAINIDE ACETATE 50 MG: 100 TABLET ORAL at 21:04

## 2020-08-19 RX ADMIN — MAGNESIUM SULFATE HEPTAHYDRATE 1 G: 1 INJECTION, SOLUTION INTRAVENOUS at 11:33

## 2020-08-19 RX ADMIN — MULTIPLE VITAMINS W/ MINERALS TAB 1 TABLET: TAB at 08:40

## 2020-08-19 RX ADMIN — ISOSORBIDE MONONITRATE 30 MG: 30 TABLET, EXTENDED RELEASE ORAL at 08:28

## 2020-08-19 RX ADMIN — METFORMIN HYDROCHLORIDE 850 MG: 850 TABLET ORAL at 21:04

## 2020-08-19 RX ADMIN — INSULIN LISPRO 2 UNITS: 100 INJECTION, SOLUTION INTRAVENOUS; SUBCUTANEOUS at 18:41

## 2020-08-19 RX ADMIN — Medication 10 ML: at 08:29

## 2020-08-19 RX ADMIN — MAGNESIUM OXIDE TAB 400 MG (241.3 MG ELEMENTAL MG) 400 MG: 400 (241.3 MG) TAB at 08:39

## 2020-08-19 RX ADMIN — PANTOPRAZOLE SODIUM 40 MG: 40 TABLET, DELAYED RELEASE ORAL at 05:46

## 2020-08-19 RX ADMIN — FLECAINIDE ACETATE 50 MG: 100 TABLET ORAL at 10:17

## 2020-08-19 RX ADMIN — POTASSIUM CHLORIDE 10 MEQ: 7.46 INJECTION, SOLUTION INTRAVENOUS at 14:01

## 2020-08-19 ASSESSMENT — PAIN SCALES - GENERAL
PAINLEVEL_OUTOF10: 0

## 2020-08-19 NOTE — CONSULTS
History and Physical  [place] [address]   Cardiology    Chief Complaint: atrial flutter with rapid rate    HPI:     Patient is a 69 y.o. female presents after a day of rapid atrial flutter/atrial tach and near syncope. Fortunately she converted to sinus rhythm overnight. She has had 3 atrial ablations procedures, last 4/20,  and her flecainide stopped days ago. A [employer] monitor alerted us about rapid rates. She feels better this am.  She describes difficulty with hypertension. Cards consult. Past Medical History:   Diagnosis Date    Atrial fibrillation ([address]     CAD (coronary artery disease)     NSTEMI 1/201-->Echo LVEF normal, grade II diastolic dysfx, LAE. Cath 1/27/17 70-75% LAD, FFR LAD 0.80, 50% PLD- Med tx.      GERD (gastroesophageal reflux disease)     High triglycerides     HLD (hyperlipidemia)     Hypertension     Menopause     Osteoarthritis     Stress incontinence     Thyroid disease     Type II or unspecified type diabetes mellitus without mention of complication, not stated as uncontrolled       Past Surgical History:   Procedure Laterality Date    APPENDECTOMY  1953    ATRIAL ABLATION SURGERY N/A 10/30/2018    BRITTANY Atrial Flutter Ablation    CHOLECYSTECTOMY  9/2006    COLONOSCOPY      FACIAL COSMETIC SURGERY  1965    TONSILLECTOMY  1952    TUBAL LIGATION          Medications Prior to Admission: metoprolol succinate (TOPROL XL) 100 MG extended release tablet, TAKE 1 TABLET BY MOUTH EVERY DAY  isosorbide mononitrate (IMDUR) 30 MG extended release tablet, Take 1 tablet by mouth daily  ELIQUIS 5 MG TABS tablet, TAKE 1 TABLET BY MOUTH TWICE A DAY  lisinopril-hydrochlorothiazide (PRINZIDE;ZESTORETIC) 20-25 MG per tablet, TAKE 1 TABLET BY MOUTH TWICE A DAY  diltiazem (CARDIZEM CD) 360 MG extended release capsule, TAKE 1 CAPSULE BY MOUTH EVERY DAY  magnesium oxide (MAG-OX) 400 MG tablet, TAKE 1 TABLET BY MOUTH TWICE A DAY  atorvastatin (LIPITOR) 40 MG tablet, TAKE 1 TABLET BY MOUTH EVERY DAY  Multiple Vitamins-Minerals (EYE VITAMINS) CAPS, Take 1 tablet by mouth daily  Krill Oil 300 MG CAPS, Take 350 mg by mouth 2 times daily  metFORMIN (GLUCOPHAGE) 850 MG tablet, Take 1 tablet by mouth 3 times daily  omeprazole (PRILOSEC OTC) 20 MG tablet, Take 1 tablet by mouth daily  LORazepam (ATIVAN) 0.5 MG tablet, Take 1 tablet by mouth nightly (Patient taking differently: Take 0.5 mg by mouth 2 times daily. )  calcium-vitamin D (OSCAL-500) 500-200 MG-UNIT per tablet, Take 2 tablets by mouth daily    Allergies   Allergen Reactions    Influenza Vaccines Hives      Social History     Tobacco Use    Smoking status: Current Every Day Smoker     Packs/day: 1.00     Years: 45.00     Pack years: 45.00     Types: Cigarettes     Last attempt to quit: 2018     Years since quittin.9    Smokeless tobacco: Never Used   Substance Use Topics    Alcohol use: No     Alcohol/week: 0.0 standard drinks      Family History   Problem Relation Age of Onset    Diabetes Mother     High Blood Pressure Mother     Cancer Mother         breast cancer    Cancer Father         prostate    Coronary Art Dis Father     Other Maternal Grandmother         enlarged heart        Review of Systems:  · Constitutional: No Fever or Weight Loss  · Eyes: No decreased vision  · ENT: No Headaches, Hearing Loss or Vertigo  · Cardiovascular:  Palpitations and pre-syncope  · Respiratory: cough, smokes    · Gastrointestinal: No abdominal pain, appetite loss, blood in stools, constipation, diarrhea or heartburn  · Genitourinary: No dysuria, trouble voiding, or hematuria  · Musculoskeletal:  No gait disturbance, weakness or joint complaints  · Integumentary: No rash or pruritis  · Neurological: No TIA or stroke symptoms  · Psychiatric: No anxiety or depression  · Endocrine: No malaise, fatigue or temperature intolerance  · Hematologic/Lymphatic: No bleeding problems, blood clots or swollen lymph nodes  · Allergic/Immunologic: No nasal congestion or hives      Objective Data:     /83   Pulse 97   Temp 98.2 °F (36.8 °C) (Oral)   Resp 20   Ht 5' 6.5\" (1.689 m)   Wt 177 lb 7.5 oz (80.5 kg)   SpO2 94%   BMI 28.22 kg/m²     General appearance: alert, appears stated age and cooperative  Alert, awake, oriented x 3  Eyes:  No erythema  Head: atraumatic  Neck:  no JVD  Lungs: clear to auscultation bilaterally  Heart: regular rate and rhythm, S1, S2 normal, no murmur, click, rub or gallop  Abdomen: soft, non-tender; bowel sounds normal; no masses,  no organomegaly  Extremities: extremities normal, atraumatic, no cyanosis or edema  Skin: Skin color, texture, turgor normal. No rashes or lesions  Hematologic: no remarkable bruising       ECG: supraventricular tachycardia atrial tach rate 160 bpm and variable conduction likely left atrial tach    Cxr:  elevated right diaphragm    Data Review    Recent Labs     08/18/20  1713 08/19/20  0439   * 135*   K 3.7 2.9*   CL 93* 94*   CO2 26 25   BUN 13 12   CREATININE 0.5* 0.5*     Recent Labs     08/18/20  1714 08/19/20  0439   WBC 12.4* 10.5   HGB 14.5 13.4   HCT 42.4 40.0   MCV 80.8 81.3    192     Lab Results   Component Value Date    TROPONINI <0.01 08/18/2020         Assessment:     Active Problems:    Atrial tachycardia (HCC)  Resolved Problems:    * No resolved hospital problems. *      Plan:   1. recurrent atrial tach post rfa  2. Readd fecainide   3. ekg to see if still atrial tach or sinus tach  4. Echo, thyroid studies  5. Correct k, likely tachycardia diuresis  6.  Will follow

## 2020-08-19 NOTE — ED NOTES
[facility] Patient to be transported to Room 5121 via stretcher by transport tech. Patient transported with bedside cardiac monitor and with IV medications infusing. IV site clean, dry, and intact. MEWS score and pain assessed as 0 and documented. Updated patient on plan of care.        Guillermo Francis RN  08/18/20 3619

## 2020-08-19 NOTE — FLOWSHEET NOTE
08/19/20 1738   Encounter Summary   Services provided to: Patient   Referral/Consult From: Nurse   Support System Spouse; Children;Family members   Continue Visiting   (visit and AD doc discussion 8/19 CL)   Complexity of Encounter Moderate   Length of Encounter 45 minutes   Spiritual/Orthodoxy   Type Spiritual support   Assessment Calm; Approachable; Hopeful;Coping   Intervention Active listening;Explored feelings, thoughts, concerns; Discussed illness/injury and it's impact   Outcome Engaged in conversation;Coping; Hopeful   Advance Directives (For Healthcare)   Healthcare Directive No, patient does not have an advance directive for healthcare treatment   Advance Directives Documents given;Documents explained  (AD doc protocl complete)   Electronically signed by Karly Lee on 8/19/2020 at 5:40 PM

## 2020-08-19 NOTE — PROGRESS NOTES
Progress Note  Admit Date: 8/18/2020      PCP: Jennifer Green MD     CC: F/U for syncope  Days in hospital:  1    SUBJECTIVE / Interval History:   Pt feels ok   no more palpitation  Headache +        Allergies  Influenza vaccines    Medications    Scheduled Meds:   lisinopril-hydroCHLOROthiazide  1 tablet Oral BID    flecainide  50 mg Oral 2 times per day    sodium chloride flush  10 mL Intravenous 2 times per day    atorvastatin  40 mg Oral Nightly    calcium-vitamin D  2 tablet Oral Daily    dilTIAZem  360 mg Oral Daily    apixaban  5 mg Oral BID    isosorbide mononitrate  30 mg Oral Daily    LORazepam  0.5 mg Oral BID    magnesium oxide  400 mg Oral BID    metFORMIN  850 mg Oral TID    metoprolol succinate  100 mg Oral Daily    I-Rosalie  1 tablet Oral Daily    therapeutic multivitamin-minerals  1 tablet Oral Daily    pantoprazole  40 mg Oral QAM AC    insulin lispro  0-6 Units Subcutaneous TID WC    insulin lispro  0-3 Units Subcutaneous Nightly     Continuous Infusions:   dilTIAZem Stopped (08/19/20 0858)    dextrose         PRN Meds:  potassium chloride, magnesium sulfate, sodium chloride flush, acetaminophen **OR** acetaminophen, polyethylene glycol, promethazine **OR** ondansetron, glucose, dextrose, glucagon (rDNA), dextrose    Vitals    /83   Pulse 97   Temp 98.2 °F (36.8 °C) (Oral)   Resp 20   Ht 5' 6.5\" (1.689 m)   Wt 177 lb 7.5 oz (80.5 kg)   SpO2 94%   BMI 28.22 kg/m²     Exam:    Gen: No distress. Eyes: PERRL. No sclera icterus. No conjunctival injection. ENT: No discharge. Pharynx clear. External appearance of ears and nose normal.  Neck: Trachea midline. No obvious mass. Resp: No accessory muscle use. No crackles. No wheezes. No rhonchi. No dullness on percussion. CV: Regular rate. Regular rhythm. No murmur or rub. No edema. GI: Non-tender. Non-distended. No hernia. Skin: Warm, dry, normal texture and turgor. No nodule on exposed extremities. PORTABLE   Final Result   Mild pulmonary vascular congestion. CONSULTS:    IP CONSULT TO CARDIOLOGY  IP CONSULT TO SPIRITUAL SERVICES    ASSESSMENT AND PLAN:      Active Problems:    Atrial tachycardia ([address]  Resolved Problems:    * No resolved hospital problems. *    Patient is a 58-year-old female with a past medical history of A. fib with RVR who has had multiple ablations in the past and was on flecainide which has been stopped for more than a week, presented with A. fib with RVR and near syncope. A. fib with RVR  -Patient was started on a Cardizem drip after which she switch to sinus  -Flecainide re-added  -Echo ordered    Pulm edema due to afib withRVR  - on lasix  - ECHO pending    Hypokalemia  -Replace and monitor    Diabetes type 2  -Continue metformin and sliding scale insulin. Monitor blood sugar    HTN  - monitor BP  - ct meds    DVT Prophylaxis: eliquis  Diet: DIET CARB CONTROL;  Code Status: Full Code      Discharge plan - possibly tomorrow    The patient and / or the family were informed of the results of any tests, a time was given to answer questions, a plan was proposed and they agreed with plan. Discussed with consulting physicians, nursing and social work     The note was completed using EMR. Every effort was made to ensure accuracy; however, inadvertent computerized transcription errors may be present.        Areli Durham MD

## 2020-08-19 NOTE — PROGRESS NOTES
4 Eyes Skin Assessment     The patient is being assess for  Admission    I agree that 2 RN's have performed a thorough Head to Toe Skin Assessment on the patient. ALL assessment sites listed below have been assessed. Areas assessed by both nurses: Rama/Audra   [x]   Head, Face, and Ears   [x]   Shoulders, Back, and Chest  [x]   Arms, Elbows, and Hands   [x]   Coccyx, Sacrum, and IschIum  [x]   Legs, Feet, and Heels        Does the Patient have Skin Breakdown?   No         Antonio Prevention initiated:  Yes   Wound Care Orders initiated:  NA      [facility] nurse consulted for Pressure Injury (Stage 3,4, Unstageable, DTI, NWPT, and Complex wounds), New and Established Ostomies:  NA      Nurse 1 eSignature: Electronically signed by Mainor Barrera RN on 8/19/20 at 2:34 AM EDT    **SHARE this note so that the co-signing nurse is able to place an eSignature**    Nurse 2 eSignature: Electronically signed by Екатерина Marino RN on 8/19/20 at 2:35 AM EDT

## 2020-08-19 NOTE — PLAN OF CARE
Pt admitted with A.fib with RVR. Report called by [facility] RN prior to pt arrival. Pt arrived on diltiazem drip at 15 mg/hr. Pt oriented to new environment. Admission packet reviewed and admission history completed. Plan of care reviewed with pt and questions answered. Pt arrived in A.fib with RVR. She converted to NSR at 0216. Pt have bigeminal PVC's at the time she converted. Pt's BP stable. Diltiazem drip decreased to 10 mg/hr. Pt tolerated change. She is on telemetry monitoring. Per [facility], cardiology consult called. Pt has SSI and metformin ordered. Clarified with pharmacy that it is okay for pt to receive both medications and was advised that is okay to give both as long as pt tolerates. Pt denies pain. She states she has \"degenerative arthritis\" in her back. She says it hurts if she is on her feet but resolves with rest.     Pt expressed that she does not want her  making medical decision for her in the event that she cannot speak for herself. She states that she wants her granddaughter to make those decision. Pt encouraged to get HCPOA established.  consult placed per pt request.      Pt is a medium fall risk. Fall contract reviewed with the pt. Pt VU/DU. Nonskid socks, bracelet, and sign all in place. Bed in locked, low position with side rails up X 2. Will continue to monitor.

## 2020-08-19 NOTE — PROGRESS NOTES
Medication Reconciliation     List of medications patient is currently taking is complete. Source of information:   1. Conversation with patient at bedside  2. EPIC records        Notes regarding home medications:  1. Patient received all of her morning and afternoon home medications today.       Jared Blas, Pharmacy Intern  8/18/2020 8:30 PM

## 2020-08-20 VITALS
BODY MASS INDEX: 27.47 KG/M2 | WEIGHT: 175.04 LBS | DIASTOLIC BLOOD PRESSURE: 89 MMHG | OXYGEN SATURATION: 95 % | RESPIRATION RATE: 20 BRPM | HEIGHT: 67 IN | SYSTOLIC BLOOD PRESSURE: 143 MMHG | TEMPERATURE: 98.4 F | HEART RATE: 106 BPM

## 2020-08-20 LAB
ANION GAP SERPL CALCULATED.3IONS-SCNC: 16 MMOL/L (ref 3–16)
BUN BLDV-MCNC: 9 MG/DL (ref 7–20)
CALCIUM SERPL-MCNC: 8.1 MG/DL (ref 8.3–10.6)
CHLORIDE BLD-SCNC: 97 MMOL/L (ref 99–110)
CO2: 25 MMOL/L (ref 21–32)
CREAT SERPL-MCNC: <0.5 MG/DL (ref 0.6–1.2)
GFR AFRICAN AMERICAN: >60
GFR NON-AFRICAN AMERICAN: >60
GLUCOSE BLD-MCNC: 200 MG/DL (ref 70–99)
GLUCOSE BLD-MCNC: 206 MG/DL (ref 70–99)
GLUCOSE BLD-MCNC: 221 MG/DL (ref 70–99)
MAGNESIUM: 1.4 MG/DL (ref 1.8–2.4)
PERFORMED ON: ABNORMAL
PERFORMED ON: ABNORMAL
POTASSIUM SERPL-SCNC: 3.2 MMOL/L (ref 3.5–5.1)
SODIUM BLD-SCNC: 138 MMOL/L (ref 136–145)

## 2020-08-20 PROCEDURE — 80048 BASIC METABOLIC PNL TOTAL CA: CPT

## 2020-08-20 PROCEDURE — 6360000002 HC RX W HCPCS: Performed by: INTERNAL MEDICINE

## 2020-08-20 PROCEDURE — 36415 COLL VENOUS BLD VENIPUNCTURE: CPT

## 2020-08-20 PROCEDURE — 6370000000 HC RX 637 (ALT 250 FOR IP): Performed by: INTERNAL MEDICINE

## 2020-08-20 PROCEDURE — 94760 N-INVAS EAR/PLS OXIMETRY 1: CPT

## 2020-08-20 PROCEDURE — 99232 SBSQ HOSP IP/OBS MODERATE 35: CPT | Performed by: NURSE PRACTITIONER

## 2020-08-20 PROCEDURE — 2580000003 HC RX 258: Performed by: INTERNAL MEDICINE

## 2020-08-20 PROCEDURE — 83735 ASSAY OF MAGNESIUM: CPT

## 2020-08-20 RX ORDER — FLECAINIDE ACETATE 50 MG/1
50 TABLET ORAL EVERY 12 HOURS SCHEDULED
Qty: 60 TABLET | Refills: 3 | Status: SHIPPED | OUTPATIENT
Start: 2020-08-20 | End: 2020-08-21

## 2020-08-20 RX ORDER — POTASSIUM CHLORIDE 7.45 MG/ML
10 INJECTION INTRAVENOUS PRN
Status: DISCONTINUED | OUTPATIENT
Start: 2020-08-20 | End: 2020-08-20 | Stop reason: HOSPADM

## 2020-08-20 RX ORDER — MAGNESIUM OXIDE 400 MG/1
TABLET ORAL
Qty: 180 TABLET | Refills: 3 | Status: SHIPPED | OUTPATIENT
Start: 2020-08-20

## 2020-08-20 RX ORDER — POTASSIUM CHLORIDE 20 MEQ/1
40 TABLET, EXTENDED RELEASE ORAL PRN
Status: DISCONTINUED | OUTPATIENT
Start: 2020-08-20 | End: 2020-08-20 | Stop reason: HOSPADM

## 2020-08-20 RX ADMIN — MAGNESIUM SULFATE HEPTAHYDRATE 1 G: 1 INJECTION, SOLUTION INTRAVENOUS at 10:14

## 2020-08-20 RX ADMIN — Medication 10 ML: at 09:13

## 2020-08-20 RX ADMIN — MAGNESIUM OXIDE TAB 400 MG (241.3 MG ELEMENTAL MG) 400 MG: 400 (241.3 MG) TAB at 09:11

## 2020-08-20 RX ADMIN — ISOSORBIDE MONONITRATE 30 MG: 30 TABLET, EXTENDED RELEASE ORAL at 09:10

## 2020-08-20 RX ADMIN — METOPROLOL SUCCINATE 100 MG: 50 TABLET, EXTENDED RELEASE ORAL at 09:09

## 2020-08-20 RX ADMIN — MULTIPLE VITAMINS W/ MINERALS TAB 1 TABLET: TAB at 09:09

## 2020-08-20 RX ADMIN — LISINOPRIL AND HYDROCHLOROTHIAZIDE 1 TABLET: 12.5; 1 TABLET ORAL at 09:09

## 2020-08-20 RX ADMIN — METFORMIN HYDROCHLORIDE 850 MG: 850 TABLET ORAL at 09:09

## 2020-08-20 RX ADMIN — ACETAMINOPHEN 650 MG: 325 TABLET ORAL at 05:32

## 2020-08-20 RX ADMIN — PANTOPRAZOLE SODIUM 40 MG: 40 TABLET, DELAYED RELEASE ORAL at 07:03

## 2020-08-20 RX ADMIN — FLECAINIDE ACETATE 50 MG: 100 TABLET ORAL at 09:09

## 2020-08-20 RX ADMIN — INSULIN LISPRO 2 UNITS: 100 INJECTION, SOLUTION INTRAVENOUS; SUBCUTANEOUS at 09:14

## 2020-08-20 RX ADMIN — LORAZEPAM 0.5 MG: 0.5 TABLET ORAL at 09:11

## 2020-08-20 RX ADMIN — DILTIAZEM HYDROCHLORIDE 360 MG: 180 CAPSULE, COATED, EXTENDED RELEASE ORAL at 09:10

## 2020-08-20 RX ADMIN — I-VITE, TAB 1000-60-2MG (60/BT) 1 TABLET: TAB at 09:14

## 2020-08-20 RX ADMIN — APIXABAN 5 MG: 5 TABLET, FILM COATED ORAL at 09:10

## 2020-08-20 RX ADMIN — OYSTER SHELL CALCIUM WITH VITAMIN D 2 TABLET: 500; 200 TABLET, FILM COATED ORAL at 09:11

## 2020-08-20 RX ADMIN — MAGNESIUM SULFATE HEPTAHYDRATE 1 G: 1 INJECTION, SOLUTION INTRAVENOUS at 11:31

## 2020-08-20 RX ADMIN — POTASSIUM CHLORIDE 40 MEQ: 1500 TABLET, EXTENDED RELEASE ORAL at 10:34

## 2020-08-20 ASSESSMENT — PAIN SCALES - GENERAL
PAINLEVEL_OUTOF10: 8
PAINLEVEL_OUTOF10: 0

## 2020-08-20 NOTE — PROGRESS NOTES
Physician Progress Note      Prince Burleson  CSN #:                  [id number]  :                       1950  ADMIT DATE:       2020 4:49 PM  [address] [place] DATE:        2020 1:22 PM  RESPONDING  PROVIDER #:        Scottie Mcclendon MD          QUERY TEXT:    Pt admitted with A-fib with RVR. Pt noted to have Elevated Pro-BNP and CXR   shows Mild pulmonary vascular congestion . If possible, please document in the   progress notes and discharge summary if you are evaluating and/or treating   any of the following: The medical record reflects the following:  Risk Factors: Hx HTN, CAD, A-fib  Clinical Indicators: ED record notes chronic dyspnea on exertion and current   c/o fatigue. ED resp rate=30 breaths/min. CXR shows Mild pulmonary vascular   congestion and BNP=1,345. RN flowsheet assessment notes crackles in RLL and   diminished in LLL and notes Orthopnea  Treatment: Monitor and assess, tx A-fib with RVR with Cardizem gtt  Options provided:  -- Acute pulmonary edema due to heart disease  -- Acute pulmonary edema due to heart failure  -- Chronic pulmonary edema due to heart disease  -- Chronic pulmonary edema due to heart failure  -- Noncardiogenic acute pulmonary edema due to ***  -- Other - I will add my own diagnosis  -- Disagree - Not applicable / Not valid  -- Disagree - Clinically unable to determine / Unknown  -- Refer to Clinical Documentation Reviewer    PROVIDER RESPONSE TEXT:    This patient has acute pulmonary edema due to heart disease. Query created by:  Augusto Donis on 2020 9:29 AM      Electronically signed by:  Scottie Mcclendon MD 2020 2:17 PM

## 2020-08-20 NOTE — PLAN OF CARE
Problem: Safety:  Goal: Free from accidental physical injury  Description: Free from accidental physical injury  8/20/2020 1255 by Marah Campuzano RN  Outcome: Completed  8/19/2020 2305 by Bess Best RN  Outcome: Ongoing     Problem: Daily Care:  Goal: Daily care needs are met  Description: Daily care needs are met  8/20/2020 1255 by Marah Campuzano RN  Outcome: Completed  8/19/2020 2305 by Bess Best RN  Outcome: Ongoing     Problem: Pain:  Goal: Patient's pain/discomfort is manageable  Description: Patient's pain/discomfort is manageable  8/20/2020 1255 by Marah Campuzano RN  Outcome: Completed  8/19/2020 2305 by Bess Best RN  Outcome: Ongoing     Problem: Discharge Planning:  Goal: Patients continuum of care needs are met  Description: Patients continuum of care needs are met  8/20/2020 1255 by Marah Campuzano RN  Outcome: Completed  8/19/2020 2305 by Bess Best RN  Outcome: Ongoing     Problem: Cardiac:  Goal: Ability to maintain vital signs within normal range will improve  Description: Ability to maintain vital signs within normal range will improve  8/20/2020 1255 by Marah Campuzano RN  Outcome: Completed  8/19/2020 2305 by Bess Best RN  Outcome: Ongoing  Goal: Cardiovascular alteration will improve  Description: Cardiovascular alteration will improve  8/20/2020 1255 by Marah Campuzano RN  Outcome: Completed  8/19/2020 2305 by Bess Best RN  Outcome: Ongoing     Problem: Falls - Risk of:  Goal: Will remain free from falls  Description: Will remain free from falls  8/20/2020 1255 by Marah Campuzano RN  Outcome: Completed  8/19/2020 2305 by Bess Best RN  Outcome: Ongoing  Goal: Absence of physical injury  Description: Absence of physical injury  8/20/2020 1255 by Marah Campuzano RN  Outcome: Completed  8/19/2020 2305 by Bess Best RN  Outcome: Ongoing

## 2020-08-20 NOTE — DISCHARGE SUMMARY
Hospital Medicine Discharge Summary      Patient ID: Keenan Jaime      Patient's PCP: Alex Walsh MD    Admit Date: 8/18/2020     Discharge Date: 8/20/2020  The patient was seen and examined on day of discharge and this discharge summary is in conjunction with any daily progress note from day of discharge. Admitting Physician: Cesar Zaidi MD    Discharge Physician: Adam Foster MD     Admitted for   Chief Complaint   Patient presents with    Tachycardia       Admitting Diagnosis Atrial fibrillation with RVR [facility]) [I48.91]    Discharge Diagnoses: Active Hospital Problems    Diagnosis Date Noted    Atrial tachycardia [facility]) [I47.1] 08/19/2020       Follow Up: Primary Care Physician in one week    PCP to Follow up on   Needs follow-up with cardiology for both her A. fib as well as mitral regurgitation  Electrolytes need monitoring          Hospital Course:   Patient is a 78-year-old female with a past medical history of A. fib with RVR who has had multiple ablations in the past and was on flecainide which has been stopped for more than a week, presented with A. fib with RVR and near syncope. Patient was started on a Cardizem drip after which she switch to sinus. Her flecainide was restarted. Her echo showed normal systolic function. She did have some pulmonary edema due to A. fib with RVR and got a dose of Lasix. Patient will need close follow-up with her cardiologist as she was found to have mitral regurgitation on her echo.     A. fib with RVR  -Patient was started on a Cardizem drip after which she switch to sinus  -Flecainide re-added     MR  -possibly severe , seen on ECHO        Pulm edema due to afib withRVR  - on lasix  - ECHO noted, EF normal      Hypokalemia  -Replace and monitor     Diabetes type 2  -Continue metformin and sliding scale insulin.   Monitor blood sugar     HTN  - monitor BP  - ct meds         Consults:     IP CONSULT TO CARDIOLOGY  IP CONSULT TO SPIRITUAL SERVICES        Disposition: home    Discharged Condition: Stable    Code Status: Full Code    Activity: activity as tolerated    Diet: regular diet            Labs:  For convenience and continuity at follow-up the following most recent labs are provided:    CBC:   Lab Results   Component Value Date    WBC 10.5 08/19/2020    HGB 13.4 08/19/2020    HCT 40.0 08/19/2020     08/19/2020       RENAL:   Lab Results   Component Value Date     08/20/2020    K 3.2 08/20/2020    K 2.9 08/19/2020    CL 97 08/20/2020    CO2 25 08/20/2020    BUN 9 08/20/2020    CREATININE <0.5 08/20/2020           Discharge Medications:    Mercedes Tobin   Home Medication Instructions [facility]:[id number]    Printed on:08/20/20 5323   Medication Information                      atorvastatin (LIPITOR) 40 MG tablet  TAKE 1 TABLET BY MOUTH EVERY DAY             calcium-vitamin D (OSCAL-500) 500-200 MG-UNIT per tablet  Take 2 tablets by mouth daily             diltiazem (CARDIZEM CD) 360 MG extended release capsule  TAKE 1 CAPSULE BY MOUTH EVERY DAY             ELIQUIS 5 MG TABS tablet  TAKE 1 TABLET BY MOUTH TWICE A DAY             flecainide (TAMBOCOR) 50 MG tablet  Take 1 tablet by mouth every 12 hours             isosorbide mononitrate (IMDUR) 30 MG extended release tablet  Take 1 tablet by mouth daily             Krill Oil 300 MG CAPS  Take 350 mg by mouth 2 times daily             lisinopril-hydrochlorothiazide (PRINZIDE;ZESTORETIC) 20-25 MG per tablet  TAKE 1 TABLET BY MOUTH TWICE A DAY             LORazepam (ATIVAN) 0.5 MG tablet  Take 1 tablet by mouth nightly             magnesium oxide (MAG-OX) 400 MG tablet  TAKE 1 TABLET BY MOUTH TWICE A DAY             metFORMIN (GLUCOPHAGE) 850 MG tablet  Take 1 tablet by mouth 3 times daily             metoprolol succinate (TOPROL XL) 100 MG extended release tablet  TAKE 1 TABLET BY MOUTH EVERY DAY             Multiple Vitamins-Minerals (EYE VITAMINS) CAPS  Take 1 tablet by mouth daily             omeprazole (PRILOSEC OTC) 20 MG tablet  Take 1 tablet by mouth daily                 Future Appointments   Date Time Provider Parisa Sales   8/28/2020  3:15 PM Sara Hoskins MD [facility]   9/23/2020 10:30 AM Jennifer Fatima MD [facility]       Time Spent on discharge is more than 45 minutes in the examination, evaluation, counseling and review of medications and discharge plan. Signed:  Bertrand Bliss MD   8/20/2020    The note was completed using EMR. Every effort was made to ensure accuracy; however, inadvertent computerized transcription errors may be present. Thank you Henri Esparza MD for the opportunity to be involved in this patient's care. If you have any questions or concerns please feel free to contact me at [phone].

## 2020-08-20 NOTE — PROGRESS NOTES
Discharge instructions explained to pt and copy given. Pt verbalized understanding and is in agreement with dc plan to home. Daughter present to transport pt home via private car. Pt transported off unit propelled in w/c via staff assist without incident.

## 2020-08-20 NOTE — PROGRESS NOTES
Progress Note  Admit Date: 8/18/2020      PCP: Woo Lundberg MD     CC: F/U for syncope  Days in hospital:  2    SUBJECTIVE / Interval History:   Pt feels ok   no more palpitation          Allergies  Influenza vaccines    Medications    Scheduled Meds:   lisinopril-hydroCHLOROthiazide  1 tablet Oral BID    flecainide  50 mg Oral 2 times per day    sodium chloride flush  10 mL Intravenous 2 times per day    atorvastatin  40 mg Oral Nightly    calcium-vitamin D  2 tablet Oral Daily    dilTIAZem  360 mg Oral Daily    apixaban  5 mg Oral BID    isosorbide mononitrate  30 mg Oral Daily    LORazepam  0.5 mg Oral BID    magnesium oxide  400 mg Oral BID    metFORMIN  850 mg Oral TID    metoprolol succinate  100 mg Oral Daily    I-Rosalie  1 tablet Oral Daily    therapeutic multivitamin-minerals  1 tablet Oral Daily    pantoprazole  40 mg Oral QAM AC    insulin lispro  0-6 Units Subcutaneous TID WC    insulin lispro  0-3 Units Subcutaneous Nightly     Continuous Infusions:   dextrose         PRN Meds:  potassium chloride, magnesium sulfate, sodium chloride flush, acetaminophen **OR** acetaminophen, polyethylene glycol, promethazine **OR** ondansetron, glucose, dextrose, glucagon (rDNA), dextrose    Vitals    BP (!) 155/81   Pulse 57   Temp 97.7 °F (36.5 °C) (Oral)   Resp 18   Ht 5' 6.5\" (1.689 m)   Wt 175 lb 0.7 oz (79.4 kg)   SpO2 94%   BMI 27.83 kg/m²     Exam:    Gen: No distress. Eyes: PERRL. No sclera icterus. No conjunctival injection. ENT: No discharge. Pharynx clear. External appearance of ears and nose normal.  Neck: Trachea midline. No obvious mass. Resp: No accessory muscle use. No crackles. No wheezes. No rhonchi. No dullness on percussion. CV: Regular rate. Regular rhythm. No murmur or rub. No edema. GI: Non-tender. Non-distended. No hernia. Skin: Warm, dry, normal texture and turgor. No nodule on exposed extremities. Lymph: No cervical LAD. No supraclavicular LAD. M/S: No cyanosis. No clubbing. No joint deformity. Neuro: Moves all four extremities. CN 2-12 tested, no defect noted. Psych: Oriented x 3. No anxiety. Awake. Alert. Intact judgement and insight. Data    LABS  CBC:   Recent Labs     08/18/20  1714 08/19/20  0439   WBC 12.4* 10.5   HGB 14.5 13.4   HCT 42.4 40.0   MCV 80.8 81.3    192     BMP:   Recent Labs     08/18/20  1713 08/19/20  0439 08/19/20  1734   * 135*  --    K 3.7 2.9* 3.6   CL 93* 94*  --    CO2 26 25  --    BUN 13 12  --    CREATININE 0.5* 0.5* <0.5*   GLUCOSE 248* 189*  --      POC GLUCOSE:    Recent Labs     08/19/20  0733 08/19/20  1208 08/19/20  1709 08/19/20  2054 08/20/20  0730   POCGLU 204* 210* 206* 179* 200*     LIVER PROFILE:   Recent Labs     08/18/20  1713   AST 18   ALT 11   LABALBU 3.9   BILITOT 0.3   ALKPHOS 53     PT/INR: No results for input(s): PROTIME, INR in the last 72 hours. APTT: No results for input(s): APTT in the last 72 hours. UA:No results for input(s): NITRITE, COLORU, PHUR, LABCAST, WBCUA, RBCUA, MUCUS, TRICHOMONAS, YEAST, BACTERIA, CLARITYU, SPECGRAV, LEUKOCYTESUR, UROBILINOGEN, BILIRUBINUR, BLOODU, GLUCOSEU, KETUA, AMORPHOUS in the last 72 hours. Microbiology:  Wound Culture: No results for input(s): WNDABS, ORG in the last 72 hours. Invalid input(s):  LABGRAM  Nasal Culture: No results for input(s): ORG, MRSAPCR in the last 72 hours. Blood Culture: No results for input(s): BC, BLOODCULT2 in the last 72 hours. Fungal Culture:   No results for input(s): FUNGSM in the last 72 hours. No results for input(s): FUNCXBLD in the last 72 hours. CSF Culture:  No results for input(s): COLORCSF, APPEARCSF, CFTUBE, CLOTCSF, WBCCSF, RBCCSF, NEUTCSF, NUMCELLSCSF, LYMPHSCSF, MONOCSF, GLUCCSF, VOLCSF in the last 72 hours. Respiratory Culture:  No results for input(s): Fred Simmons in the last 72 hours. AFB:No results for input(s): AFBSMEAR in the last 72 hours.   Urine Culture  No results for input(s): LABURIN in the last 72 hours. RADIOLOGY:    XR CHEST PORTABLE   Final Result   Mild pulmonary vascular congestion. CONSULTS:    IP CONSULT TO CARDIOLOGY  IP CONSULT TO SPIRITUAL SERVICES    ASSESSMENT AND PLAN:      Active Problems:    Atrial tachycardia ([address]  Resolved Problems:    * No resolved hospital problems. *    Patient is a 58-year-old female with a past medical history of A. fib with RVR who has had multiple ablations in the past and was on flecainide which has been stopped for more than a week, presented with A. fib with RVR and near syncope. Patient was started on a Cardizem drip after which she switch to sinus. Her flecainide was restarted. Her echo showed normal systolic function. She did have some pulmonary edema due to A. fib with RVR and got a dose of Lasix. Patient will need close follow-up with her cardiologist as she was found to have mitral regurgitation on her echo. A. fib with RVR  -Patient was started on a Cardizem drip after which she switch to sinus  -Flecainide re-added    MR  -possibly severe , seen on ECHO      Pulm edema due to afib withRVR  - on lasix  - ECHO noted, EF normal     Hypokalemia  -Replace and monitor    Diabetes type 2  -Continue metformin and sliding scale insulin. Monitor blood sugar    HTN  - monitor BP  - ct meds    DVT Prophylaxis: eliquis  Diet: DIET CARB CONTROL;  Code Status: Full Code      Discharge plan - today     The patient and / or the family were informed of the results of any tests, a time was given to answer questions, a plan was proposed and they agreed with plan. Discussed with consulting physicians, nursing and social work     The note was completed using EMR. Every effort was made to ensure accuracy; however, inadvertent computerized transcription errors may be present.        Areli Durham MD

## 2020-08-20 NOTE — PLAN OF CARE
Problem: Safety:  Goal: Free from accidental physical injury  Description: Free from accidental physical injury  Outcome: Ongoing     Problem: Daily Care:  Goal: Daily care needs are met  Description: Daily care needs are met  Outcome: Ongoing     Problem: Pain:  Goal: Patient's pain/discomfort is manageable  Description: Patient's pain/discomfort is manageable  Outcome: Ongoing     Problem: Discharge Planning:  Goal: Patients continuum of care needs are met  Description: Patients continuum of care needs are met  Outcome: Ongoing     Problem: Cardiac:  Goal: Ability to maintain vital signs within normal range will improve  Description: Ability to maintain vital signs within normal range will improve  Outcome: Ongoing     Problem: Cardiac:  Goal: Cardiovascular alteration will improve  Description: Cardiovascular alteration will improve  Outcome: Ongoing     Problem: Falls - Risk of:  Goal: Will remain free from falls  Description: Will remain free from falls  Outcome: Ongoing     Problem: Falls - Risk of:  Goal: Absence of physical injury  Description: Absence of physical injury  Outcome: Ongoing

## 2020-08-20 NOTE — PROGRESS NOTES
[P.O.:462]  Out: 3500    Wt Readings from Last 3 Encounters:   20 175 lb 0.7 oz (79.4 kg)   20 172 lb (78 kg)   20 182 lb 6.4 oz (82.7 kg)     Temp  Av.1 °F (36.7 °C)  Min: 97.7 °F (36.5 °C)  Max: 98.5 °F (36.9 °C)  Pulse  Av.2  Min: 56  Max: 107  BP  Min: 137/71  Max: 165/94  SpO2  Av.6 %  Min: 93 %  Max: 94 %    · Telemetry: Sinus tachycardia with PVCs. · Constitutional: Alert, in no acute distress. Appears stated age. · Head: Normocephalic and atraumatic. · Eyes: Conjunctivae normal. EOM are normal.   · Neck: Neck supple. No lymphadenopathy. No rigidity. No JVD present. · Cardiovascular: Normal rate, regular rhythm. No murmurs, rubs or gallops. No S3 or S4.  · Pulmonary/Chest: Clear breath sounds bilaterally. No crackles, wheezes or rhonchi. No respiratory accessory muscle use. · Abdominal: Soft. Normal bowel sounds present. No distension, No tenderness. · Musculoskeletal: No tenderness. No edema    · Lymphadenopathy: Has no cervical adenopathy. · Neurological: Alert and oriented. No gross deficits. · Skin: Skin is warm and dry. No rash, lesions, ulcerations noted. · Psychiatric: No anxiety or agitation. Labs, diagnostic and imaging results reviewed. Reviewed. Recent Labs     209 20  1734   * 135*  --    K 3.7 2.9* 3.6   CL 93* 94*  --    CO2 26 25  --    BUN 13 12  --    CREATININE 0.5* 0.5* <0.5*     Recent Labs     20   WBC 12.4* 10.5   HGB 14.5 13.4   HCT 42.4 40.0   MCV 80.8 81.3    192     Lab Results   Component Value Date    TROPONINI <0.01 2020     CrCl cannot be calculated (This lab value cannot be used to calculate CrCl because it is not a number: <0.5).    No results found for: BNP  Lab Results   Component Value Date    PROTIME 50.5 2019    PROTIME 53.6 2019    PROTIME 42.3 01/10/2019    INR 4.43 2019    INR 4.70 2019    INR 3.00 2019 Lab Results   Component Value Date    CHOL 150 2017    HDL 30 2017    TRIG 347 2017       Scheduled Meds:   lisinopril-hydroCHLOROthiazide  1 tablet Oral BID    flecainide  50 mg Oral 2 times per day    sodium chloride flush  10 mL Intravenous 2 times per day    atorvastatin  40 mg Oral Nightly    calcium-vitamin D  2 tablet Oral Daily    dilTIAZem  360 mg Oral Daily    apixaban  5 mg Oral BID    isosorbide mononitrate  30 mg Oral Daily    LORazepam  0.5 mg Oral BID    magnesium oxide  400 mg Oral BID    metFORMIN  850 mg Oral TID    metoprolol succinate  100 mg Oral Daily    I-Rosalie  1 tablet Oral Daily    therapeutic multivitamin-minerals  1 tablet Oral Daily    pantoprazole  40 mg Oral QAM AC    insulin lispro  0-6 Units Subcutaneous TID WC    insulin lispro  0-3 Units Subcutaneous Nightly     Continuous Infusions:   dextrose       PRN Meds:potassium chloride, magnesium sulfate, sodium chloride flush, acetaminophen **OR** acetaminophen, polyethylene glycol, promethazine **OR** ondansetron, glucose, dextrose, glucagon (rDNA), dextrose     EC20  ST at 107 BPM. PVCs. Non-specific ST-T wave changes. Echo:20  Conclusions      Summary   There is concentric left ventricular hypertrophy with normal   Ejection fraction is visually estimated to be 50-55%. Indeterminate diastolic function. Normal right ventricular size and function. The left atrium is severely dilated. Moderately severe mitral regurgitation. Possibly severe, recommend   transesophageal echocardiography to further evaluate if clinically   warranted. Mild tricuspid regurgitation. Stress: 19  Conclusions          Summary     Normal myocardial perfusion study.     Normal LV size and systolic function.          Non-diagnostic EKG response due to failure to reach target heart rate.          Overall findings represent a low risk study. [facility]: 17  ANGIOGRAPHIC FINDINGS:  1.   The left [facility], [address] [place] [address], [address] [place] [address]  Phone: [phone]  Fax: [phone]    Electronically signed by OSWALD Gerardo CNP on 8/20/2020 at 9:35 AM

## 2020-08-21 ENCOUNTER — CARE COORDINATION (OUTPATIENT)
Dept: CASE MANAGEMENT | Age: 70
End: 2020-08-21

## 2020-08-21 PROCEDURE — 1111F DSCHRG MED/CURRENT MED MERGE: CPT

## 2020-08-21 RX ORDER — FLECAINIDE ACETATE 50 MG/1
TABLET ORAL
Qty: 180 TABLET | Refills: 1 | Status: SHIPPED | OUTPATIENT
Start: 2020-08-21

## 2020-08-27 ENCOUNTER — CARE COORDINATION (OUTPATIENT)
Dept: CASE MANAGEMENT | Age: 70
End: 2020-08-27

## 2020-08-27 NOTE — CARE COORDINATION
[facility] Transitions Follow Up Call    2020    Patient: Nica Flores  Patient : 1950   MRN: [id number]  Reason for Admission:   Discharge Date: 20 RARS: Readmission Risk Score: 15    Follow Up: Attempted to contact patient for BPCI-A follow up. Unable to reach patient. Left message with contact information and request for call back.       Future Appointments   Date Time Provider Parisa Sales   2020  3:15 PM Steven Zhang MD [address]   2020 10:30 AM Salvatore Steinberg MD [facility]       Makayla Bee RN

## 2020-08-28 ENCOUNTER — OFFICE VISIT (OUTPATIENT)
Dept: CARDIOLOGY CLINIC | Age: 70
End: 2020-08-28
Payer: MEDICARE

## 2020-08-28 VITALS
OXYGEN SATURATION: 96 % | HEART RATE: 89 BPM | SYSTOLIC BLOOD PRESSURE: 120 MMHG | TEMPERATURE: 97.1 F | BODY MASS INDEX: 28.22 KG/M2 | DIASTOLIC BLOOD PRESSURE: 84 MMHG | WEIGHT: 175.6 LBS | HEIGHT: 66 IN

## 2020-08-28 PROCEDURE — 1123F ACP DISCUSS/DSCN MKR DOCD: CPT | Performed by: INTERNAL MEDICINE

## 2020-08-28 PROCEDURE — 3017F COLORECTAL CA SCREEN DOC REV: CPT | Performed by: INTERNAL MEDICINE

## 2020-08-28 PROCEDURE — 99215 OFFICE O/P EST HI 40 MIN: CPT | Performed by: INTERNAL MEDICINE

## 2020-08-28 PROCEDURE — 4004F PT TOBACCO SCREEN RCVD TLK: CPT | Performed by: INTERNAL MEDICINE

## 2020-08-28 PROCEDURE — 4040F PNEUMOC VAC/ADMIN/RCVD: CPT | Performed by: INTERNAL MEDICINE

## 2020-08-28 PROCEDURE — 1111F DSCHRG MED/CURRENT MED MERGE: CPT | Performed by: INTERNAL MEDICINE

## 2020-08-28 PROCEDURE — 1090F PRES/ABSN URINE INCON ASSESS: CPT | Performed by: INTERNAL MEDICINE

## 2020-08-28 PROCEDURE — G8400 PT W/DXA NO RESULTS DOC: HCPCS | Performed by: INTERNAL MEDICINE

## 2020-08-28 PROCEDURE — G8417 CALC BMI ABV UP PARAM F/U: HCPCS | Performed by: INTERNAL MEDICINE

## 2020-08-28 PROCEDURE — G8427 DOCREV CUR MEDS BY ELIG CLIN: HCPCS | Performed by: INTERNAL MEDICINE

## 2020-08-28 RX ORDER — FLECAINIDE ACETATE 50 MG/1
50 TABLET ORAL 2 TIMES DAILY
COMMUNITY
End: 2020-08-28

## 2020-08-28 NOTE — LETTER
[address] [place] [address] Cardiology - [place] [address]  [address]  Phone: [phone]  Fax: [phone]    Elizabeth Rivera MD        September 3, 2020     [address] [address]    Patient: Clemencia Mccarthy  MR Number: <[id number]>  YOB: 1950  Date of Visit: 8/28/2020    Dear  [address] [place] [address]:    Thank you for your referral. Progress note attached in visit summary. If you have questions, please do not hesitate to call me. I look forward to following Maria E Caraballo along with you.     Sincerely,        Elizabeth Rivera MD

## 2020-08-28 NOTE — PROGRESS NOTES
[facility]      Cardiology Consult    Erika Iniguez  1950 August 28, 2020        CC: \"I am okay. \"    HPI:  The patient is 69 y.o. female with a past medical history significant for NSTEMI (2017), hyperlipidemia, hypothyroidism, hypertension, diabetes, and atrial fibrillation. Today, he is here to discuss echocardiogram. She says that he is tired. She continues to have shortness of breath but states that it has improved. She was recently hospitalized 8/18/2020-8/20/2020. She continues to smoke 1/2 pack of cigarettes a day. She says that every time she is taken off of the flecainide. Patient denies exertional chest pain/pressure, dyspnea at rest, PND, orthopnea, palpitations, lightheadedness, weight changes, changes in LE edema, and syncope. Patient reports compliance to her medications. Past Medical History:   Diagnosis Date    Atrial fibrillation ([address]     CAD (coronary artery disease)     NSTEMI 1/201-->Echo LVEF normal, grade II diastolic dysfx, LAE. Cath 1/27/17 70-75% LAD, FFR LAD 0.80, 50% PLD- Med tx.      GERD (gastroesophageal reflux disease)     High triglycerides     HLD (hyperlipidemia)     Hypertension     Menopause     Osteoarthritis     Stress incontinence     Thyroid disease     Type II or unspecified type diabetes mellitus without mention of complication, not stated as uncontrolled      Past Surgical History:   Procedure Laterality Date    APPENDECTOMY  1953    ATRIAL ABLATION SURGERY N/A 10/30/2018    BRITTANY Atrial Flutter Ablation    CHOLECYSTECTOMY  9/2006    COLONOSCOPY      FACIAL COSMETIC SURGERY  1965    TONSILLECTOMY  1952    TUBAL LIGATION       Family History   Problem Relation Age of Onset    Diabetes Mother     High Blood Pressure Mother     Cancer Mother         breast cancer    Cancer Father         prostate    Coronary Art Dis Father     Other Maternal Grandmother         enlarged heart     Social History     Tobacco Use    Smoking status: Current Every Day Smoker     Packs/day: 1.00     Years: 45.00     Pack years: 45.00     Types: Cigarettes     Last attempt to quit: 2018     Years since quittin.9    Smokeless tobacco: Never Used   Substance Use Topics    Alcohol use: No     Alcohol/week: 0.0 standard drinks    Drug use: No       Allergies   Allergen Reactions    Influenza Vaccines Hives     Current Outpatient Medications   Medication Sig Dispense Refill    flecainide (TAMBOCOR) 50 MG tablet TAKE 1 TABLET BY MOUTH TWICE A  tablet 1    magnesium oxide (MAG-OX) 400 MG tablet TAKE 1 TABLET BY MOUTH TWICE A  tablet 3    metoprolol succinate (TOPROL XL) 100 MG extended release tablet TAKE 1 TABLET BY MOUTH EVERY DAY 90 tablet 3    isosorbide mononitrate (IMDUR) 30 MG extended release tablet Take 1 tablet by mouth daily 90 tablet 3    ELIQUIS 5 MG TABS tablet TAKE 1 TABLET BY MOUTH TWICE A  tablet 3    lisinopril-hydrochlorothiazide (PRINZIDE;ZESTORETIC) 20-25 MG per tablet TAKE 1 TABLET BY MOUTH TWICE A  tablet 3    diltiazem (CARDIZEM CD) 360 MG extended release capsule TAKE 1 CAPSULE BY MOUTH EVERY DAY 90 capsule 3    atorvastatin (LIPITOR) 40 MG tablet TAKE 1 TABLET BY MOUTH EVERY DAY 90 tablet 3    Multiple Vitamins-Minerals (EYE VITAMINS) CAPS Take 1 tablet by mouth daily      Krill Oil 300 MG CAPS Take 350 mg by mouth 2 times daily      metFORMIN (GLUCOPHAGE) 850 MG tablet Take 1 tablet by mouth 3 times daily 90 tablet 2    LORazepam (ATIVAN) 0.5 MG tablet Take 1 tablet by mouth nightly (Patient taking differently: Take 0.5 mg by mouth 2 times daily. ) 30 tablet 2    calcium-vitamin D (OSCAL-500) 500-200 MG-UNIT per tablet Take 2 tablets by mouth daily       No current facility-administered medications for this visit. Review of Systems:  · Constitutional: no unanticipated weight loss. There's been no change in energy level, sleep pattern, or activity level. No fevers, chills. · Eyes: No visual changes or diplopia. No scleral icterus. · ENT: No Headaches, hearing loss or vertigo. No mouth sores or sore throat. · Cardiovascular: as reviewed in HPI  · Respiratory: No cough or wheezing, no sputum production. No hematemesis. · Gastrointestinal: No abdominal pain, appetite loss, blood in stools. No change in bowel or bladder habits. · Genitourinary: No dysuria, trouble voiding, or hematuria. · Musculoskeletal:  No gait disturbance, no joint complaints. · Integumentary: No rash or pruritis. · Neurological: No headache, diplopia, change in muscle strength, numbness or tingling. · Psychiatric: No anxiety or depression. · Endocrine: No temperature intolerance. No excessive thirst, fluid intake, or urination. No tremor. · Hematologic/Lymphatic: No abnormal bruising or bleeding, blood clots or swollen lymph nodes. · Allergic/Immunologic: No nasal congestion or hives. Physical Exam:   /84 (Site: Right Upper Arm, Position: Sitting, Cuff Size: Medium Adult)   Pulse 89   Temp 97.1 °F (36.2 °C)   Ht 5' 6\" (1.676 m)   Wt 175 lb 9.6 oz (79.7 kg)   SpO2 96%   BMI 28.34 kg/m²   Wt Readings from Last 3 Encounters:   08/28/20 175 lb 9.6 oz (79.7 kg)   08/20/20 175 lb 0.7 oz (79.4 kg)   08/12/20 172 lb (78 kg)     Constitutional: She is oriented to person, place, and time. She appears well-developed and well-nourished. In no acute distress. Head: Normocephalic and atraumatic. Pupils equal and round. Neck: Neck supple. No JVP or carotid bruit appreciated. No mass and no thyromegaly present. No lymphadenopathy present. Cardiovascular: Normal rate. Normal heart sounds. Exam reveals no gallop and no friction rub. 2/6 systolic murmur. Pulmonary/Chest: Effort normal and breath sounds normal. No respiratory distress. She has bilateral crackles. Abdominal: Soft, non-tender. Bowel sounds are normal. She exhibits no organomegaly, mass or bruit.    Extremities: No edema, cyanosis, or clubbing. Pulses are 2+ radial/dorsalis pedis/posterior tibial/carotid bilaterally. Neurological: No gross cranial nerve deficit. Coordination normal.   Skin: Skin is warm and dry. There is no rash or diaphoresis. Psychiatric: She has a normal mood and affect. Her speech is normal and behavior is normal.     Lab Review:   FLP:    Lab Results   Component Value Date    TRIG 347 01/28/2017    HDL 30 01/28/2017    LDLCALC see below 01/28/2017    LDLDIRECT 83 01/28/2017    LABVLDL see below 01/28/2017     BUN/Creatinine:    Lab Results   Component Value Date    BUN 9 08/20/2020    CREATININE <0.5 08/20/2020       EKG Interpretation:     Image Review:   ECHO 8/19/20  There is concentric left ventricular hypertrophy with normal  Ejection fraction is visually estimated to be 50-55%. Indeterminate diastolic function. Normal right ventricular size and function. The left atrium is severely dilated. Moderately severe mitral regurgitation. Possibly severe, recommend  transesophageal echocardiography to further evaluate if clinically  warranted. Mild tricuspid regurgitation. Assessment/Plan:   Coronary artery disease  Asymptomatic. Continue B-blocker and statin therapy. Atrial flutter  S/p Right atrial flutter ablation on 10/30/18. Continue Eliquis 5 mg BID. Atrial fibrillation   S/p afib ablation 3/19/19. S/p cardioversion on 6/3/2020 and 4/24/20. Continue Eliquis 5 mg BID. Mitral regurgitation  Severe. I will schedule her for a BRITTANY to evaluate this further. Hypertension  Continue current medical management. Nicotine addiction  Continues to smoke 1/2 PPD. Encouraged smoking cessation. Diabetes   Managed by PCP. Follow up after BRITTANY. This note was scribed in the presence of Dr Zelda Olivera, by Nilam Esparza RN    The scribes documentation has been prepared under my direction and personally reviewed by me in its entirety.     I confirm that the note above accurately reflects all work, treatment, procedures, and medical decision making performed by me.

## 2020-08-28 NOTE — PATIENT INSTRUCTIONS
Patient Education        Stopping Smoking: Care Instructions  Your Care Instructions     Cigarette smokers crave the nicotine in cigarettes. Giving it up is much harder than simply changing a habit. Your body has to stop craving the nicotine. It is hard to quit, but you can do it. There are many tools that people use to quit smoking. You may find that combining tools works best for you. There are several steps to quitting. First you get ready to quit. Then you get support to help you. After that, you learn new skills and behaviors to become a nonsmoker. For many people, a necessary step is getting and using medicine. Your doctor will help you set up the plan that best meets your needs. You may want to attend a smoking cessation program to help you quit smoking. When you choose a program, look for one that has proven success. Ask your doctor for ideas. You will greatly increase your chances of success if you take medicine as well as get counseling or join a cessation program.  Some of the changes you feel when you first quit tobacco are uncomfortable. Your body will miss the nicotine at first, and you may feel short-tempered and grumpy. You may have trouble sleeping or concentrating. Medicine can help you deal with these symptoms. You may struggle with changing your smoking habits and rituals. The last step is the tricky one: Be prepared for the smoking urge to continue for a time. This is a lot to deal with, but keep at it. You will feel better. Follow-up care is a key part of your treatment and safety. Be sure to make and go to all appointments, and call your doctor if you are having problems. It's also a good idea to know your test results and keep a list of the medicines you take. How can you care for yourself at home? · Ask your family, friends, and coworkers for support. You have a better chance of quitting if you have help and support.   · Join a support group, such as Nicotine Anonymous, for people who are trying to quit smoking. · Consider signing up for a smoking cessation program, such as the American Lung Association's Freedom from Smoking program.  · Get text messaging support. Go to the website at www.smokefree. gov to sign up for the [facility] program.  · Set a quit date. Pick your date carefully so that it is not right in the middle of a big deadline or stressful time. Once you quit, do not even take a puff. Get rid of all ashtrays and lighters after your last cigarette. Clean your house and your clothes so that they do not smell of smoke. · Learn how to be a nonsmoker. Think about ways you can avoid those things that make you reach for a cigarette. ? Avoid situations that put you at greatest risk for smoking. For some people, it is hard to have a drink with friends without smoking. For others, they might skip a coffee break with coworkers who smoke. ? Change your daily routine. Take a different route to work or eat a meal in a different place. · Cut down on stress. Calm yourself or release tension by doing an activity you enjoy, such as reading a book, taking a hot bath, or gardening. · Talk to your doctor or pharmacist about nicotine replacement therapy, which replaces the nicotine in your body. You still get nicotine but you do not use tobacco. Nicotine replacement products help you slowly reduce the amount of nicotine you need. These products come in several forms, many of them available over-the-counter:  ? Nicotine patches  ? Nicotine gum and lozenges  ? Nicotine inhaler  · Ask your doctor about bupropion (Wellbutrin) or varenicline (Chantix), which are prescription medicines. They do not contain nicotine. They help you by reducing withdrawal symptoms, such as stress and anxiety. · Some people find hypnosis, acupuncture, and massage helpful for ending the smoking habit. · Eat a healthy diet and get regular exercise.  Having healthy habits will help your body move past its craving for nicotine. · Be prepared to keep trying. Most people are not successful the first few times they try to quit. Do not get mad at yourself if you smoke again. Make a list of things you learned and think about when you want to try again, such as next week, next month, or next year. Where can you learn more? Go to https://Urakkamaailma.fipeemersonewingris.MemfoACT. org and sign in to your [employer] account. Enter O106 in the Womai box to learn more about \"Stopping Smoking: Care Instructions. \"     If you do not have an account, please click on the \"Sign Up Now\" link. Current as of: March 12, 2020               Content Version: 12.5  © 2006-2020 Healthwise, Incorporated. Care instructions adapted under license by [facility] ([facility]). If you have questions about a medical condition or this instruction, always ask your healthcare professional. [address] any warranty or liability for your use of this information.

## 2020-09-02 ENCOUNTER — CARE COORDINATION (OUTPATIENT)
Dept: CASE MANAGEMENT | Age: 70
End: 2020-09-02

## 2020-09-02 NOTE — CARE COORDINATION
[facility] Transitions Follow Up Call    2020    Patient: Ling Hernandez  Patient : 1950   MRN: [id number]  Reason for Admission:   Discharge Date: 20 RARS: Readmission Risk Score: 13         Spoke with: Otoniel Moreno, patient    Care Transitions Subsequent and Final Call    Subsequent and Final Calls  Have your medications changed?:  No  Do you have any questions related to your medications?:  No  Do you currently have any active services?:  No  Do you have any needs or concerns that I can assist you with?:  No  Identified Barriers:  None  Care Transitions Interventions  Other Interventions: Follow Up:  Contacted patient for BPCI-A follow up. Kavita Unger stated she is feeling much better. Denies having any chest pain/discomfort, palpitations, elevated or rapid HR, dizziness/lightheadedness. She reports becoming short of breath when bending over or going up and down stairs. She is not making any quick movements and tends to move slowly. Appetite has been fair and she is drinking adequate amount of water to stay hydrated. She is aware of when to contact MD with any new or worsening symptoms. She does not have any questions or concerns at this time. Will continue to follow.     Future Appointments   Date Time Provider Parisa Sales   2020 10:30 AM Chance Basilio MD [facility]       Jerardo Vasquez RN

## 2020-09-09 ENCOUNTER — TELEPHONE (OUTPATIENT)
Dept: CARDIOLOGY CLINIC | Age: 70
End: 2020-09-09

## 2020-09-09 NOTE — TELEPHONE ENCOUNTER
Patient has been scheduled for a BRITTANY on 9/15/2020 @ 10 am with Dr. Chris Howe. The patient will need to arrive at  [facility] in out patient surgery at 8 am.   I have addressed all medication needs as well as additional prep for the procedure. Paperwork sent to proper location. Noe in the cath lab, [facility] in surgery scheduling and Allison Juárez in the stress lab all notified. Procedure added to [facility]. Patient notified. COVID test scheduled 9/10/20 @ 11:20 am - patient notified.

## 2020-09-14 ENCOUNTER — TELEPHONE (OUTPATIENT)
Dept: CARDIOLOGY CLINIC | Age: 70
End: 2020-09-14

## 2020-09-14 ENCOUNTER — CARE COORDINATION (OUTPATIENT)
Dept: CASE MANAGEMENT | Age: 70
End: 2020-09-14

## 2020-09-14 PROCEDURE — 93228 REMOTE 30 DAY ECG REV/REPORT: CPT | Performed by: INTERNAL MEDICINE

## 2020-09-14 NOTE — CARE COORDINATION
[address] [facility] Care [facility] ([facility]) Follow Up Call  Qualifying Diagnosis of Cardiac Arrhythmia. 9/14/2020  Patient Name:  Gunjan Louis   YOB: 1950  Discharge Date:  8/20/20  RARS:  Readmission Risk Score: 15    PCP:  Verónica Alonzo MD     Message left in compliance with HIPPA. Stated who I was, representing the [facility] Care Transitions Team. Requested to place a call to their Physician with any immediate health needs/questions/concerns.      Future Appointments   Date Time Provider Parisa Sales   9/23/2020 10:30 AM Melanie King MD [facility]       Care Transitions will continue to follow per [facility] , RN, CTN

## 2020-09-18 ENCOUNTER — TELEPHONE (OUTPATIENT)
Dept: CARDIOLOGY CLINIC | Age: 70
End: 2020-09-18

## 2020-09-21 ENCOUNTER — CARE COORDINATION (OUTPATIENT)
Dept: CASE MANAGEMENT | Age: 70
End: 2020-09-21

## 2020-09-21 NOTE — CARE COORDINATION
[facility] Transitions Follow Up Call    2020    Patient: Keenan Jaime  Patient : 1950   MRN: [id number]  Reason for Admission:   Discharge Date: 20 RARS: Readmission Risk Score: 15         Spoke with: Attempted to contact patient for follow up BPCI-A transition call. Left message on Voice mail to call office (number given) with any questions and an update on your condition since discharge. Will Follow up at later time. Sara Mascorro LPN     [facility] / [address] [place] [address] Transitions Subsequent and Final Call    Subsequent and Final Calls  Care Transitions Interventions  Other Interventions:             Follow Up  Future Appointments   Date Time Provider Parisa Sales   2020 10:30 AM Zeke Kingsley MD [facility]       Sara Mascorro LPN

## 2020-09-23 ENCOUNTER — OFFICE VISIT (OUTPATIENT)
Dept: CARDIOLOGY CLINIC | Age: 70
End: 2020-09-23
Payer: MEDICARE

## 2020-09-23 VITALS
HEART RATE: 90 BPM | HEIGHT: 66 IN | BODY MASS INDEX: 28.45 KG/M2 | TEMPERATURE: 98.7 F | OXYGEN SATURATION: 95 % | DIASTOLIC BLOOD PRESSURE: 80 MMHG | SYSTOLIC BLOOD PRESSURE: 136 MMHG | WEIGHT: 177 LBS

## 2020-09-23 PROCEDURE — 99215 OFFICE O/P EST HI 40 MIN: CPT | Performed by: INTERNAL MEDICINE

## 2020-09-23 PROCEDURE — G8427 DOCREV CUR MEDS BY ELIG CLIN: HCPCS | Performed by: INTERNAL MEDICINE

## 2020-09-23 PROCEDURE — G8417 CALC BMI ABV UP PARAM F/U: HCPCS | Performed by: INTERNAL MEDICINE

## 2020-09-23 PROCEDURE — 93000 ELECTROCARDIOGRAM COMPLETE: CPT | Performed by: INTERNAL MEDICINE

## 2020-09-23 PROCEDURE — 4040F PNEUMOC VAC/ADMIN/RCVD: CPT | Performed by: INTERNAL MEDICINE

## 2020-09-23 PROCEDURE — 4004F PT TOBACCO SCREEN RCVD TLK: CPT | Performed by: INTERNAL MEDICINE

## 2020-09-23 PROCEDURE — 3017F COLORECTAL CA SCREEN DOC REV: CPT | Performed by: INTERNAL MEDICINE

## 2020-09-23 PROCEDURE — G8400 PT W/DXA NO RESULTS DOC: HCPCS | Performed by: INTERNAL MEDICINE

## 2020-09-23 PROCEDURE — 1123F ACP DISCUSS/DSCN MKR DOCD: CPT | Performed by: INTERNAL MEDICINE

## 2020-09-23 PROCEDURE — 1090F PRES/ABSN URINE INCON ASSESS: CPT | Performed by: INTERNAL MEDICINE

## 2020-09-23 NOTE — PROGRESS NOTES
[address]   Electrophysiology Consultation   Date: 9/23/2020  Reason for Consultation: Afib  Consult Requesting Physician: Jobie Boast, MD  Primary Care Physician: Leopold Lick, MD    Chief Complaint:   Chief Complaint   Patient presents with    Follow-up     afib - SOB                                     HPI: Lori Biswas is a 69 y.o. with a PMH significant for NSTEMI (2017), HLD, hypothyroidism, HTN, DM and PAF first diagnosed in 2017 in the setting of acute coronary syndrome who arrived to [facility] 9/4/18 with complaints of palpitations and fatigue. She was found to be back in AFib RVR and her beta blocker therapy was increased for tighter rate control. She was scheduled for a DCCV but spontaneously converted on her own prior to procedure. During office follow up visit on 10/1/19 EKG confirmed she was back in afib/aflutter. She subsequently underwent successful cardioversion on 10/4/19 . She underwent right atrial flutter ablation on 10/30/18 and and atrial fibrillation ablation on 3/19/19. She is typically followed by Dr. Janet Florian for her cardiac needs. She presented to [facility] ED on 1/7/2020 at the instruction of her primary cardiologist  Jobie Boast, MD after calling into his office reporting high heart rates and palpitations. She underwent successful cardioversion to NSR on 6/2/2020    Interval History: Today, she presents to office accompanied by her daughter for follow up on the results of her 30 day event monitor s/p ablation. The monitor revealed at three to four episodes of sustained atrial flutter. She is compliant with her medications and tolerating them well. She denies chest pain/pressure, tightness, edema, shortness of breath, heart racing, palpitations, lightheadedness, dizziness, syncope, presyncope,  PND or orthopnea.        Past Medical History:   Diagnosis Date    Atrial fibrillation ([address]     CAD (coronary artery disease)     NSTEMI 1/201-->Echo LVEF normal, grade II diastolic dysfx, LAE. Cath 1/27/17 70-75% LAD, FFR LAD 0.80, 50% PLD- Med tx.  GERD (gastroesophageal reflux disease)     High triglycerides     HLD (hyperlipidemia)     Hypertension     Menopause     Osteoarthritis     Stress incontinence     Thyroid disease     Type II or unspecified type diabetes mellitus without mention of complication, not stated as uncontrolled         Past Surgical History:   Procedure Laterality Date    APPENDECTOMY  1953    ATRIAL ABLATION SURGERY N/A 10/30/2018    BRITTANY Atrial Flutter Ablation    CHOLECYSTECTOMY  9/2006    COLONOSCOPY      FACIAL COSMETIC SURGERY  1965    TONSILLECTOMY  1952    TUBAL LIGATION         Allergies: Allergies   Allergen Reactions    Influenza Vaccines Hives       Medication:   Prior to Admission medications    Medication Sig Start Date End Date Taking?  Authorizing Provider   flecainide (TAMBOCOR) 50 MG tablet TAKE 1 TABLET BY MOUTH TWICE A DAY 8/21/20  Yes Imani Gupta MD   magnesium oxide (MAG-OX) 400 MG tablet TAKE 1 TABLET BY MOUTH TWICE A DAY 8/20/20  Yes Eligio Larsen MD   metoprolol succinate (TOPROL XL) 100 MG extended release tablet TAKE 1 TABLET BY MOUTH EVERY DAY 7/6/20  Yes Eligio Larsen MD   isosorbide mononitrate (IMDUR) 30 MG extended release tablet Take 1 tablet by mouth daily 2/27/20  Yes Lexis Sherman MD   ELIQUIS 5 MG TABS tablet TAKE 1 TABLET BY MOUTH TWICE A DAY 1/30/20  Yes Eligio Larsen MD   lisinopril-hydrochlorothiazide (PRINZIDE;ZESTORETIC) 20-25 MG per tablet TAKE 1 TABLET BY MOUTH TWICE A DAY 1/2/20  Yes Eligio Larsen MD   diltiazem (CARDIZEM CD) 360 MG extended release capsule TAKE 1 CAPSULE BY MOUTH EVERY DAY 12/30/19  Yes Eligio Larsen MD   atorvastatin (LIPITOR) 40 MG tablet TAKE 1 TABLET BY MOUTH EVERY DAY 11/25/19  Yes Eligio Larsen MD   Multiple Vitamins-Minerals (EYE VITAMINS) CAPS Take 1 tablet by mouth daily   Yes Historical Provider, MD   Krill Oil 300 MG CAPS Take 350 mg by mouth 2 times daily   Yes Historical Provider, MD   metFORMIN (GLUCOPHAGE) 850 MG tablet Take 1 tablet by mouth 3 times daily 1/3/17  Yes OSWALD Shin CNP   LORazepam (ATIVAN) 0.5 MG tablet Take 1 tablet by mouth nightly  Patient taking differently: Take 0.5 mg by mouth 2 times daily. 1/3/17  Yes OSWALD Shin CNP   calcium-vitamin D (OSCAL-500) 500-200 MG-UNIT per tablet Take 2 tablets by mouth daily   Yes Historical Provider, MD       Social History:   reports that she has been smoking cigarettes. She has a 45.00 pack-year smoking history. She has never used smokeless tobacco. She reports that she does not drink alcohol or use drugs. Family History:  family history includes Cancer in her father and mother; Coronary Art Dis in her father; Diabetes in her mother; High Blood Pressure in her mother; Other in her maternal grandmother. Reviewed. Denies family history of sudden cardiac death, arrhythmia, premature CAD    Review of System:    · General ROS: negative for - chills, fever +fatigue  · Psychological ROS: negative for - anxiety or depression  · Ophthalmic ROS: negative for - eye pain or loss of vision  · ENT ROS: negative for - epistaxis, headaches, nasal discharge, sore throat   · Allergy and Immunology ROS: negative for - hives, nasal congestion   · Hematological and Lymphatic ROS: negative for - bleeding problems, blood clots, bruising or jaundice  · Endocrine ROS: negative for - skin changes, temperature intolerance or unexpected weight changes  · Respiratory ROS: negative for - cough, hemoptysis, pleuritic pain, SOB, sputum changes or wheezing  · Cardiovascular ROS: Per HPI.    · Gastrointestinal ROS: negative for - abdominal pain, blood in stools, diarrhea, hematemesis, melena, nausea/vomiting or  swallowing difficulty/pain  · Genito-Urinary ROS: negative for - dysuria or incontinence  · Musculoskeletal ROS: negative for - joint swelling or muscle pain  · Neurological ROS: negative for - confusion, dizziness, gait disturbance, headaches, numbness/tingling, seizures, speech  problems, tremors, visual changes or weakness  · Dermatological ROS: negative for - rash    Physical Examination:  Vitals:    20 1027   BP: 136/80   Pulse: 90   Temp: 98.7 °F (37.1 °C)   SpO2: 95%       · Constitutional: Oriented. No distress. · Head: Normocephalic and atraumatic. · Mouth/Throat: Oropharynx is clear and moist.   · Eyes: Conjunctivae normal. EOM are normal.   · Neck: Normal range of motion. Neck supple. No rigidity. No JVD present. · Cardiovascular: regular rate , regular rhythm, S1&S2 and intact distal pulses. · Pulmonary/Chest: Bilateral respiratory sounds. No wheezes. No rhonchi. · Abdominal: Soft. Bowel sounds present. No distension, No tenderness. · Musculoskeletal: No tenderness. No edema    · Lymphadenopathy: Has no cervical adenopathy. · Neurological: Alert and oriented. Cranial nerve appears intact, No Gross deficit   · Skin: Skin is warm and dry. No rash noted. · Psychiatric: Has a normal mood, affect and behavior     Labs: reviewed. EC2020 reviewed, Sinus rhythm with v-rate of 87 bpm with QRS duration 84 ms. No pathologic Q waves, ventricular pre-excitation, or QT prolongation. 10/1/18: Afib  v-rate 107  10/15/18: Aflutter v-rate 120  19 Sinus rhythm  2020 [employer]     Other Non-Invasive Studies:     1. Event monitor:  2019  SR 1 Episode 6-7 beat NSVT. 30 day monitor 2020-2020   3-4 episodes of sustained atrial flutter    2. Echo: [facility])  EF 55-60%  Grade II DD  Left atrial cavity is severely dilated    3. Stress Test:    None    4. Cath: 17  ANGIOGRAPHIC FINDINGS:  1. The left main coronary artery comes from the left coronary cusp with  normal AYDIN 3 flow. 2.  The left anterior descending artery comes from the left main coronary  artery.   The mid portion has approximately 70% stenosis with a fractional  High triglycerides     Hypertension     Menopause     Osteoarthritis         Plan:    Right atrial flutter  -s/p Right atrial flutter ablation 10/30/18  -EKG today SR     Atrial fibrillation / Left atrial flutter  -EKG today SR   -s/p Electrophysiology study with radiofrequency ablation of atrial fibrillation and pulmonary vein isolation 3/19/2019   -s/p Electrophysiology study with radiofrequency ablation of left atrial flutter and pulmonary vein isolation 4/24/2020.  -[id number] Score 6 (CHF, HTN, DM, AGE, female gender)  -On Eliquis 5 mg BID for  thromboembolic risk reduction.  -Tolerating well no signs or symptoms of abnormal bruising or bleeding.  -On Toprol  mg daily & Cardizem  mg daily for rate control of afib, anti-remodeling and blood pressure control. On Flecainide 50 mg BID. -S/p left atrial flutter ablation on 4/24/2020.  -S/p cardioversion on 6/2/2020.    30 day monitor was worn from 8/13/2020-9/11/2020 revealed at 3-4 episodes of sustained atrial flutter. We educated the patient that left atrial flutter is a progressive disease, with more frequent episodes that will ensue. Subsequent episodes usually become more sustained to the extent that many individuals would then develop persistent left atrial flutter. Once persistence is reached, permanent left atrial flutter is inevitable. We discussed different management options for left atrial flutter including their risks and benefits. These options include use of cardioversion which provides an effective immediate therapy with success rates of 90% or higher, but it provides no short nor long term efficacy. Anti-arrhythmic medications has been very difficult to control left atrial flutter, both in regards to heart rate and rhythm control even with a powerful anti-arrhythmic medication (amiodarone). Left atrial flutter ablation is a potentially curative therapy with very reasonable success rate.       The risks, benefits and alternatives of the left atrial flutter ablation procedure were discussed with the patient. The risks including, but not limited to, the risks of bleeding, infection, radiation exposure, injury to vascular, cardiac and surrounding structures (including pneumothorax), stroke, cardiac perforation, tamponade, need for emergent open heart surgery, need for pacemaker implantation, injury to the phrenic nerve, injury to the esophagus, myocardial infarction and death were discussed in detail. The patient declined to proceed with the left atrial flutter ablation at this time, citing the fact that she has spent much time in the hospital for the 2019 and 2020 years. She will perhaps re-consider at a later time. Much time was spent counseling the patient on healthier lifestyle, following a low sodium diet <2000 mg of sodium a day and dramatically decreasing the intake of processed sugar. I spent 40 minutes face to face with the patient, with greater than 50% of that time spent in counseling on the above. Follow up with EP services PRN. Continue routine follow up with Dr. Dory Zhao. Thank you for allowing me to participate in the care of Babita Edwards. All questions and concerns were addressed to the patient/family. Alternatives to my treatment were discussed. This note was scribed in the presence of Darin Kumar MD by Enoch Forrester RN.     The scribe's documentation has been prepared under my direction and personally reviewed by me in its entirety. I confirm that the note above accurately reflects all work, physical examination, the discussion of treatments and procedures, and medical decision making performed by me.       Darin Kumar MD, MS, [facility]  Cardiac Electrophysiology  The [address] [place] [address]  [phone]

## 2020-09-28 NOTE — PROGRESS NOTES
[facility]      Cardiology Consult    Perez Stoddard  1950    HPI:  The patient is 69 y.o. female with a past medical history significant for NSTEMI (), hyperlipidemia, hypothyroidism, hypertension, diabetes, and atrial fibrillation. Today, she is here for f/u of her BRITTANY. She denies exertional chest pain, recent palpitations, dizziness, syncope, worsening leg swelling and worsening chronic dyspnea. She continues to smoke. Past Medical History:   Diagnosis Date    Atrial fibrillation ([address]     CAD (coronary artery disease)     NSTEMI -->Echo LVEF normal, grade II diastolic dysfx, LAE. Cath 17 70-75% LAD, FFR LAD 0.80, 50% PLD- Med tx.      GERD (gastroesophageal reflux disease)     High triglycerides     HLD (hyperlipidemia)     Hypertension     Menopause     Osteoarthritis     Stress incontinence     Thyroid disease     Type II or unspecified type diabetes mellitus without mention of complication, not stated as uncontrolled      Past Surgical History:   Procedure Laterality Date    APPENDECTOMY      ATRIAL ABLATION SURGERY N/A 10/30/2018    BRITTANY Atrial Flutter Ablation    CHOLECYSTECTOMY  2006    COLONOSCOPY      FACIAL COSMETIC SURGERY  1965    TONSILLECTOMY      TUBAL LIGATION       Family History   Problem Relation Age of Onset    Diabetes Mother     High Blood Pressure Mother     Cancer Mother         breast cancer    Cancer Father         prostate    Coronary Art Dis Father     Other Maternal Grandmother         enlarged heart     Social History     Tobacco Use    Smoking status: Current Every Day Smoker     Packs/day: 1.00     Years: 45.00     Pack years: 45.00     Types: Cigarettes     Last attempt to quit: 2018     Years since quittin.0    Smokeless tobacco: Never Used   Substance Use Topics    Alcohol use: No     Alcohol/week: 0.0 standard drinks    Drug use: No       Allergies   Allergen Reactions    Influenza Vaccines Hives     Current Outpatient Medications   Medication Sig Dispense Refill    flecainide (TAMBOCOR) 50 MG tablet TAKE 1 TABLET BY MOUTH TWICE A  tablet 1    magnesium oxide (MAG-OX) 400 MG tablet TAKE 1 TABLET BY MOUTH TWICE A  tablet 3    metoprolol succinate (TOPROL XL) 100 MG extended release tablet TAKE 1 TABLET BY MOUTH EVERY DAY 90 tablet 3    isosorbide mononitrate (IMDUR) 30 MG extended release tablet Take 1 tablet by mouth daily 90 tablet 3    ELIQUIS 5 MG TABS tablet TAKE 1 TABLET BY MOUTH TWICE A  tablet 3    lisinopril-hydrochlorothiazide (PRINZIDE;ZESTORETIC) 20-25 MG per tablet TAKE 1 TABLET BY MOUTH TWICE A  tablet 3    diltiazem (CARDIZEM CD) 360 MG extended release capsule TAKE 1 CAPSULE BY MOUTH EVERY DAY 90 capsule 3    atorvastatin (LIPITOR) 40 MG tablet TAKE 1 TABLET BY MOUTH EVERY DAY 90 tablet 3    Multiple Vitamins-Minerals (EYE VITAMINS) CAPS Take 1 tablet by mouth daily      Krill Oil 300 MG CAPS Take 350 mg by mouth 2 times daily      metFORMIN (GLUCOPHAGE) 850 MG tablet Take 1 tablet by mouth 3 times daily 90 tablet 2    LORazepam (ATIVAN) 0.5 MG tablet Take 1 tablet by mouth nightly (Patient taking differently: Take 0.5 mg by mouth 2 times daily. ) 30 tablet 2    calcium-vitamin D (OSCAL-500) 500-200 MG-UNIT per tablet Take 2 tablets by mouth daily       No current facility-administered medications for this visit. Review of Systems:  · Constitutional: no unanticipated weight loss. There's been no change in energy level, sleep pattern, or activity level. No fevers, chills. · Eyes: No visual changes or diplopia. No scleral icterus. · ENT: No Headaches, hearing loss or vertigo. No mouth sores or sore throat. · Cardiovascular: as reviewed in HPI  · Respiratory: No cough or wheezing, no sputum production. No hematemesis. · Gastrointestinal: No abdominal pain, appetite loss, blood in stools.  No change in bowel or bladder habits. · Genitourinary: No dysuria, trouble voiding, or hematuria. · Musculoskeletal:  No gait disturbance, no joint complaints. · Integumentary: No rash or pruritis. · Neurological: No headache, diplopia, change in muscle strength, numbness or tingling. · Psychiatric: No anxiety or depression. · Endocrine: No temperature intolerance. No excessive thirst, fluid intake, or urination. No tremor. · Hematologic/Lymphatic: No abnormal bruising or bleeding, blood clots or swollen lymph nodes. · Allergic/Immunologic: No nasal congestion or hives. Physical Exam:   /80   Pulse 76   Wt 178 lb (80.7 kg)   SpO2 97%   BMI 28.73 kg/m²   Wt Readings from Last 3 Encounters:   10/07/20 178 lb (80.7 kg)   09/23/20 177 lb (80.3 kg)   08/28/20 175 lb 9.6 oz (79.7 kg)        Constitutional: The patient is oriented to person, place, and time. Appears well-developed and well-nourished. In no acute distress. Head: Normocephalic and atraumatic. Pupils equal and round. Neck: Neck supple. No JVP or carotid bruit appreciated. No mass and no thyromegaly present. No lymphadenopathy present. Cardiovascular: Irregular. Normal rate. Normal heart sounds. Exam reveals no gallop and no friction rub. 2/6 systolic murmur heard. Pulmonary/Chest: Effort normal and breath sounds normal. No respiratory distress. No wheezes, rhonchi or rales. Abdominal: Soft, non-tender. Bowel sounds are normal. Exhibits no organomegaly, mass or bruit. Extremities: No edema. No cyanosis or clubbing. Pulses are 2+ radial and carotid bilaterally. Neurological: No gross cranial nerve deficit. Coordination normal.   Skin: Skin is warm and dry. There is no rash or diaphoresis. Psychiatric: Patient has a normal mood and affect.  Speech is normal and behavior is normal.     Lab Review:   FLP:    Lab Results   Component Value Date    TRIG 347 01/28/2017    HDL 30 01/28/2017    LDLCALC see below 01/28/2017    LDLDIRECT 83 01/28/2017    LABVLDL see below 01/28/2017     BUN/Creatinine:    Lab Results   Component Value Date    BUN 9 08/20/2020    CREATININE <0.5 08/20/2020         Image Review:     ANGIOGRAPHIC FINDINGS: 2/2017  1. Gabriela Kussmaul left main coronary artery comes from the left coronary cusp with  normal AYDIN 3 flow. 2.  The left anterior descending artery comes from the left main coronary  artery.  The mid portion has approximately 70% stenosis with a fractional flow reserve of 0.80. 3.  The left circumflex comes from the left main coronary.  It is small in  caliber and has mild diffuse disease and AYDIN 3 flow. 4.  The right coronary artery comes from the right coronary cusp.  It is a  dominant vessel, giving rise to the posterior descending artery and  posterolateral branch.  Mid portion has 50% stenosis. 5.  LV ejection fraction of 60%, LVEDP of 14 mmHg.     In summary, moderate left anterior descending artery stenosis of 70% to 75%  severity with a fractional flow reserve of 0.80. ECHO 8/19/20  There is concentric left ventricular hypertrophy with normal  Ejection fraction is visually estimated to be 50-55%. Indeterminate diastolic function. Normal right ventricular size and function. The left atrium is severely dilated. Moderately severe mitral regurgitation. Possibly severe, recommend  transesophageal echocardiography to further evaluate if clinically  warranted. Mild tricuspid regurgitation. BRITTANY 9/2020   LVEF normal, mild-mod MR. Assessment/Plan:     Coronary artery disease  Asymptomatic. Continue B-blocker, nitrates and statin therapy. Atrial flutter  S/p Right atrial flutter ablation on 10/30/18. Continue Eliquis 5 mg BID. Followed by Dr. Jame Gomez. Taking Flecainide. 30 day monitor  8/13/2020-9/11/2020 revealed at 3-4 episodes of sustained atrial flutter. Atrial fibrillation   S/p afib ablation 3/19/19. S/p cardioversion on 10/2019, 4/24/20 and 6/3/2020. Event monitor 7/2019 NSR, NSVT. Continue Eliquis 5 mg BID.      Mitral regurgitation  Mild-mod MR by BRITTANY. Hypertension  Controlled. Nicotine addiction  Encouraged smoking cessation. Diabetes   Managed by PCP. HLD. LDL <40 1/2020. Plan. Test results discussed. BRITTANY showed mild-mod MR. No clinical evidence of CHF. No angina. Will continue to monitor MR via periodic echocardiograms. Atrial fib controlled. Follow up in 6 months    This note was scribed in the presence of the physician by Loyd Harris RN. The scribes documentation has been prepared under my direction and personally reviewed by me in its entirety. I confirm that the note above accurately reflects all work, treatment, procedures, and medical decision making performed by me.

## 2020-10-05 ENCOUNTER — CARE COORDINATION (OUTPATIENT)
Dept: CASE MANAGEMENT | Age: 70
End: 2020-10-05

## 2020-10-05 NOTE — CARE COORDINATION
[facility] Transitions Follow Up Call    10/5/2020    Patient: Young Cortes  Patient : 1950   MRN: [id number]  Reason for Admission:   Discharge Date: 20 RARS: Readmission Risk Score: 15      Follow Up: Attempted to contact patient for BPCI-A follow up. Unable to reach patient. Left message with contact information and request for call back.       Future Appointments   Date Time Provider Parisa Sales   10/7/2020  2:00 PM MD Laya Ha, MELVIN

## 2020-10-07 ENCOUNTER — OFFICE VISIT (OUTPATIENT)
Dept: CARDIOLOGY CLINIC | Age: 70
End: 2020-10-07
Payer: MEDICARE

## 2020-10-07 VITALS
BODY MASS INDEX: 28.73 KG/M2 | WEIGHT: 178 LBS | DIASTOLIC BLOOD PRESSURE: 80 MMHG | OXYGEN SATURATION: 97 % | HEART RATE: 76 BPM | SYSTOLIC BLOOD PRESSURE: 135 MMHG

## 2020-10-07 PROCEDURE — 1123F ACP DISCUSS/DSCN MKR DOCD: CPT | Performed by: INTERNAL MEDICINE

## 2020-10-07 PROCEDURE — 1090F PRES/ABSN URINE INCON ASSESS: CPT | Performed by: INTERNAL MEDICINE

## 2020-10-07 PROCEDURE — G8427 DOCREV CUR MEDS BY ELIG CLIN: HCPCS | Performed by: INTERNAL MEDICINE

## 2020-10-07 PROCEDURE — G8484 FLU IMMUNIZE NO ADMIN: HCPCS | Performed by: INTERNAL MEDICINE

## 2020-10-07 PROCEDURE — 4040F PNEUMOC VAC/ADMIN/RCVD: CPT | Performed by: INTERNAL MEDICINE

## 2020-10-07 PROCEDURE — 99214 OFFICE O/P EST MOD 30 MIN: CPT | Performed by: INTERNAL MEDICINE

## 2020-10-07 PROCEDURE — 4004F PT TOBACCO SCREEN RCVD TLK: CPT | Performed by: INTERNAL MEDICINE

## 2020-10-07 PROCEDURE — 3017F COLORECTAL CA SCREEN DOC REV: CPT | Performed by: INTERNAL MEDICINE

## 2020-10-07 PROCEDURE — G8400 PT W/DXA NO RESULTS DOC: HCPCS | Performed by: INTERNAL MEDICINE

## 2020-10-07 PROCEDURE — G8417 CALC BMI ABV UP PARAM F/U: HCPCS | Performed by: INTERNAL MEDICINE

## 2020-10-07 NOTE — LETTER
[address] [place] [address] Cardiology - [place] [address]  [address]  Phone: [phone]  Fax: [phone]    Vasquez Plata MD        October 14, 2020     [address] [place] [address], [address] [place] [address]    Patient: Kina Benoit  MR Number: <[id number]>  YOB: 1950  Date of Visit: 10/7/2020    Dear  [address] [place] [address]:    Thank you for your referral. Progress note attached in visit summary. If you have questions, please do not hesitate to call me. I look forward to following Ren Patterson along with you.     Sincerely,        Vasquez Plata MD

## 2020-10-19 ENCOUNTER — CARE COORDINATION (OUTPATIENT)
Dept: CARE COORDINATION | Age: 70
End: 2020-10-19

## 2020-10-31 ENCOUNTER — CARE COORDINATION (OUTPATIENT)
Dept: CASE MANAGEMENT | Age: 70
End: 2020-10-31

## 2020-11-02 ENCOUNTER — CARE COORDINATION (OUTPATIENT)
Dept: CASE MANAGEMENT | Age: 70
End: 2020-11-02

## 2020-11-02 NOTE — CARE COORDINATION
[facility]  Transitions Follow Up Call    2020    Patient: Kina Benoit  Patient : 1950   MRN: [id number]  Reason for Admission:   Discharge Date: 20 RARS: Readmission Risk Score: 15    Follow Up: Attempted to contact patient for BPCI-A follow up. Unable to reach patient. Left message with contact information and request for call back.       Future Appointments   Date Time Provider Parisa Sales   2021  1:45 PM Vasquez Plata MD [place] SIL Rodgers RN

## 2020-11-03 PROBLEM — I48.91 ATRIAL FIBRILLATION (HCC): Status: RESOLVED | Noted: 2019-01-10 | Resolved: 2020-11-03

## 2020-11-03 PROBLEM — I10 HYPERTENSION: Status: RESOLVED | Noted: 2020-11-03 | Resolved: 2020-11-03

## 2020-11-16 ENCOUNTER — CARE COORDINATION (OUTPATIENT)
Dept: CASE MANAGEMENT | Age: 70
End: 2020-11-16

## 2020-11-16 NOTE — CARE COORDINATION
[facility] Transitions Follow Up Call    2020    Patient: Claudio Alcazar  Patient : 1950   MRN: [id number]  Reason for Admission: Afib with RVR  Discharge Date: 20 RARS: Readmission Risk Score: 15         Attempted to contact patient for final BPCI-A call. Contact information left to  requesting call back at the earliest convenience.  CTN sign off for BPCI-A program.    Follow Up  Future Appointments   Date Time Provider Parisa Sales   2021  1:45 PM Heavenly Ham MD [facility]       Huber Yang RN

## 2020-12-16 RX ORDER — ATORVASTATIN CALCIUM 40 MG/1
TABLET, FILM COATED ORAL
Qty: 90 TABLET | Refills: 3 | Status: SHIPPED | OUTPATIENT
Start: 2020-12-16

## 2020-12-16 RX ORDER — DILTIAZEM HYDROCHLORIDE 360 MG/1
CAPSULE, EXTENDED RELEASE ORAL
Qty: 90 CAPSULE | Refills: 3 | Status: SHIPPED | OUTPATIENT
Start: 2020-12-16

## 2021-03-09 RX ORDER — ISOSORBIDE MONONITRATE 30 MG/1
TABLET, EXTENDED RELEASE ORAL
Qty: 90 TABLET | Refills: 3 | OUTPATIENT
Start: 2021-03-09

## 2021-03-10 RX ORDER — ISOSORBIDE MONONITRATE 30 MG/1
TABLET, EXTENDED RELEASE ORAL
Qty: 90 TABLET | Refills: 3 | OUTPATIENT
Start: 2021-03-10

## 2024-06-14 NOTE — TELEPHONE ENCOUNTER
Pt received call from [employer] yesterday stating her heartbeat was well over 200 and she would like to know if she needs an appt.     Please give her a call back at [phone] [Negative] : Genitourinary